# Patient Record
Sex: FEMALE | Race: WHITE | NOT HISPANIC OR LATINO | Employment: OTHER | URBAN - METROPOLITAN AREA
[De-identification: names, ages, dates, MRNs, and addresses within clinical notes are randomized per-mention and may not be internally consistent; named-entity substitution may affect disease eponyms.]

---

## 2017-05-03 ENCOUNTER — GENERIC CONVERSION - ENCOUNTER (OUTPATIENT)
Dept: OTHER | Facility: OTHER | Age: 62
End: 2017-05-03

## 2017-05-03 DIAGNOSIS — E03.9 HYPOTHYROIDISM: ICD-10-CM

## 2017-05-11 ENCOUNTER — TRANSCRIBE ORDERS (OUTPATIENT)
Dept: ADMINISTRATIVE | Facility: HOSPITAL | Age: 62
End: 2017-05-11

## 2017-05-11 ENCOUNTER — HOSPITAL ENCOUNTER (OUTPATIENT)
Dept: MAMMOGRAPHY | Facility: HOSPITAL | Age: 62
Discharge: HOME | End: 2017-05-11
Payer: COMMERCIAL

## 2017-05-11 DIAGNOSIS — Z13.9 SCREENING: ICD-10-CM

## 2017-05-11 DIAGNOSIS — Z12.31 VISIT FOR SCREENING MAMMOGRAM: Primary | ICD-10-CM

## 2017-05-11 PROCEDURE — G0202 SCR MAMMO BI INCL CAD: HCPCS

## 2017-05-17 ENCOUNTER — GENERIC CONVERSION - ENCOUNTER (OUTPATIENT)
Dept: OTHER | Facility: OTHER | Age: 62
End: 2017-05-17

## 2017-05-17 ENCOUNTER — LAB CONVERSION - ENCOUNTER (OUTPATIENT)
Dept: OTHER | Facility: OTHER | Age: 62
End: 2017-05-17

## 2017-05-17 LAB
A/G RATIO (HISTORICAL): 1.3 (CALC) (ref 1–2.5)
ALBUMIN SERPL BCP-MCNC: 4 G/DL (ref 3.6–5.1)
ALP SERPL-CCNC: 64 U/L (ref 33–130)
ALT SERPL W P-5'-P-CCNC: 59 U/L (ref 6–29)
AST SERPL W P-5'-P-CCNC: 36 U/L (ref 10–35)
BASOPHILS # BLD AUTO: 0.8 %
BASOPHILS # BLD AUTO: 34 CELLS/UL (ref 0–200)
BILIRUB SERPL-MCNC: 0.5 MG/DL (ref 0.2–1.2)
BUN SERPL-MCNC: 13 MG/DL (ref 7–25)
BUN/CREA RATIO (HISTORICAL): ABNORMAL (CALC) (ref 6–22)
CALCIUM SERPL-MCNC: 8.8 MG/DL (ref 8.6–10.4)
CHLORIDE SERPL-SCNC: 106 MMOL/L (ref 98–110)
CHOLEST SERPL-MCNC: 139 MG/DL (ref 125–200)
CHOLEST/HDLC SERPL: 4.8 (CALC)
CO2 SERPL-SCNC: 28 MMOL/L (ref 20–31)
CREAT SERPL-MCNC: 0.7 MG/DL (ref 0.5–0.99)
DEPRECATED RDW RBC AUTO: 13.5 % (ref 11–15)
EGFR AFRICAN AMERICAN (HISTORICAL): 107 ML/MIN/1.73M2
EGFR-AMERICAN CALC (HISTORICAL): 92 ML/MIN/1.73M2
EOSINOPHIL # BLD AUTO: 159 CELLS/UL (ref 15–500)
EOSINOPHIL # BLD AUTO: 3.7 %
GAMMA GLOBULIN (HISTORICAL): 3.2 G/DL (CALC) (ref 1.9–3.7)
GLUCOSE (HISTORICAL): 97 MG/DL (ref 65–99)
HCT VFR BLD AUTO: 40.3 % (ref 35–45)
HDLC SERPL-MCNC: 29 MG/DL
HGB BLD-MCNC: 13.2 G/DL (ref 11.7–15.5)
LDL CHOLESTEROL (HISTORICAL): 80 MG/DL (CALC)
LYMPHOCYTES # BLD AUTO: 1307 CELLS/UL (ref 850–3900)
LYMPHOCYTES # BLD AUTO: 30.4 %
MCH RBC QN AUTO: 29.6 PG (ref 27–33)
MCHC RBC AUTO-ENTMCNC: 32.7 G/DL (ref 32–36)
MCV RBC AUTO: 90.4 FL (ref 80–100)
MONOCYTES # BLD AUTO: 271 CELLS/UL (ref 200–950)
MONOCYTES (HISTORICAL): 6.3 %
NEUTROPHILS # BLD AUTO: 2528 CELLS/UL (ref 1500–7800)
NEUTROPHILS # BLD AUTO: 58.8 %
NON-HDL-CHOL (CHOL-HDL) (HISTORICAL): 110 MG/DL (CALC)
PLATELET # BLD AUTO: 256 THOUSAND/UL (ref 140–400)
PMV BLD AUTO: 8.1 FL (ref 7.5–12.5)
POTASSIUM SERPL-SCNC: 4 MMOL/L (ref 3.5–5.3)
RBC # BLD AUTO: 4.46 MILLION/UL (ref 3.8–5.1)
SODIUM SERPL-SCNC: 140 MMOL/L (ref 135–146)
TOTAL PROTEIN (HISTORICAL): 7.2 G/DL (ref 6.1–8.1)
TRIGL SERPL-MCNC: 148 MG/DL
TSH SERPL DL<=0.05 MIU/L-ACNC: 2.66 MIU/L (ref 0.4–4.5)
WBC # BLD AUTO: 4.3 THOUSAND/UL (ref 3.8–10.8)

## 2017-05-24 ENCOUNTER — ALLSCRIPTS OFFICE VISIT (OUTPATIENT)
Dept: OTHER | Facility: OTHER | Age: 62
End: 2017-05-24

## 2017-07-20 ENCOUNTER — GENERIC CONVERSION - ENCOUNTER (OUTPATIENT)
Dept: OTHER | Facility: OTHER | Age: 62
End: 2017-07-20

## 2017-09-13 ENCOUNTER — TRANSCRIBE ORDERS (OUTPATIENT)
Dept: SLEEP CENTER | Facility: CLINIC | Age: 62
End: 2017-09-13

## 2017-09-13 DIAGNOSIS — Z99.89 OSA ON CPAP: Primary | ICD-10-CM

## 2017-09-13 DIAGNOSIS — G47.33 OSA ON CPAP: Primary | ICD-10-CM

## 2017-09-20 ENCOUNTER — HOSPITAL ENCOUNTER (OUTPATIENT)
Dept: SLEEP CENTER | Facility: CLINIC | Age: 62
Discharge: HOME | End: 2017-09-20
Payer: COMMERCIAL

## 2017-09-20 ENCOUNTER — TRANSCRIBE ORDERS (OUTPATIENT)
Dept: SLEEP CENTER | Facility: CLINIC | Age: 62
End: 2017-09-20

## 2017-09-20 DIAGNOSIS — G47.33 OSA ON CPAP: ICD-10-CM

## 2017-09-20 DIAGNOSIS — Z99.89 OSA ON CPAP: ICD-10-CM

## 2017-09-20 DIAGNOSIS — G47.33 OSA (OBSTRUCTIVE SLEEP APNEA): Primary | ICD-10-CM

## 2017-10-19 ENCOUNTER — GENERIC CONVERSION - ENCOUNTER (OUTPATIENT)
Dept: OTHER | Facility: OTHER | Age: 62
End: 2017-10-19

## 2017-10-31 DIAGNOSIS — Z00.00 ENCOUNTER FOR GENERAL ADULT MEDICAL EXAMINATION WITHOUT ABNORMAL FINDINGS: ICD-10-CM

## 2017-10-31 DIAGNOSIS — E03.9 HYPOTHYROIDISM: ICD-10-CM

## 2017-10-31 DIAGNOSIS — M54.9 DORSALGIA: ICD-10-CM

## 2017-10-31 DIAGNOSIS — R00.2 PALPITATIONS: ICD-10-CM

## 2017-11-03 ENCOUNTER — GENERIC CONVERSION - ENCOUNTER (OUTPATIENT)
Dept: OTHER | Facility: OTHER | Age: 62
End: 2017-11-03

## 2017-11-03 ENCOUNTER — LAB CONVERSION - ENCOUNTER (OUTPATIENT)
Dept: OTHER | Facility: OTHER | Age: 62
End: 2017-11-03

## 2017-11-03 LAB
A/G RATIO (HISTORICAL): 1.4 (CALC) (ref 1–2.5)
ALBUMIN SERPL BCP-MCNC: 4.4 G/DL (ref 3.6–5.1)
ALP SERPL-CCNC: 69 U/L (ref 33–130)
ALT SERPL W P-5'-P-CCNC: 45 U/L (ref 6–29)
AST SERPL W P-5'-P-CCNC: 37 U/L (ref 10–35)
BASOPHILS # BLD AUTO: 0.8 %
BASOPHILS # BLD AUTO: 40 CELLS/UL (ref 0–200)
BILIRUB SERPL-MCNC: 0.6 MG/DL (ref 0.2–1.2)
BUN SERPL-MCNC: 14 MG/DL (ref 7–25)
BUN/CREA RATIO (HISTORICAL): ABNORMAL (CALC) (ref 6–22)
CALCIUM SERPL-MCNC: 9.5 MG/DL (ref 8.6–10.4)
CHLORIDE SERPL-SCNC: 104 MMOL/L (ref 98–110)
CHOLEST SERPL-MCNC: 189 MG/DL
CHOLEST/HDLC SERPL: 3.9 (CALC)
CO2 SERPL-SCNC: 27 MMOL/L (ref 20–31)
CREAT SERPL-MCNC: 0.86 MG/DL (ref 0.5–0.99)
DEPRECATED RDW RBC AUTO: 13.1 % (ref 11–15)
EGFR AFRICAN AMERICAN (HISTORICAL): 83 ML/MIN/1.73M2
EGFR-AMERICAN CALC (HISTORICAL): 72 ML/MIN/1.73M2
EOSINOPHIL # BLD AUTO: 160 CELLS/UL (ref 15–500)
EOSINOPHIL # BLD AUTO: 3.2 %
GAMMA GLOBULIN (HISTORICAL): 3.2 G/DL (CALC) (ref 1.9–3.7)
GLUCOSE (HISTORICAL): 97 MG/DL (ref 65–99)
HCT VFR BLD AUTO: 42.7 % (ref 35–45)
HDLC SERPL-MCNC: 49 MG/DL
HGB BLD-MCNC: 14.1 G/DL (ref 11.7–15.5)
LDL CHOLESTEROL (HISTORICAL): 117 MG/DL (CALC)
LYMPHOCYTES # BLD AUTO: 1330 CELLS/UL (ref 850–3900)
LYMPHOCYTES # BLD AUTO: 26.6 %
MCH RBC QN AUTO: 30.1 PG (ref 27–33)
MCHC RBC AUTO-ENTMCNC: 33 G/DL (ref 32–36)
MCV RBC AUTO: 91.2 FL (ref 80–100)
MONOCYTES # BLD AUTO: 355 CELLS/UL (ref 200–950)
MONOCYTES (HISTORICAL): 7.1 %
NEUTROPHILS # BLD AUTO: 3115 CELLS/UL (ref 1500–7800)
NEUTROPHILS # BLD AUTO: 62.3 %
NON-HDL-CHOL (CHOL-HDL) (HISTORICAL): 140 MG/DL (CALC)
PLATELET # BLD AUTO: 283 THOUSAND/UL (ref 140–400)
PMV BLD AUTO: 10 FL (ref 7.5–12.5)
POTASSIUM SERPL-SCNC: 4.4 MMOL/L (ref 3.5–5.3)
RBC # BLD AUTO: 4.68 MILLION/UL (ref 3.8–5.1)
SODIUM SERPL-SCNC: 140 MMOL/L (ref 135–146)
TOTAL PROTEIN (HISTORICAL): 7.6 G/DL (ref 6.1–8.1)
TRIGL SERPL-MCNC: 124 MG/DL
TSH SERPL DL<=0.05 MIU/L-ACNC: 2.03 MIU/L (ref 0.4–4.5)
WBC # BLD AUTO: 5 THOUSAND/UL (ref 3.8–10.8)

## 2017-11-15 ENCOUNTER — ALLSCRIPTS OFFICE VISIT (OUTPATIENT)
Dept: OTHER | Facility: OTHER | Age: 62
End: 2017-11-15

## 2017-11-16 NOTE — PROGRESS NOTES
Assessment    1  Back pain (724 5) (M54 9)   2  Apnea, sleep (780 57) (G47 30)   3  Hypothyroidism (244 9) (E03 9)   4  Hyperlipidemia (272 4) (E78 5)    Plan  Back pain    · * US ABDOMEN COMPLETE; Status:Hold For - Scheduling; Requested for:80Dio8967;    · * XR CHEST PA & LATERAL; Status:Active; Requested for:63Sfh9384;   Esophagitis, reflux    · Pantoprazole Sodium 40 MG Oral Tablet Delayed Release; Take 1 tablet daily  Hypothyroidism    · Levothyroxine Sodium 25 MCG Oral Tablet; TAKE 1 TABLET MONDAY THROUGHFRIDAY AND TAKE 2 TABLETS ON SATURDAY AND SUNDAY    Discussion/Summary    Hyperlipidemia  Patient will try to adjust her diet to try and improve her LDL value  TSH is stable on current dose of levothyroxine  pain  Patient has concerns regarding family history of aortic and abdominal aneurysms  She will check abdominal ultrasound and chest x-ray for further evaluation  We will make further recommendations pending results of test  Since symptoms only occur in the early mornings while sleeping I suspect some if not all of her symptoms may be related to stressful condition at work  total time of encounter was 25 minutes-- and-- 20 minutes was spent counseling  Chief Complaint  Pt is here for a 6 month f/u appt  Pt c/o upper back pain x 6 months  Pt denies injury  Pt describes a pressure feeling when she sleeps  History of Present Illness  I reviewed chief complaint with the patient  She reports having several months history of awakening in the early morning hours with palpitations and shortness of breath  Her allergist and sleep doctor do not believe sleep apnea or CPAP machine is the cause of symptoms  Patient has been under a lot of stress in the past 6 months 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  There is a question of whether patient would retire or work for the new dental company   Patient does admit to lack of exercise and increase in chocolate consumption  I reviewed her most recent      Review of Systems   Constitutional: No fever, no chills, feels well, no tiredness, no recent weight gain or weight loss  ENT: no complaints of earache, no loss of hearing, no nose bleeds, no nasal discharge, no sore throat, no hoarseness  Cardiovascular: as noted in HPI  Respiratory: as noted in HPI  Gastrointestinal: No complaints of abdominal pain, no constipation, no nausea or vomiting, no diarrhea, no bloody stools  Genitourinary: No complaints of dysuria, no incontinence, no pelvic pain, no dysmenorrhea, no vaginal discharge or bleeding  Active Problems    1  Apnea, sleep (780 57) (G47 30)   2  Calcific tendinitis of right shoulder (726 11) (M75 31)   3  Esophagitis, reflux (530 11) (K21 0)   4  Hyperlipidemia (272 4) (E78 5)   5  Hypothyroidism (244 9) (E03 9)   6  Obesity (278)   7  Preoperative evaluation of a medical condition to rule out surgical contraindications (TAR required) (V72 83) (Z01 818)   8  Sleep apnea (780 57) (G47 30)   9  Stress Incontinence (788 39)   10  Urine findings abnormal (791 9) (R82 90)   11  UTI symptoms (788 99) (R39 9)   12  Vitamin D deficiency (268 9) (E55 9)    Past Medical History  1  History of Asthma (493 90) (J45 909)   2  History of glaucoma (V12 49) (Z80 78)    Family History  Mother    1  Family history of Reported Family History Of Heart Disease  Maternal Grandmother    2  Family history of Thyroid Disorder (V18 19)    Social History     · Never A Smoker  The social history was reviewed and updated today  Current Meds   1  Calcium 600 MG Oral Tablet; TAKE 1 TABLET DAILY; Therapy: 74DMO5638 to (Evaluate:52Yau7734) Recorded   2  Claritin TABS; TAKE 1 TABLET DAILY; Therapy: (Recorded:23Nov2016) to Recorded   3  [employer] Vitamin D 2000 UNIT CAPS; take 1 capsule daily; Therapy: (Recorded:23Nov2016) to Recorded   4   EpiPen 2-Scott 0 3 MG/0 3ML Injection Solution Auto-injector; INJECT 0 3ML INTRAMUSCULARLY AS DIRECTED; Therapy: 17IWL6733 to Recorded   5  EQL Omega 3 Fish Oil CAPS; take 1 capsule daily; Therapy: (Recorded:23Nov2016) to Recorded   6  Estrace CREA; USE AS DIRECTED; Therapy: (Recorded:23Nov2016) to Recorded   7  Flonase 50 MCG/ACT SUSP; TWO (2) SPRAY(S) INTO EACH NOSTRIL DAILY; Therapy: (Recorded:23Nov2016) to Recorded   8  Levothyroxine Sodium 25 MCG Oral Tablet; TAKE 1 TABLET MONDAY THROUGH FRIDAY AND TAKE 2 TABLETS ON SATURDAY AND SUNDAY; Therapy: 29WNQ9575 to (Last Rx:23Nov2016)  Requested for: 23Nov2016 Ordered   9  Multi Vitamin Daily Oral Tablet; TAKE 1 TABLET DAILY; Therapy: 46ZDO8564 to Recorded   10  Pantoprazole Sodium 40 MG Oral Tablet Delayed Release; Take 1 tablet daily; Therapy: 26XKQ5586 to (Last Rx:25Nov2016)  Requested for: 82WRK2422 Ordered   11  Simvastatin 10 MG Oral Tablet; Take 1 tablet daily; Therapy: 23PPG7151 to (Payal Jack)  Requested for: 55WZV2800; Last  Rx:05Jun2017 Ordered   12  Symbicort 160-4 5 MCG/ACT Inhalation Aerosol; USE AS DIRECTED; Therapy: (Recorded:23Nov2016) to Recorded   13  Xopenex HFA AERO; inhale 2 puffs every 4-6 hours as needed; Therapy: (Recorded:23Nov2016) to Recorded    The medication list was reviewed and updated today  Allergies  1  Codeine Phosphate SOLN   2  Percocet TABS   3  Vicodin TABS   4  Sulfa Drugs    Vitals  Vital Signs    Recorded: 61ADN9427 11:57AM Recorded: 58BMK3368 11:33AM   Temperature  97 6 F   Heart Rate  80   Systolic 452 258   Diastolic 80 92   Height  5 ft 2 in   Weight  193 lb 4 oz   BMI Calculated  35 35   BSA Calculated  1 88       Physical Exam   Constitutional  General appearance: No acute distress, well appearing and well nourished  Ears, Nose, Mouth, and Throat  External inspection of ears and nose: Normal    Otoscopic examination: Tympanic membranes translucent with normal light reflex  Canals patent without erythema  Oropharynx: Normal with no erythema, edema, exudate or lesions     Pulmonary  Respiratory effort: No increased work of breathing or signs of respiratory distress  Auscultation of lungs: Clear to auscultation  Cardiovascular  Auscultation of heart: Normal rate and rhythm, normal S1 and S2, without murmurs  Examination of extremities for edema and/or varicosities: Normal    Abdomen  Abdomen: Non-tender, no masses  Liver and spleen: No hepatomegaly or splenomegaly  Future Appointments    Date/Time Provider Specialty Site   49/64/8073 83:66 AM LINDSAY Jett   Family Medicine [address] [place] [address]       Signatures   Electronically signed by : LINDSAY Joe ; Nov 15 7888 12:34PM EST                       (Author)

## 2017-11-17 ENCOUNTER — HOSPITAL ENCOUNTER (OUTPATIENT)
Dept: SLEEP CENTER | Facility: CLINIC | Age: 62
Discharge: HOME | End: 2017-11-17
Payer: COMMERCIAL

## 2017-11-17 ENCOUNTER — TRANSCRIBE ORDERS (OUTPATIENT)
Dept: SLEEP CENTER | Facility: CLINIC | Age: 62
End: 2017-11-17

## 2017-11-17 DIAGNOSIS — G47.33 OSA (OBSTRUCTIVE SLEEP APNEA): Primary | ICD-10-CM

## 2017-11-17 DIAGNOSIS — G47.33 OSA (OBSTRUCTIVE SLEEP APNEA): ICD-10-CM

## 2017-11-27 ENCOUNTER — HOSPITAL ENCOUNTER (OUTPATIENT)
Dept: ULTRASOUND IMAGING | Facility: HOSPITAL | Age: 62
Discharge: HOME | End: 2017-11-27
Payer: COMMERCIAL

## 2017-11-27 ENCOUNTER — HOSPITAL ENCOUNTER (OUTPATIENT)
Dept: RADIOLOGY | Facility: HOSPITAL | Age: 62
Discharge: HOME | End: 2017-11-27
Payer: COMMERCIAL

## 2017-11-27 ENCOUNTER — TRANSCRIBE ORDERS (OUTPATIENT)
Dept: ADMINISTRATIVE | Facility: HOSPITAL | Age: 62
End: 2017-11-27

## 2017-11-27 DIAGNOSIS — M54.9 DORSALGIA: ICD-10-CM

## 2017-11-27 PROCEDURE — 76775 US EXAM ABDO BACK WALL LIM: CPT

## 2017-11-27 PROCEDURE — 71020 HB CHEST X-RAY 2VW FRONTAL&LATL: CPT

## 2017-11-28 ENCOUNTER — GENERIC CONVERSION - ENCOUNTER (OUTPATIENT)
Dept: OTHER | Facility: OTHER | Age: 62
End: 2017-11-28

## 2017-12-11 ENCOUNTER — GENERIC CONVERSION - ENCOUNTER (OUTPATIENT)
Dept: OTHER | Facility: OTHER | Age: 62
End: 2017-12-11

## 2017-12-11 ENCOUNTER — HOSPITAL ENCOUNTER (OUTPATIENT)
Dept: NON INVASIVE DIAGNOSTICS | Facility: CLINIC | Age: 62
Discharge: HOME | End: 2017-12-11
Payer: COMMERCIAL

## 2017-12-11 DIAGNOSIS — R00.2 PALPITATIONS: ICD-10-CM

## 2017-12-11 PROCEDURE — 93226 XTRNL ECG REC<48 HR SCAN A/R: CPT

## 2017-12-11 PROCEDURE — 93225 XTRNL ECG REC<48 HRS REC: CPT

## 2017-12-15 ENCOUNTER — GENERIC CONVERSION - ENCOUNTER (OUTPATIENT)
Dept: OTHER | Facility: OTHER | Age: 62
End: 2017-12-15

## 2018-01-12 VITALS
TEMPERATURE: 97.6 F | BODY MASS INDEX: 35.56 KG/M2 | WEIGHT: 193.25 LBS | DIASTOLIC BLOOD PRESSURE: 80 MMHG | SYSTOLIC BLOOD PRESSURE: 138 MMHG | HEART RATE: 80 BPM | HEIGHT: 62 IN

## 2018-01-13 VITALS
HEART RATE: 84 BPM | WEIGHT: 189.25 LBS | DIASTOLIC BLOOD PRESSURE: 86 MMHG | RESPIRATION RATE: 18 BRPM | SYSTOLIC BLOOD PRESSURE: 130 MMHG | BODY MASS INDEX: 34.82 KG/M2 | HEIGHT: 62 IN | TEMPERATURE: 98.6 F

## 2018-01-14 NOTE — PROGRESS NOTES
Chief Complaint  Patient is here for a urine dip for preoperative testing  Active Problems    1  Apnea, sleep (780 57) (G47 30)   2  Calcific tendinitis of right shoulder (726 11) (M75 31)   3  Esophagitis, reflux (530 11) (K21 0)   4  Hyperlipidemia (272 4) (E78 5)   5  Hypothyroidism (244 9) (E03 9)   6  Obesity (278)   7  Preoperative evaluation of a medical condition to rule out surgical contraindications (TAR   required) (V72 83) (Z01 818)   8  Sleep apnea (780 57) (G47 30)   9  Stress Incontinence (788 39)   10  Urine findings abnormal (791 9) (R82 90)   11  UTI symptoms (788 99) (R39 9)   12  Vitamin D deficiency (268 9) (E55 9)    Current Meds   1  Calcium 600 MG Oral Tablet; TAKE 1 TABLET DAILY; Therapy: 05GQL5441 to (Evaluate:10Uqs5367) Recorded   2  Claritin TABS; TAKE 1 TABLET DAILY; Therapy: (Recorded:23Nov2016) to Recorded   3  [employer] Vitamin D 2000 UNIT CAPS; take 1 capsule daily; Therapy: (Recorded:23Nov2016) to Recorded   4  EpiPen 2-Scott 0 3 MG/0 3ML Injection Solution Auto-injector; INJECT 0 3ML   INTRAMUSCULARLY AS DIRECTED; Therapy: 45LKQ4589 to Recorded   5  EQL Omega 3 Fish Oil CAPS; take 1 capsule daily; Therapy: (Recorded:23Nov2016) to Recorded   6  Estrace CREA; USE AS DIRECTED; Therapy: (Recorded:23Nov2016) to Recorded   7  Flonase 50 MCG/ACT SUSP; TWO (2) SPRAY(S) INTO EACH NOSTRIL DAILY; Therapy: (Recorded:23Nov2016) to Recorded   8  Levothyroxine Sodium 25 MCG Oral Tablet; TAKE 1 TABLET MONDAY THROUGH   FRIDAY AND TAKE 2 TABLETS ON SATURDAY AND SUNDAY; Therapy: 94QRL7790 to (Last Rx:23Nov2016)  Requested for: 23Nov2016 Ordered   9  Multi Vitamin Daily Oral Tablet; TAKE 1 TABLET DAILY; Therapy: 75SSF4280 to Recorded   10  Pantoprazole Sodium 40 MG Oral Tablet Delayed Release; Take 1 tablet daily; Therapy: 25PFD7890 to (Last Rx:25Nov2016)  Requested for: 72HWL3757 Ordered   11  Simvastatin 10 MG Oral Tablet; Take 1 tablet daily;     Therapy: 35ZQN0700 to (Evaluate:10Jun2017)  Requested for: 18FBW3114; Last    Rx:15Jun2016 Ordered   12  Symbicort 160-4 5 MCG/ACT Inhalation Aerosol; USE AS DIRECTED; Therapy: (Recorded:23Nov2016) to Recorded   13  Xopenex HFA AERO; inhale 2 puffs every 4-6 hours as needed; Therapy: (Recorded:23Nov2016) to Recorded    Allergies    1  Codeine Phosphate SOLN   2  Percocet TABS   3  Vicodin TABS   4  Sulfa Drugs    Results/Data  Urine Dip Non-Automated- POC 46CHI4522 63:73FU Dali Ziegler     Test Name Result Flag Reference   Color Yellow     Clarity Transparent     Leukocytes negative     Nitrite negative     Blood negative     Bilirubin negative     Urobilinogen 0 2     Protein negative     Ph 5 0     Specific Gravity 1 030     Ketone negative     Glucose negative     Color Yellow     Clarity Transparent     Leukocytes negative     Nitrite negative     Blood negative     Bilirubin negative     Urobilinogen 0 2     Protein negative     Ph 5 0     Specific Gravity 1 030     Ketone negative     Glucose negative               Plan  Health Maintenance    · Urine Dip Non-Automated- POC; Status:Resulted - Requires Verification,Retrospective  By Protocol Authorization;   Done: 41DBZ4562 01:31PM    Future Appointments    Date/Time Provider Specialty Site   07/37/2918 60:40 AM LINDSAY Aguayo   Family Medicine [address] [place] [address]     Signatures   Electronically signed by : LINDSAY Lomeli ; Dec 13 0221  8:14PM EST                       (Author)

## 2018-01-16 NOTE — RESULT NOTES
Discussion/Summary   Ultrasound shows no evidence of aortic aneurysm     Verified Results  [address] 75LBF8268 98:29LZ Yareli Swartz Order Number: [id number]    - Patient Instructions: To schedule this appointment, please contact Central Scheduling at [phone]  Test Name Result Flag Reference   US ABDOMINAL AORTA (Report)     ABDOMINAL AORTIC ULTRASOUND     INDICATION: Evaluate for aortic aneurysm  COMPARISON: December 2, 2010     FINDINGS:      Ultrasound of the abdominal aorta was performed in longitudinal and transverse planes with a curvilinear transducer  Proximal aorta: 2 4 x 2 4 cm   Mid aorta:  1 9 x 1 9 cm   Distal aorta:  1 5 x 1 4 cm   Right common iliac origin: 1 1 x 1 1 cm   Left common iliac origin: 1 1 x 1 1 cm     No periaortic collections or adenopathy detected  IMPRESSION:     No evidence for abdominal aortic aneurysm         Workstation performed: [id number]     Signed by:   Angelica Meléndez MD   11/27/17

## 2018-01-16 NOTE — RESULT NOTES
Verified Results  (1) CBC/PLT/DIFF 68WGI2035 74:90BN Onesimo El     Test Name Result Flag Reference   WHITE BLOOD CELL COUNT 4 3 Thousand/uL  3 8-10 8   RED BLOOD CELL COUNT 4 46 Million/uL  3 80-5 10   HEMOGLOBIN 13 2 g/dL  11 7-15 5   HEMATOCRIT 40 3 %  35 0-45 0   MCV 90 4 fL  80 0-100 0   MCH 29 6 pg  27 0-33 0   MCHC 32 7 g/dL  32 0-36 0   RDW 13 5 %  11 0-15 0   PLATELET COUNT 893 Thousand/uL  140-400   ABSOLUTE NEUTROPHILS 2528 cells/uL  2310-9639   ABSOLUTE LYMPHOCYTES 1307 cells/uL  850-3900   ABSOLUTE MONOCYTES 271 cells/uL  200-950   ABSOLUTE EOSINOPHILS 159 cells/uL     ABSOLUTE BASOPHILS 34 cells/uL  0-200   NEUTROPHILS 58 8 %     LYMPHOCYTES 30 4 %     MONOCYTES 6 3 %     EOSINOPHILS 3 7 %     BASOPHILS 0 8 %     MPV 8 1 fL  7 5-12 5     (1) COMPREHENSIVE METABOLIC PANEL 46ZHO4806 04:95QT Mayra Castillo     Test Name Result Flag Reference   GLUCOSE 97 mg/dL  65-99   Fasting reference interval   UREA NITROGEN (BUN) 13 mg/dL  7-25   CREATININE 0 70 mg/dL  0 50-0 99   For patients >52years of age, the reference limit  for Creatinine is approximately 13% higher for people  identified as -American  eGFR NON-AFR   AMERICAN 92 mL/min/1 73m2  > OR = 60   eGFR AFRICAN AMERICAN 107 mL/min/1 73m2  > OR = 60   BUN/CREATININE RATIO   5-46   NOT APPLICABLE (calc)   SODIUM 140 mmol/L  135-146   POTASSIUM 4 0 mmol/L  3 5-5 3   CHLORIDE 106 mmol/L     CARBON DIOXIDE 28 mmol/L  20-31   CALCIUM 8 8 mg/dL  8 6-10 4   PROTEIN, TOTAL 7 2 g/dL  6 1-8 1   ALBUMIN 4 0 g/dL  3 6-5 1   GLOBULIN 3 2 g/dL (calc)  1 9-3 7   ALBUMIN/GLOBULIN RATIO 1 3 (calc)  1 0-2 5   BILIRUBIN, TOTAL 0 5 mg/dL  0 2-1 2   ALKALINE PHOSPHATASE 64 U/L     AST 36 U/L H 10-35   ALT 59 U/L H 6-29     (1) LIPID PANEL, FASTING 78WGF4310 02:80TG Aleksandr [place], Onesimo     Test Name Result Flag Reference   CHOLESTEROL, TOTAL 139 mg/dL  125-200   HDL CHOLESTEROL 29 mg/dL L > OR = 46   TRIGLICERIDES 145 mg/dL  <150   LDL-CHOLESTEROL 80 mg/dL (calc)  <130   Desirable range <100 mg/dL for patients with CHD or  diabetes and <70 mg/dL for diabetic patients with  known heart disease  CHOL/HDLC RATIO 4 8 (calc)  < OR = 5 0   NON HDL CHOLESTEROL 110 mg/dL (calc)     Target for non-HDL cholesterol is 30 mg/dL higher than   LDL cholesterol target       (Q) TSH, 3RD GENERATION [id number] 52:22KK Onesimo Kan   REPORT COMMENT:  FASTING:YES     Test Name Result Flag Reference   TSH 2 66 mIU/L  0 40-4 50

## 2018-01-18 NOTE — RESULT NOTES
Verified Results  (1) LIPID PANEL, FASTING [id number] 82:20CC Emma Males, Onesimo     Test Name Result Flag Reference   CHOLESTEROL, TOTAL 189 mg/dL  <200   HDL CHOLESTEROL 49 mg/dL L >84   TRIGLICERIDES 567 mg/dL  <150   LDL-CHOLESTEROL 117 mg/dL (calc) H    Reference range: <100     Desirable range <100 mg/dL for patients with CHD or  diabetes and <70 mg/dL for diabetic patients with  known heart disease  LDL-C is now calculated using the Russ-Turcios   calculation, which is a validated novel method providing   better accuracy than the Friedewald equation in the   estimation of LDL-C  Jackie Avalos  [place] [id number];171(84): 4678-9753   (http://FoundValue/faq/YHS310)   CHOL/HDLC RATIO 3 9 (calc)  <5 0   NON HDL CHOLESTEROL 140 mg/dL (calc) H <130   For patients with diabetes plus 1 major ASCVD risk   factor, treating to a non-HDL-C goal of <100 mg/dL   (LDL-C of <70 mg/dL) is considered a therapeutic   option  (1) COMPREHENSIVE METABOLIC PANEL [id number] 04:66QG Cathy Lujan     Test Name Result Flag Reference   GLUCOSE 97 mg/dL  65-99   Fasting reference interval   UREA NITROGEN (BUN) 14 mg/dL  7-25   CREATININE 0 86 mg/dL  0 50-0 99   For patients >52years of age, the reference limit  for Creatinine is approximately 13% higher for people  identified as -American  eGFR NON-AFR   AMERICAN 72 mL/min/1 73m2  > OR = 60   eGFR AFRICAN AMERICAN 83 mL/min/1 73m2  > OR = 60   BUN/CREATININE RATIO   6-77   NOT APPLICABLE (calc)   SODIUM 140 mmol/L  135-146   POTASSIUM 4 4 mmol/L  3 5-5 3   CHLORIDE 104 mmol/L     CARBON DIOXIDE 27 mmol/L  20-31   CALCIUM 9 5 mg/dL  8 6-10 4   PROTEIN, TOTAL 7 6 g/dL  6 1-8 1   ALBUMIN 4 4 g/dL  3 6-5 1   GLOBULIN 3 2 g/dL (calc)  1 9-3 7   ALBUMIN/GLOBULIN RATIO 1 4 (calc)  1 0-2 5   BILIRUBIN, TOTAL 0 6 mg/dL  0 2-1 2   ALKALINE PHOSPHATASE 69 U/L     AST 37 U/L H 10-35   ALT 45 U/L H 6-29     (1) CBC/PLT/DIFF [id number] 82:25IO Arcadio Kan Test Name Result Flag Reference   WHITE BLOOD CELL COUNT 5 0 Thousand/uL  3 8-10 8   RED BLOOD CELL COUNT 4 68 Million/uL  3 80-5 10   HEMOGLOBIN 14 1 g/dL  11 7-15 5   HEMATOCRIT 42 7 %  35 0-45 0   MCV 91 2 fL  80 0-100 0   MCH 30 1 pg  27 0-33 0   MCHC 33 0 g/dL  32 0-36 0   RDW 13 1 %  11 0-15 0   PLATELET COUNT 999 Thousand/uL  140-400   ABSOLUTE NEUTROPHILS 3115 cells/uL  9786-2571   ABSOLUTE LYMPHOCYTES 1330 cells/uL  850-3900   ABSOLUTE MONOCYTES 355 cells/uL  200-950   ABSOLUTE EOSINOPHILS 160 cells/uL     ABSOLUTE BASOPHILS 40 cells/uL  0-200   NEUTROPHILS 62 3 %     LYMPHOCYTES 26 6 %     MONOCYTES 7 1 %     EOSINOPHILS 3 2 %     BASOPHILS 0 8 %     MPV 10 0 fL  7 5-12 5     (Q) TSH, 3RD GENERATION [id number] 32:67MN Onesimo Kan   REPORT COMMENT:  FASTING:YES     Test Name Result Flag Reference   TSH 2 03 mIU/L  0 40-4 50

## 2018-01-23 NOTE — RESULT NOTES
Discussion/Summary   Holter monitor read as normal     Verified Results  HOLTER MONITOR - 48 HOUR [id number] 18:77TP Lasha Stockton Order Number: [id number]    - Patient Instructions: To schedule this appointment, please contact Central Scheduling at [phone]  Test Name Result Flag Reference   HOLTER MONITOR - 48 HOUR (Report)     48 HOUR HOLTER MONITOR     INDICATION: Palpitations     Average heart rate: 78 bpm    Lowest heart rate: 48 bpm   Highest heart rate: 129 bpm     VENTRICULAR ECTOPY - 0 0% of all monitored beats   Total number: 1      SUPRAVENTRICULAR ECTOPY - 0 0% of all monitored beats   Total number: 13      BRADYCARDIA   Slowest episode: 2:20 am on D2, (minimum heart rate of 48 bpm)  This corresponded with sinus bradycardia with no evidence of heart block  TACHYCARDIA   Fastest episode: occurred at 2:11 pm on D1, (maximum heart rate of 129 bpm)  This corresponded with sinus tachycardia  SYMPTOMS   The patient experienced no significant symptoms during the monitoring time period  1) Normal Holter monitor of 48 hrs duration  2) Normal burden of ventricular and supraventricular ectopic beats  Interpreting Physician: Leyla Wilson MD, [facility]

## 2018-03-15 DIAGNOSIS — E78.5 HYPERLIPIDEMIA, UNSPECIFIED HYPERLIPIDEMIA TYPE: Primary | ICD-10-CM

## 2018-03-15 RX ORDER — SIMVASTATIN 10 MG
1 TABLET ORAL DAILY
COMMUNITY
Start: 2011-02-09 | End: 2018-03-15 | Stop reason: SDUPTHER

## 2018-03-16 RX ORDER — SIMVASTATIN 10 MG
10 TABLET ORAL DAILY
Qty: 90 TABLET | Refills: 3 | Status: SHIPPED | OUTPATIENT
Start: 2018-03-16 | End: 2018-04-19 | Stop reason: SDUPTHER

## 2018-04-19 DIAGNOSIS — E78.5 HYPERLIPIDEMIA, UNSPECIFIED HYPERLIPIDEMIA TYPE: ICD-10-CM

## 2018-04-19 RX ORDER — SIMVASTATIN 10 MG
10 TABLET ORAL DAILY
Qty: 90 TABLET | Refills: 1 | Status: SHIPPED | OUTPATIENT
Start: 2018-04-19 | End: 2018-11-26 | Stop reason: SDUPTHER

## 2018-05-15 RX ORDER — EPINEPHRINE 0.3 MG/.3ML
0.3 INJECTION SUBCUTANEOUS
COMMUNITY
Start: 2016-11-23

## 2018-05-15 RX ORDER — LEVALBUTEROL TARTRATE 45 UG/1
2 AEROSOL, METERED ORAL
COMMUNITY
End: 2018-11-26

## 2018-05-15 RX ORDER — NAPROXEN SODIUM 220 MG/1
1 TABLET ORAL DAILY
COMMUNITY

## 2018-05-15 RX ORDER — LORATADINE 10 MG/1
1 TABLET ORAL DAILY
COMMUNITY

## 2018-05-15 RX ORDER — IBUPROFEN 200 MG
1 CAPSULE ORAL DAILY
COMMUNITY
Start: 2016-11-23 | End: 2018-05-16

## 2018-05-15 RX ORDER — PANTOPRAZOLE SODIUM 40 MG/1
1 TABLET, DELAYED RELEASE ORAL DAILY
COMMUNITY
Start: 2014-11-05 | End: 2018-11-04 | Stop reason: SDUPTHER

## 2018-05-15 RX ORDER — BUDESONIDE AND FORMOTEROL FUMARATE DIHYDRATE 160; 4.5 UG/1; UG/1
AEROSOL RESPIRATORY (INHALATION) DAILY
COMMUNITY

## 2018-05-15 RX ORDER — FLUTICASONE PROPIONATE 50 MCG
2 SPRAY, SUSPENSION (ML) NASAL DAILY
COMMUNITY

## 2018-05-15 RX ORDER — LEVOTHYROXINE SODIUM 0.03 MG/1
TABLET ORAL
COMMUNITY
Start: 2013-04-23 | End: 2018-11-04 | Stop reason: SDUPTHER

## 2018-05-15 RX ORDER — ESTRADIOL 0.1 MG/G
CREAM VAGINAL
COMMUNITY
End: 2018-05-16

## 2018-05-16 ENCOUNTER — OFFICE VISIT (OUTPATIENT)
Dept: FAMILY MEDICINE CLINIC | Facility: CLINIC | Age: 63
End: 2018-05-16
Payer: COMMERCIAL

## 2018-05-16 ENCOUNTER — HOSPITAL ENCOUNTER (OUTPATIENT)
Dept: MAMMOGRAPHY | Facility: HOSPITAL | Age: 63
Discharge: HOME | End: 2018-05-16
Payer: COMMERCIAL

## 2018-05-16 VITALS
HEART RATE: 72 BPM | HEIGHT: 62 IN | SYSTOLIC BLOOD PRESSURE: 124 MMHG | RESPIRATION RATE: 12 BRPM | WEIGHT: 195.6 LBS | TEMPERATURE: 98.1 F | DIASTOLIC BLOOD PRESSURE: 82 MMHG | BODY MASS INDEX: 35.99 KG/M2

## 2018-05-16 DIAGNOSIS — Z12.31 VISIT FOR SCREENING MAMMOGRAM: ICD-10-CM

## 2018-05-16 DIAGNOSIS — E78.5 HYPERLIPIDEMIA, UNSPECIFIED HYPERLIPIDEMIA TYPE: ICD-10-CM

## 2018-05-16 DIAGNOSIS — E03.9 HYPOTHYROIDISM, UNSPECIFIED TYPE: ICD-10-CM

## 2018-05-16 DIAGNOSIS — E11.9 TYPE 2 DIABETES MELLITUS WITHOUT COMPLICATION, WITHOUT LONG-TERM CURRENT USE OF INSULIN (HCC): Primary | ICD-10-CM

## 2018-05-16 PROCEDURE — 99214 OFFICE O/P EST MOD 30 MIN: CPT | Performed by: FAMILY MEDICINE

## 2018-05-16 PROCEDURE — 77067 SCR MAMMO BI INCL CAD: CPT

## 2018-05-16 PROCEDURE — 3008F BODY MASS INDEX DOCD: CPT | Performed by: FAMILY MEDICINE

## 2018-05-16 NOTE — PROGRESS NOTES
FAMILY PRACTICE OFFICE VISIT       NAME: Cynthia Reyes  AGE: 58 y o  SEX: female       : 1955        MRN: [id number]    DATE: 2018  TIME: 6:51 PM    Assessment and Plan     Problem List Items Addressed This Visit     Hyperlipidemia     Hyperlipidemia  Patient will have lipid panel blood work and continue with current dose of statin therapy         Hypothyroidism     Hypothyroidism  Patient will have TSH blood work and continue with current dose of levothyroxine           Other Visit Diagnoses     Type 2 diabetes mellitus without complication, without long-term current use of insulin ([address] )    -  Primary    Relevant Orders    CBC    Comprehensive metabolic panel    Lipid panel    TSH, 3rd generation    HEMOGLOBIN A1C W/ EAG ESTIMATION        Had a long discussion with the patient regarding her work situation and her 's health  There are no Patient Instructions on file for this visit  Chief Complaint     Chief Complaint   Patient presents with    Follow-up     Pt  here for 6 month follow up       History of Present Illness     Patient has been having increased stress related to her work situation  She is looking to retire in 2019 when she turns 71  She has been under more stress due to her 's health regarding his chronic back pain which causes her to take on more home responsibilities  Review of Systems   Review of Systems   Constitutional: Negative  HENT: Negative  Respiratory: Negative  Cardiovascular: Negative  Gastrointestinal: Negative  Genitourinary: Negative  Neurological: Negative      Psychiatric/Behavioral:        As per HPI       Active Problem List     Patient Active Problem List   Diagnosis    Hyperlipidemia    Esophagitis, reflux    Hypothyroidism    Sleep apnea    Vitamin D deficiency       Past Medical History:  Past Medical History:   Diagnosis Date    Asthma     Glaucoma        Past Surgical History:  No past surgical history on file  Family History:  Family History   Problem Relation Age of Onset    Heart disease Mother     Other Maternal Grandmother      Thyroid disorder       Social History:  Social History     Social History    Marital status: /Civil Union     Spouse name: N/A    Number of children: N/A    Years of education: N/A     Occupational History    Not on file  Social History Main Topics    Smoking status: Never Smoker    Smokeless tobacco: Never Used      Comment: Onset Date:  11/23/16    Alcohol use Yes      Comment: social    Drug use: No    Sexual activity: Not on file     Other Topics Concern    Not on file     Social History Narrative    No narrative on file       Objective     Vitals:    05/16/18 1045   BP: 124/82   Pulse: 72   Resp: 12   Temp: 98 1 °F (36 7 °C)     Wt Readings from Last 3 Encounters:   05/16/18 88 7 kg (195 lb 9 6 oz)   11/15/17 87 7 kg (193 lb 4 oz)   05/24/17 85 8 kg (189 lb 4 oz)       Physical Exam   Constitutional: No distress  HENT:   Mouth/Throat: No oropharyngeal exudate  Tympanic membranes within normal limits bilaterally   Neck:   No carotid bruits   Cardiovascular: Pulses are no weak pulses  Pulses:       Dorsalis pedis pulses are 2+ on the right side, and 2+ on the left side  Posterior tibial pulses are 2+ on the right side, and 2+ on the left side  Regular rate and rhythm with no murmurs   Pulmonary/Chest:   Lungs are clear to auscultation without wheezes,rales, or rhonchi   Abdominal:   Abdomen is soft, nontender with positive bowel sounds  There is no rebound or guarding  No masses palpated   Musculoskeletal: She exhibits no edema  Feet:   Right Foot:   Skin Integrity: Negative for ulcer, skin breakdown, erythema, warmth, callus or dry skin  Left Foot:   Skin Integrity: Negative for ulcer, skin breakdown, erythema, warmth, callus or dry skin  Lymphadenopathy:     She has no cervical adenopathy     Psychiatric: She has a normal mood and affect  Her behavior is normal  Judgment and thought content normal      Patient's shoes and socks removed  Right Foot/Ankle   Right Foot Inspection  Skin Exam: skin normal and skin intact no dry skin, no warmth, no callus, no erythema, no maceration, no abnormal color, no pre-ulcer, no ulcer and no callus                          Toe Exam: ROM and strength within normal limits  Sensory     Proprioception: intact     Vascular    The right DP pulse is 2+  The right PT pulse is 2+  Left Foot/Ankle  Left Foot Inspection  Skin Exam: skin normal and skin intactno dry skin, no warmth, no erythema, no maceration, normal color, no pre-ulcer, no ulcer and no callus                         Toe Exam: ROM and strength within normal limits                   Sensory     Proprioception: intact    Vascular    The left DP pulse is 2+  The left PT pulse is 2+  Assign Risk Category:  No deformity present; No loss of protective sensation;  No weak pulses       Risk: 0    Pertinent Laboratory/Diagnostic Studies:  Lab Results   Component Value Date    GLUCOSE 106 02/27/2015    BUN 14 11/02/2017    CREATININE 0 86 11/02/2017    CALCIUM 9 5 11/02/2017     11/02/2017    K 4 4 11/02/2017    CO2 27 11/02/2017     11/02/2017     Lab Results   Component Value Date    ALT 45 (H) 11/02/2017    AST 37 (H) 11/02/2017    ALKPHOS 69 11/02/2017    BILITOT 0 6 11/02/2017       Lab Results   Component Value Date    WBC 5 0 11/02/2017    HGB 14 1 11/02/2017    HCT 42 7 11/02/2017    MCV 91 2 11/02/2017     11/02/2017       No results found for: TSH    Lab Results   Component Value Date    CHOL 189 11/02/2017     Lab Results   Component Value Date    TRIG 124 11/02/2017     Lab Results   Component Value Date    HDL 49 (L) 11/02/2017     No results found for: LDLCALC  No results found for: HGBA1C    Results for orders placed or performed in visit on 11/03/17   TSH, 3RD GENERATION (HISTORICAL)   Result Value Ref Range TSH 3RD GENERATON 2 03 0 40 - 4 50 mIU/L   COMPREHENSIVE METABOLIC PANEL (HISTORICAL)   Result Value Ref Range    Glucose 97 65 - 99 mg/dL    BUN 14 7 - 25 mg/dL    Creatinine 0 86 0 50 - 0 99 mg/dL    EGFR-AMERICAN CALC 72 > OR = 60 mL/min/1 73m2    eGFR  83 > OR = 60 mL/min/1 73m2    BUN/CREA Ratio NOT APPLICABLE 6 - 22 (calc)    Sodium 140 135 - 146 mmol/L    Potassium 4 4 3 5 - 5 3 mmol/L    Chloride 104 98 - 110 mmol/L    CO2 27 20 - 31 mmol/L    Calcium 9 5 8 6 - 10 4 mg/dL    Total Protein 7 6 6 1 - 8 1 g/dL    Albumin 4 4 3 6 - 5 1 g/dL    GAMMA GLOBULIN 3 2 1 9 - 3 7 g/dL (calc)    A/G RATIO 1 4 1 0 - 2 5 (calc)    Total Bilirubin 0 6 0 2 - 1 2 mg/dL    Alkaline Phosphatase 69 33 - 130 U/L    AST 37 (H) 10 - 35 U/L    ALT 45 (H) 6 - 29 U/L   CBC AND DIFFERENTIAL (HISTORICAL)   Result Value Ref Range    WBC 5 0 3 8 - 10 8 Thousand/uL    RBC 4 68 3 80 - 5 10 Million/uL    Hemoglobin 14 1 11 7 - 15 5 g/dL    Hematocrit 42 7 35 0 - 45 0 %    MCV 91 2 80 0 - 100 0 fL    MCH 30 1 27 0 - 33 0 pg    MCHC 33 0 32 0 - 36 0 g/dL    RDW 13 1 11 0 - 15 0 %    Platelets 368 031 - 175 Thousand/uL    Neutrophils Absolute 3115 1500 - 7800 cells/uL    Lymphocytes Absolute 1330 850 - 3900 cells/uL    Monocytes Absolute 355 200 - 950 cells/uL    Eosinophils Absolute 160 15 - 500 cells/uL    Basophils Absolute 40 0 - 200 cells/uL    Neutrophils Absolute 62 3 %    Lymphocytes Absolute 26 6 %    MONOCYTES 7 1 %    Eosinophils Absolute 3 2 %    Basophils Relative 0 8 %    MPV 10 0 7 5 - 12 5 fL   LIPID PANEL (HISTORICAL)   Result Value Ref Range    Cholesterol 189 <200 mg/dL    HDL 49 (L) >50 mg/dL    Triglycerides 124 <150 mg/dL    LDL CHOLESTEROL 117 (H) mg/dL (calc)    Chol/HDL Ratio 3 9 <5 0 (calc)    NON-HDL-CHOL (CHOL-HDL) 140 (H) <130 mg/dL (calc)       Orders Placed This Encounter   Procedures    CBC    Comprehensive metabolic panel    Lipid panel    TSH, 3rd generation    HEMOGLOBIN A1C W/ EAG ESTIMATION       ALLERGIES:  Allergies   Allergen Reactions    Codeine Nausea Only and Vomiting     Reaction Date: 24Aug2011;     Other     Oxycodone-Acetaminophen Nausea Only and Vomiting     Reaction Date: 24Aug2011;     Sulfa Antibiotics     Vicodin  [Hydrocodone-Acetaminophen] Nausea Only and Vomiting     Reaction Date: 24Aug2011;        Current Medications     Current Outpatient Prescriptions   Medication Sig Dispense Refill    budesonide-formoterol (SYMBICORT) 160-4 5 mcg/act inhaler Inhale Twice daily      Cholecalciferol ([employer] VITAMIN D) 2000 units CAPS Take 1 capsule by mouth daily      EPINEPHrine (EPIPEN 2-CHEIKH) 0 3 mg/0 3 mL SOAJ Inject 0 3 mL as directed      fluticasone (FLONASE) 50 mcg/act nasal spray 2 sprays into each nostril daily      levalbuterol (XOPENEX HFA) 45 mcg/act inhaler Inhale 2 puffs      levothyroxine 25 mcg tablet Take by mouth      loratadine (CLARITIN) 10 mg tablet Take 1 tablet by mouth daily      Multiple Vitamin (MULTI-VITAMIN DAILY PO) Take 1 tablet by mouth daily      Omega-3 Fatty Acids (EQL OMEGA 3 FISH OIL) 1000 MG CAPS Take 1 capsule by mouth daily      pantoprazole (PROTONIX) 40 mg tablet Take 1 tablet by mouth daily      simvastatin (ZOCOR) 10 mg tablet Take 1 tablet (10 mg total) by mouth daily 90 tablet 1     No current facility-administered medications for this visit            Health Maintenance     Health Maintenance   Topic Date Due    HIV SCREENING  1955    Hepatitis C Screening  1955    HEMOGLOBIN A1C  1955    Diabetic Foot Exam  03/10/1964    OPHTHALMOLOGY EXAM  03/10/1964    URINE MICROALBUMIN  03/10/1964    Depression Screening PHQ-9  03/10/1966    DTaP,Tdap,and Td Vaccines (1 - Tdap) 06/02/1999    INFLUENZA VACCINE  09/01/2018    COLONOSCOPY  09/25/2023     Immunization History   Administered Date(s) Administered    H1N1, All Formulations 10/30/2009    Influenza 10/01/2015, 10/04/2016    Influenza TIV (IM) 10/01/2008, 10/01/2016, 10/02/2017    Td (adult), adsorbed 06/01/1999    Zoster 04/30/2014, 01/64/6760       Padmini Hooker MD

## 2018-05-19 LAB
ALBUMIN SERPL-MCNC: 4.3 G/DL (ref 3.6–5.1)
ALBUMIN/GLOB SERPL: 1.4 (CALC) (ref 1–2.5)
ALP SERPL-CCNC: 69 U/L (ref 33–130)
ALT SERPL-CCNC: 38 U/L (ref 6–29)
AST SERPL-CCNC: 29 U/L (ref 10–35)
BASOPHILS # BLD AUTO: 38 CELLS/UL (ref 0–200)
BASOPHILS NFR BLD AUTO: 0.8 %
BILIRUB SERPL-MCNC: 0.5 MG/DL (ref 0.2–1.2)
BUN SERPL-MCNC: 15 MG/DL (ref 7–25)
BUN/CREAT SERPL: ABNORMAL (CALC) (ref 6–22)
CALCIUM SERPL-MCNC: 9.1 MG/DL (ref 8.6–10.4)
CHLORIDE SERPL-SCNC: 106 MMOL/L (ref 98–110)
CHOLEST SERPL-MCNC: 192 MG/DL
CHOLEST/HDLC SERPL: 4.4 (CALC)
CO2 SERPL-SCNC: 25 MMOL/L (ref 20–31)
CREAT SERPL-MCNC: 0.75 MG/DL (ref 0.5–0.99)
EOSINOPHIL # BLD AUTO: 179 CELLS/UL (ref 15–500)
EOSINOPHIL NFR BLD AUTO: 3.8 %
ERYTHROCYTE [DISTWIDTH] IN BLOOD BY AUTOMATED COUNT: 13 % (ref 11–15)
EST. AVERAGE GLUCOSE BLD GHB EST-MCNC: 128 (CALC)
EST. AVERAGE GLUCOSE BLD GHB EST-SCNC: 7.1 (CALC)
GLOBULIN SER CALC-MCNC: 3.1 G/DL (CALC) (ref 1.9–3.7)
GLUCOSE SERPL-MCNC: 95 MG/DL (ref 65–99)
HBA1C MFR BLD: 6.1 % OF TOTAL HGB
HCT VFR BLD AUTO: 41.2 % (ref 35–45)
HDLC SERPL-MCNC: 44 MG/DL
HGB BLD-MCNC: 13.6 G/DL (ref 11.7–15.5)
LDLC SERPL CALC-MCNC: 122 MG/DL (CALC)
LYMPHOCYTES # BLD AUTO: 1607 CELLS/UL (ref 850–3900)
LYMPHOCYTES NFR BLD AUTO: 34.2 %
MCH RBC QN AUTO: 30.4 PG (ref 27–33)
MCHC RBC AUTO-ENTMCNC: 33 G/DL (ref 32–36)
MCV RBC AUTO: 92.2 FL (ref 80–100)
MONOCYTES # BLD AUTO: 348 CELLS/UL (ref 200–950)
MONOCYTES NFR BLD AUTO: 7.4 %
NEUTROPHILS # BLD AUTO: 2529 CELLS/UL (ref 1500–7800)
NEUTROPHILS NFR BLD AUTO: 53.8 %
NONHDLC SERPL-MCNC: 148 MG/DL (CALC)
PLATELET # BLD AUTO: 251 THOUSAND/UL (ref 140–400)
PMV BLD REES-ECKER: 9.7 FL (ref 7.5–12.5)
POTASSIUM SERPL-SCNC: 4.4 MMOL/L (ref 3.5–5.3)
PROT SERPL-MCNC: 7.4 G/DL (ref 6.1–8.1)
RBC # BLD AUTO: 4.47 MILLION/UL (ref 3.8–5.1)
SL AMB EGFR AFRICAN AMERICAN: 98 ML/MIN/1.73M2
SL AMB EGFR NON AFRICAN AMERICAN: 84 ML/MIN/1.73M2
SODIUM SERPL-SCNC: 140 MMOL/L (ref 135–146)
TRIGL SERPL-MCNC: 138 MG/DL
TSH SERPL-ACNC: 1.53 MIU/L (ref 0.4–4.5)
WBC # BLD AUTO: 4.7 THOUSAND/UL (ref 3.8–10.8)

## 2018-05-21 ENCOUNTER — TELEPHONE (OUTPATIENT)
Dept: FAMILY MEDICINE CLINIC | Facility: CLINIC | Age: 63
End: 2018-05-21

## 2018-11-04 DIAGNOSIS — K21.9 GASTROESOPHAGEAL REFLUX DISEASE WITHOUT ESOPHAGITIS: Primary | ICD-10-CM

## 2018-11-04 DIAGNOSIS — E03.9 HYPOTHYROIDISM, UNSPECIFIED TYPE: ICD-10-CM

## 2018-11-05 RX ORDER — LEVOTHYROXINE SODIUM 0.03 MG/1
TABLET ORAL
Qty: 116 TABLET | Refills: 3 | Status: SHIPPED | OUTPATIENT
Start: 2018-11-05 | End: 2019-04-19 | Stop reason: SDUPTHER

## 2018-11-05 RX ORDER — PANTOPRAZOLE SODIUM 40 MG/1
TABLET, DELAYED RELEASE ORAL
Qty: 90 TABLET | Refills: 3 | Status: SHIPPED | OUTPATIENT
Start: 2018-11-05 | End: 2019-04-19 | Stop reason: SDUPTHER

## 2018-11-26 ENCOUNTER — OFFICE VISIT (OUTPATIENT)
Dept: SLEEP CENTER | Facility: CLINIC | Age: 63
End: 2018-11-26
Payer: COMMERCIAL

## 2018-11-26 ENCOUNTER — OFFICE VISIT (OUTPATIENT)
Dept: FAMILY MEDICINE CLINIC | Facility: CLINIC | Age: 63
End: 2018-11-26
Payer: COMMERCIAL

## 2018-11-26 VITALS
BODY MASS INDEX: 37.19 KG/M2 | SYSTOLIC BLOOD PRESSURE: 112 MMHG | HEART RATE: 68 BPM | WEIGHT: 197 LBS | HEIGHT: 61 IN | DIASTOLIC BLOOD PRESSURE: 74 MMHG

## 2018-11-26 VITALS
SYSTOLIC BLOOD PRESSURE: 142 MMHG | BODY MASS INDEX: 36.85 KG/M2 | TEMPERATURE: 98.8 F | HEIGHT: 61 IN | DIASTOLIC BLOOD PRESSURE: 80 MMHG | HEART RATE: 80 BPM | RESPIRATION RATE: 16 BRPM | WEIGHT: 195.2 LBS

## 2018-11-26 DIAGNOSIS — L30.9 ECZEMA, UNSPECIFIED TYPE: ICD-10-CM

## 2018-11-26 DIAGNOSIS — R00.2 PALPITATIONS: ICD-10-CM

## 2018-11-26 DIAGNOSIS — J45.20 MILD INTERMITTENT ASTHMA WITHOUT COMPLICATION: ICD-10-CM

## 2018-11-26 DIAGNOSIS — E11.9 TYPE 2 DIABETES MELLITUS WITHOUT COMPLICATION, WITHOUT LONG-TERM CURRENT USE OF INSULIN (HCC): Primary | ICD-10-CM

## 2018-11-26 DIAGNOSIS — K21.9 GASTROESOPHAGEAL REFLUX DISEASE WITHOUT ESOPHAGITIS: ICD-10-CM

## 2018-11-26 DIAGNOSIS — G47.33 OSA (OBSTRUCTIVE SLEEP APNEA): Primary | ICD-10-CM

## 2018-11-26 DIAGNOSIS — F51.12 INSUFFICIENT SLEEP SYNDROME: ICD-10-CM

## 2018-11-26 DIAGNOSIS — E03.9 HYPOTHYROIDISM, UNSPECIFIED TYPE: ICD-10-CM

## 2018-11-26 DIAGNOSIS — E66.9 OBESITY (BMI 30-39.9): ICD-10-CM

## 2018-11-26 DIAGNOSIS — J31.0 CHRONIC RHINITIS: ICD-10-CM

## 2018-11-26 DIAGNOSIS — E78.5 HYPERLIPIDEMIA, UNSPECIFIED HYPERLIPIDEMIA TYPE: ICD-10-CM

## 2018-11-26 PROCEDURE — 3008F BODY MASS INDEX DOCD: CPT | Performed by: FAMILY MEDICINE

## 2018-11-26 PROCEDURE — 1036F TOBACCO NON-USER: CPT | Performed by: FAMILY MEDICINE

## 2018-11-26 PROCEDURE — 99214 OFFICE O/P EST MOD 30 MIN: CPT | Performed by: INTERNAL MEDICINE

## 2018-11-26 PROCEDURE — 99214 OFFICE O/P EST MOD 30 MIN: CPT | Performed by: FAMILY MEDICINE

## 2018-11-26 RX ORDER — TRIAMCINOLONE ACETONIDE 1 MG/ML
LOTION TOPICAL 2 TIMES DAILY
Qty: 60 ML | Refills: 2 | Status: SHIPPED | OUTPATIENT
Start: 2018-11-26 | End: 2019-11-19

## 2018-11-26 RX ORDER — SIMVASTATIN 10 MG
10 TABLET ORAL DAILY
Qty: 90 TABLET | Refills: 3 | Status: SHIPPED | OUTPATIENT
Start: 2018-11-26 | End: 2019-04-19 | Stop reason: SDUPTHER

## 2018-11-26 NOTE — PROGRESS NOTES
Follow-Up Note - Nav 47  58 y o  female  NJY:[birth year]  BWY:[id number]    CC: I saw this patient for follow-up in clinic today for her Sleep Disordered Breathing, Coexisting Sleep and Medical Problems  PFSH, Problem List, Medications & Allergies were reviewed in EMR  Interval changes: none reported  She  has a past medical history of Asthma and Glaucoma  She has a current medication list which includes the following prescription(s): budesonide-formoterol, cholecalciferol, co-enzyme q-10, epinephrine, fluticasone, levothyroxine, loratadine, milk thistle, multiple vitamin, eql omega 3 fish oil, pantoprazole, simvastatin, and levalbuterol  ROS: Reviewed (see attached)  Acid reflux, allergies and asthma controlled  She has infrequent palpitations  SUBJECTIVE: Afton Harada reports minor difficulty tolerating PAP; With respect to compliance, data download shows:  using PAP > 4 hours/night 97% of the time  PARAG (estimated) 04/hour at 90th percentile pressure of 12 3cm H2O  · She is experiencing minor adverse effects: mask leaks , mask discomfort and mask causes redness / facial marks   · She is benefitting from use and reports: sleeping better   Sleep Routine: She reports getting 6-7 hours sleep  ; she has no difficulty initiating or maintaining sleep   She awakens spontaneously feeling refreshed  She denied excessive drowsiness   She rated herself at Total score: 3 /24 on the Norco sleepiness scale  Habits: reports that she has never smoked  She has never used smokeless tobacco ,  reports that she drinks alcohol ,  reports that she does not use drugs  , Caffeine use: none , Exercise routine: none   OBJECTIVE: /74   Pulse 68   Ht 5' 1" (1 549 m)   Wt 89 4 kg (197 lb)   BMI 37 22 kg/m²    Patient is well groomed; well appearing  Skin/Extrem: warm & dry; col & hydration normal; no edema  Psych: cooperativeand in no distress   Mental state appears normal   CNS: Alert, orientated, clear & coherent speech  H&N: EOMI; NC/AT:no facial pressure marks, no rashes  Neck Circumference: 40 5 cm  ENMT Mucus membranes normal Nasal airway: patent  Oral airway: crowded  Resp:effort is normal CVS: RRR ABD:truncal obesity MSK:Gait normal     ASSESSMENT: Primary Sleep/Secondary(to Medical or Psych conditions) & comorbidities   1  EDEL (obstructive sleep apnea)  Sleep F/U  - established patient    PAP DME Resupply/Reorder   2  Insufficient sleep syndrome     3  Chronic rhinitis     4  Mild intermittent asthma without complication     5  Palpitations     6  Gastroesophageal reflux disease without esophagitis     7  Obesity (BMI 30-39  9)       PLAN:  1  Treatment with  PAP is medically necessary and Pete Calles is agreable to continue use  2  Care of equipment, methods to improve comfort using PAP and importance of compliance with therapy were discussed  3  Pressure setting:  Continue 11-15 cm H2O     4  Rx provided to replace supplies and Care coordinated with DME provider  5  Strategies for weight reduction were discussed  6  Follow-up is advised in 1 year or sooner if needed to monitor progress, compliance and to adjust therapy  Thank you for allowing me to participate in the care of this patient      Sincerely,    Authenticated electronically by Dang Chavez MD on 35/72/47   Board Certified Specialist

## 2018-11-26 NOTE — PROGRESS NOTES
Review of Systems      Genitourinary post menopausal (no peroids)   Cardiology none   Gastrointestinal frequent heartburn/acid reflux   Neurology numbness/tingling of an extremity   Constitutional none   Integumentary itching   Psychiatry none   Musculoskeletal none   Pulmonary none   ENT none   Endocrine none   Hematological none

## 2018-11-27 ENCOUNTER — TELEPHONE (OUTPATIENT)
Dept: FAMILY MEDICINE CLINIC | Facility: CLINIC | Age: 63
End: 2018-11-27

## 2018-11-27 DIAGNOSIS — E11.8 TYPE 2 DIABETES MELLITUS WITH COMPLICATION, WITHOUT LONG-TERM CURRENT USE OF INSULIN (HCC): Primary | ICD-10-CM

## 2018-11-27 NOTE — TELEPHONE ENCOUNTER
Patient was here yesterday and stated that on her summary it states that she has type 2 diabetes without complication  She states that she was never told about having diabetes and would like a phone call back to discuss this further   Please call to advise

## 2018-11-27 NOTE — TELEPHONE ENCOUNTER
Her sugars are slightly above normal with an A1C at 6 1  (nl 5 7) but lower than true diabetic (>6 3)  Diabetes is not currently on her problem list of diagnoses   She should still continue attempts to lose weight so that she does not continue to have increase in her blood sugars

## 2018-11-27 NOTE — TELEPHONE ENCOUNTER
Spoke with pt, MD instructions given  Order placed for medical nutrition and diabetic nutrition sent to pt

## 2018-11-28 PROBLEM — R73.9 HYPERGLYCEMIA: Status: ACTIVE | Noted: 2018-11-28

## 2018-11-28 NOTE — ASSESSMENT & PLAN NOTE
Hyperglycemia  Patient will check hemoglobin A1c    If value is elevated patient will be referred to diabetic nutritional counseling to better improve her dietary choices and avoid having to take prescription medication

## 2018-11-28 NOTE — PROGRESS NOTES
FAMILY PRACTICE OFFICE VISIT       NAME: Cynthia Reyes  AGE: 58 y o  SEX: female       : 1955        MRN: [id number]    DATE: 2018  TIME: 7:20 AM    Assessment and Plan     Problem List Items Addressed This Visit     Hyperlipidemia     Hyperlipidemia  Patient will have lipid panel blood work and continue with current dose of statin therapy         Relevant Medications    simvastatin (ZOCOR) 10 mg tablet    Hypothyroidism     Hypothyroidism  Patient will have TSH blood work and continue with current dose of levothyroxine           Other Visit Diagnoses     Type 2 diabetes mellitus without complication, without long-term current use of insulin ([facility] [address] )    -  Primary    Relevant Orders    CBC    Comprehensive metabolic panel    Lipid panel    TSH, 3rd generation    Hemoglobin A1C    Eczema, unspecified type        Relevant Medications    triamcinolone (KENALOG) 0 1 % lotion            There are no Patient Instructions on file for this visit  Chief Complaint     Chief Complaint   Patient presents with    Follow-up     Pt is here for 6 month follow up     Itching     Pt also has c/o itchness of arms        History of Present Illness     Patient denies any recent illness  She is frustrated with her job situation and is planning to retire in 2019  Unfortunately her  has rheumatoid arthritis is having failing health and will require more care in the future  Patient has been trying to improve her diet attempts to lose weight  Review of Systems   Review of Systems   Constitutional: Negative  HENT: Negative  Respiratory: Negative  Cardiovascular: Negative  Gastrointestinal: Negative  Genitourinary: Negative  Musculoskeletal: Negative  Neurological: Negative  Psychiatric/Behavioral: Negative          Active Problem List     Patient Active Problem List   Diagnosis    Hyperlipidemia    Esophagitis, reflux    Hypothyroidism    EDEL (obstructive sleep apnea)    Vitamin D deficiency    Insufficient sleep syndrome    Chronic rhinitis    Mild intermittent asthma without complication    Gastroesophageal reflux disease without esophagitis    Obesity (BMI 30-39  9)    Palpitations    Hyperglycemia       Past Medical History:  Past Medical History:   Diagnosis Date    Asthma     Glaucoma        Past Surgical History:  No past surgical history on file  Family History:  Family History   Problem Relation Age of Onset    Heart disease Mother     Other Maternal Grandmother         Thyroid disorder       Social History:  Social History     Social History    Marital status: /Civil Union     Spouse name: N/A    Number of children: N/A    Years of education: N/A     Occupational History    Not on file  Social History Main Topics    Smoking status: Never Smoker    Smokeless tobacco: Never Used    Alcohol use Yes      Comment: social    Drug use: No    Sexual activity: Not on file     Other Topics Concern    Not on file     Social History Narrative    No narrative on file       Objective     Vitals:    11/26/18 1300   BP: 142/80   Pulse: 80   Resp: 16   Temp: 98 8 °F (37 1 °C)     Wt Readings from Last 3 Encounters:   11/26/18 88 5 kg (195 lb 3 2 oz)   11/26/18 89 4 kg (197 lb)   05/16/18 88 7 kg (195 lb 9 6 oz)       Physical Exam   Constitutional: She is oriented to person, place, and time  No distress  Neck:   No carotid bruits   Cardiovascular:   Regular rate and rhythm with no murmurs   Pulmonary/Chest:   Lungs are clear to auscultation without wheezes,rales, or rhonchi   Musculoskeletal: She exhibits no edema  Lymphadenopathy:     She has no cervical adenopathy  Neurological: She is alert and oriented to person, place, and time  No cranial nerve deficit  Psychiatric: She has a normal mood and affect   Her behavior is normal  Judgment and thought content normal        Pertinent Laboratory/Diagnostic Studies:  Lab Results   Component Value Date    GLUCOSE 106 02/27/2015    BUN 15 05/18/2018    CREATININE 0 86 11/02/2017    CALCIUM 9 1 05/18/2018     11/02/2017    K 4 4 05/18/2018    CO2 25 05/18/2018     05/18/2018     Lab Results   Component Value Date    ALT 45 (H) 11/02/2017    AST 37 (H) 11/02/2017    ALKPHOS 69 05/18/2018    BILITOT 0 6 11/02/2017       Lab Results   Component Value Date    WBC 4 7 05/18/2018    HGB 13 6 05/18/2018    HCT 41 2 05/18/2018    MCV 92 2 05/18/2018     05/18/2018       Lab Results   Component Value Date    TSH 1 53 05/18/2018       Lab Results   Component Value Date    CHOL 189 11/02/2017     Lab Results   Component Value Date    TRIG 138 05/18/2018     Lab Results   Component Value Date    HDL 44 (L) 05/18/2018     No results found for: [facility]  Lab Results   Component Value Date    HGBA1C 6 1 (H) 05/18/2018       Results for orders placed or performed in visit on 05/18/18   Lipid panel   Result Value Ref Range    Total Cholesterol 192 <200 mg/dL    HDL 44 (L) >50 mg/dL    Triglycerides 138 <150 mg/dL    LDL Direct 122 (H) mg/dL (calc)    Chol HDLC Ratio 4 4 <5 0 (calc)    Non-HDL Cholesterol 148 (H) <130 mg/dL (calc)   Comprehensive metabolic panel   Result Value Ref Range    Glucose, Random 95 65 - 99 mg/dL    BUN 15 7 - 25 mg/dL    Creatinine 0 75 0 50 - 0 99 mg/dL    eGFR Non  84 > OR = 60 mL/min/1 73m2    SL AMB EGFR  98 > OR = 60 mL/min/1 73m2    SL AMB BUN/CREATININE RATIO NOT APPLICABLE 6 - 22 (calc)    Sodium 140 135 - 146 mmol/L    Potassium 4 4 3 5 - 5 3 mmol/L    Chloride 106 98 - 110 mmol/L    CO2 25 20 - 31 mmol/L    SL AMB CALCIUM 9 1 8 6 - 10 4 mg/dL    SL AMB PROTEIN, TOTAL 7 4 6 1 - 8 1 g/dL    Albumin 4 3 3 6 - 5 1 g/dL    Globulin 3 1 1 9 - 3 7 g/dL (calc)    Albumin/Globulin Ratio 1 4 1 0 - 2 5 (calc)    TOTAL BILIRUBIN 0 5 0 2 - 1 2 mg/dL    Alkaline Phosphatase 69 33 - 130 U/L    SL AMB AST 29 10 - 35 U/L    SL AMB ALT 38 (H) 6 - 29 U/L CBC and differential   Result Value Ref Range    White Blood Cell Count 4 7 3 8 - 10 8 Thousand/uL    Red Blood Cell Count 4 47 3 80 - 5 10 Million/uL    Hemoglobin 13 6 11 7 - 15 5 g/dL    HCT 41 2 35 0 - 45 0 %    MCV 92 2 80 0 - 100 0 fL    MCH 30 4 27 0 - 33 0 pg    MCHC 33 0 32 0 - 36 0 g/dL    RDW 13 0 11 0 - 15 0 %    Platelet Count 665 780 - 400 Thousand/uL    SL AMB MPV 9 7 7 5 - 12 5 fL    Neutrophils (Absolute) 2,529 1,500 - 7,800 cells/uL    Lymphocytes (Absolute) 1,607 850 - 3,900 cells/uL    Monocytes (Absolute) 348 200 - 950 cells/uL    Eosinophils (Absolute) 179 15 - 500 cells/uL    Basophils ABS 38 0 - 200 cells/uL    Neutrophils 53 8 %    Lymphocytes 34 2 %    Monocytes 7 4 %    Eosinophils 3 8 %    Basophils PCT 0 8 %   TSH, 3rd generation   Result Value Ref Range    TSH 1 53 0 40 - 4 50 mIU/L   Hemoglobin A1C With EAG   Result Value Ref Range    Hemoglobin A1C 6 1 (H) <5 7 % of total Hgb    Estimated Average Glucose 128 (calc)    Estimated Average Glucose (mmol/L) 7 1 (calc)       Orders Placed This Encounter   Procedures    CBC    Comprehensive metabolic panel    Lipid panel    TSH, 3rd generation    Hemoglobin A1C       ALLERGIES:  Allergies   Allergen Reactions    Bee Venom Anaphylaxis     Hornets, wasp, and bees    Codeine Nausea Only and Vomiting     Reaction Date: 24Aug2011;     Other      Narcotic ---nausea and vomiting     Oxycodone-Acetaminophen Nausea Only and Vomiting     Reaction Date: 24Aug2011;     Vicodin  [Hydrocodone-Acetaminophen] Nausea Only and Vomiting     Reaction Date: 24Aug2011;     Sulfa Antibiotics Rash       Current Medications     Current Outpatient Prescriptions   Medication Sig Dispense Refill    budesonide-formoterol (SYMBICORT) 160-4 5 mcg/act inhaler Inhale Twice daily      Cholecalciferol ([employer]S VITAMIN D) 2000 units CAPS Take 1 capsule by mouth daily      co-enzyme Q-10 30 MG capsule Take 30 mg by mouth 3 (three) times a day      EPINEPHrine (EPIPEN 2-CHEIKH) 0 3 mg/0 3 mL SOAJ Inject 0 3 mL as directed      fluticasone (FLONASE) 50 mcg/act nasal spray 2 sprays into each nostril daily      levothyroxine 25 mcg tablet TAKE 1 TABLET ON MONDAY THROUGH FRIDAY AND 2 TABLETS ON SATURDAY AND SUNDAY 116 tablet 3    loratadine (CLARITIN) 10 mg tablet Take 1 tablet by mouth daily      milk thistle 175 MG tablet Take 175 mg by mouth daily      Multiple Vitamin (MULTI-VITAMIN DAILY PO) Take 1 tablet by mouth daily      Omega-3 Fatty Acids (EQL OMEGA 3 FISH OIL) 1000 MG CAPS Take 1 capsule by mouth daily      pantoprazole (PROTONIX) 40 mg tablet TAKE 1 TABLET DAILY 90 tablet 3    simvastatin (ZOCOR) 10 mg tablet Take 1 tablet (10 mg total) by mouth daily 90 tablet 3    triamcinolone (KENALOG) 0 1 % lotion Apply topically 2 (two) times a day 60 mL 2     No current facility-administered medications for this visit            Health Maintenance     Health Maintenance   Topic Date Due    Hepatitis C Screening  1955    DM Eye Exam  03/10/1964    URINE MICROALBUMIN  03/10/1964    Pneumococcal PPSV23 Medium Risk Adult (1 of 1 - PPSV23) 03/10/1973    DTaP,Tdap,and Td Vaccines (1 - Tdap) 06/02/1999    HEMOGLOBIN A1C  11/18/2018    Depression Screening PHQ  05/16/2019    Diabetic Foot Exam  05/16/2019    CRC Screening: Colonoscopy  09/25/2023    INFLUENZA VACCINE  Completed     Immunization History   Administered Date(s) Administered     Influenza (IM) Preservative Free 10/01/2018    H1N1, All Formulations 10/30/2009    Influenza 10/01/2015, 10/04/2016    Influenza TIV (IM) 10/01/2008, 10/01/2016, 10/02/2017    Td (adult), adsorbed 06/01/1999    Zoster 04/30/2014, 65/79/6963       Evaristo Dorsey MD

## 2018-12-04 ENCOUNTER — TELEPHONE (OUTPATIENT)
Dept: FAMILY MEDICINE CLINIC | Facility: CLINIC | Age: 63
End: 2018-12-04

## 2018-12-04 LAB
ALBUMIN SERPL-MCNC: 4.2 G/DL (ref 3.6–5.1)
ALBUMIN/GLOB SERPL: 1.3 (CALC) (ref 1–2.5)
ALP SERPL-CCNC: 67 U/L (ref 33–130)
ALT SERPL-CCNC: 33 U/L (ref 6–29)
AST SERPL-CCNC: 29 U/L (ref 10–35)
BASOPHILS # BLD AUTO: 41 CELLS/UL (ref 0–200)
BASOPHILS NFR BLD AUTO: 0.9 %
BILIRUB SERPL-MCNC: 0.4 MG/DL (ref 0.2–1.2)
BUN SERPL-MCNC: 13 MG/DL (ref 7–25)
BUN/CREAT SERPL: ABNORMAL (CALC) (ref 6–22)
CALCIUM SERPL-MCNC: 9.1 MG/DL (ref 8.6–10.4)
CHLORIDE SERPL-SCNC: 105 MMOL/L (ref 98–110)
CHOLEST SERPL-MCNC: 196 MG/DL
CHOLEST/HDLC SERPL: 4.4 (CALC)
CO2 SERPL-SCNC: 24 MMOL/L (ref 20–32)
CREAT SERPL-MCNC: 0.81 MG/DL (ref 0.5–0.99)
EOSINOPHIL # BLD AUTO: 189 CELLS/UL (ref 15–500)
EOSINOPHIL NFR BLD AUTO: 4.2 %
ERYTHROCYTE [DISTWIDTH] IN BLOOD BY AUTOMATED COUNT: 12.7 % (ref 11–15)
GLOBULIN SER CALC-MCNC: 3.3 G/DL (CALC) (ref 1.9–3.7)
GLUCOSE SERPL-MCNC: 98 MG/DL (ref 65–99)
HBA1C MFR BLD: 6.1 % OF TOTAL HGB
HCT VFR BLD AUTO: 42.9 % (ref 35–45)
HDLC SERPL-MCNC: 45 MG/DL
HGB BLD-MCNC: 14.2 G/DL (ref 11.7–15.5)
LDLC SERPL CALC-MCNC: 126 MG/DL (CALC)
LYMPHOCYTES # BLD AUTO: 1224 CELLS/UL (ref 850–3900)
LYMPHOCYTES NFR BLD AUTO: 27.2 %
MCH RBC QN AUTO: 30.4 PG (ref 27–33)
MCHC RBC AUTO-ENTMCNC: 33.1 G/DL (ref 32–36)
MCV RBC AUTO: 91.9 FL (ref 80–100)
MONOCYTES # BLD AUTO: 383 CELLS/UL (ref 200–950)
MONOCYTES NFR BLD AUTO: 8.5 %
NEUTROPHILS # BLD AUTO: 2664 CELLS/UL (ref 1500–7800)
NEUTROPHILS NFR BLD AUTO: 59.2 %
NONHDLC SERPL-MCNC: 151 MG/DL (CALC)
PLATELET # BLD AUTO: 211 THOUSAND/UL (ref 140–400)
PMV BLD REES-ECKER: 10.5 FL (ref 7.5–12.5)
POTASSIUM SERPL-SCNC: 4.3 MMOL/L (ref 3.5–5.3)
PROT SERPL-MCNC: 7.5 G/DL (ref 6.1–8.1)
RBC # BLD AUTO: 4.67 MILLION/UL (ref 3.8–5.1)
SL AMB EGFR AFRICAN AMERICAN: 89 ML/MIN/1.73M2
SL AMB EGFR NON AFRICAN AMERICAN: 77 ML/MIN/1.73M2
SODIUM SERPL-SCNC: 140 MMOL/L (ref 135–146)
TRIGL SERPL-MCNC: 140 MG/DL
TSH SERPL-ACNC: 1.95 MIU/L (ref 0.4–4.5)
WBC # BLD AUTO: 4.5 THOUSAND/UL (ref 3.8–10.8)

## 2018-12-04 NOTE — TELEPHONE ENCOUNTER
----- Message from Sherice Joyner MD sent at 44/3/7820 10:37 AM EST -----  All recent blood work stable for patient with no significant changes from previous blood work 6 months ago

## 2018-12-20 ENCOUNTER — LAB REQUISITION (OUTPATIENT)
Dept: LAB | Facility: HOSPITAL | Age: 63
End: 2018-12-20
Payer: COMMERCIAL

## 2018-12-20 DIAGNOSIS — K62.1 RECTAL POLYP: ICD-10-CM

## 2018-12-20 DIAGNOSIS — K57.30 DIVERTICULOSIS OF LARGE INTESTINE WITHOUT PERFORATION OR ABSCESS WITHOUT BLEEDING: ICD-10-CM

## 2018-12-20 DIAGNOSIS — Z86.010 HISTORY OF COLONIC POLYPS: ICD-10-CM

## 2018-12-20 PROCEDURE — 88305 TISSUE EXAM BY PATHOLOGIST: CPT | Performed by: PATHOLOGY

## 2019-04-19 DIAGNOSIS — E78.5 HYPERLIPIDEMIA, UNSPECIFIED HYPERLIPIDEMIA TYPE: ICD-10-CM

## 2019-04-19 DIAGNOSIS — E03.9 HYPOTHYROIDISM, UNSPECIFIED TYPE: ICD-10-CM

## 2019-04-19 DIAGNOSIS — K21.9 GASTROESOPHAGEAL REFLUX DISEASE WITHOUT ESOPHAGITIS: ICD-10-CM

## 2019-04-19 RX ORDER — LEVOTHYROXINE SODIUM 0.03 MG/1
TABLET ORAL
Qty: 116 TABLET | Refills: 1 | Status: SHIPPED | OUTPATIENT
Start: 2019-04-19 | End: 2019-10-12 | Stop reason: SDUPTHER

## 2019-04-19 RX ORDER — SIMVASTATIN 10 MG
10 TABLET ORAL DAILY
Qty: 90 TABLET | Refills: 1 | Status: SHIPPED | OUTPATIENT
Start: 2019-04-19 | End: 2019-10-11 | Stop reason: SDUPTHER

## 2019-04-19 RX ORDER — PANTOPRAZOLE SODIUM 40 MG/1
40 TABLET, DELAYED RELEASE ORAL DAILY
Qty: 90 TABLET | Refills: 1 | Status: SHIPPED | OUTPATIENT
Start: 2019-04-19 | End: 2019-10-12 | Stop reason: SDUPTHER

## 2019-05-06 ENCOUNTER — TRANSCRIBE ORDERS (OUTPATIENT)
Dept: ADMINISTRATIVE | Facility: HOSPITAL | Age: 64
End: 2019-05-06

## 2019-05-06 DIAGNOSIS — M81.0 OSTEOPOROSIS, UNSPECIFIED OSTEOPOROSIS TYPE, UNSPECIFIED PATHOLOGICAL FRACTURE PRESENCE: Primary | ICD-10-CM

## 2019-05-17 ENCOUNTER — HOSPITAL ENCOUNTER (OUTPATIENT)
Dept: MAMMOGRAPHY | Facility: HOSPITAL | Age: 64
Discharge: HOME | End: 2019-05-17
Payer: MEDICARE

## 2019-05-17 VITALS — HEIGHT: 61 IN | WEIGHT: 195 LBS | BODY MASS INDEX: 36.82 KG/M2

## 2019-05-17 DIAGNOSIS — Z13.9 ENCOUNTER FOR HEALTH-RELATED SCREENING: ICD-10-CM

## 2019-05-17 PROCEDURE — 77067 SCR MAMMO BI INCL CAD: CPT

## 2019-05-17 PROCEDURE — 77063 BREAST TOMOSYNTHESIS BI: CPT

## 2019-05-20 ENCOUNTER — OFFICE VISIT (OUTPATIENT)
Dept: FAMILY MEDICINE CLINIC | Facility: CLINIC | Age: 64
End: 2019-05-20
Payer: MEDICARE

## 2019-05-20 VITALS
BODY MASS INDEX: 36.47 KG/M2 | HEART RATE: 76 BPM | WEIGHT: 193.2 LBS | TEMPERATURE: 97.6 F | OXYGEN SATURATION: 95 % | SYSTOLIC BLOOD PRESSURE: 124 MMHG | DIASTOLIC BLOOD PRESSURE: 82 MMHG | HEIGHT: 61 IN | RESPIRATION RATE: 16 BRPM

## 2019-05-20 DIAGNOSIS — E11.8 TYPE 2 DIABETES MELLITUS WITH COMPLICATION, WITHOUT LONG-TERM CURRENT USE OF INSULIN (HCC): ICD-10-CM

## 2019-05-20 DIAGNOSIS — R20.2 PARESTHESIA: ICD-10-CM

## 2019-05-20 DIAGNOSIS — R73.9 HYPERGLYCEMIA: ICD-10-CM

## 2019-05-20 DIAGNOSIS — E03.9 HYPOTHYROIDISM, UNSPECIFIED TYPE: Primary | ICD-10-CM

## 2019-05-20 DIAGNOSIS — E78.5 HYPERLIPIDEMIA, UNSPECIFIED HYPERLIPIDEMIA TYPE: ICD-10-CM

## 2019-05-20 DIAGNOSIS — E55.9 VITAMIN D DEFICIENCY: ICD-10-CM

## 2019-05-20 DIAGNOSIS — K21.00 ESOPHAGITIS, REFLUX: ICD-10-CM

## 2019-05-20 PROCEDURE — 99214 OFFICE O/P EST MOD 30 MIN: CPT | Performed by: FAMILY MEDICINE

## 2019-05-20 PROCEDURE — G0402 INITIAL PREVENTIVE EXAM: HCPCS | Performed by: FAMILY MEDICINE

## 2019-05-28 ENCOUNTER — TRANSCRIBE ORDERS (OUTPATIENT)
Dept: ADMINISTRATIVE | Facility: HOSPITAL | Age: 64
End: 2019-05-28

## 2019-05-28 DIAGNOSIS — Z12.31 VISIT FOR SCREENING MAMMOGRAM: Primary | ICD-10-CM

## 2019-06-11 ENCOUNTER — HOSPITAL ENCOUNTER (OUTPATIENT)
Dept: RADIOLOGY | Age: 64
Discharge: HOME | End: 2019-06-11
Payer: MEDICARE

## 2019-06-11 DIAGNOSIS — M81.0 OSTEOPOROSIS, UNSPECIFIED OSTEOPOROSIS TYPE, UNSPECIFIED PATHOLOGICAL FRACTURE PRESENCE: ICD-10-CM

## 2019-06-11 PROCEDURE — 77080 DXA BONE DENSITY AXIAL: CPT

## 2019-06-27 ENCOUNTER — TELEPHONE (OUTPATIENT)
Dept: FAMILY MEDICINE CLINIC | Facility: CLINIC | Age: 64
End: 2019-06-27

## 2019-06-27 LAB
25(OH)D3 SERPL-MCNC: 39 NG/ML (ref 30–100)
ALBUMIN SERPL-MCNC: 4.2 G/DL (ref 3.6–5.1)
ALBUMIN/GLOB SERPL: 1.2 (CALC) (ref 1–2.5)
ALP SERPL-CCNC: 74 U/L (ref 33–130)
ALT SERPL-CCNC: 28 U/L (ref 6–29)
AST SERPL-CCNC: 24 U/L (ref 10–35)
BASOPHILS # BLD AUTO: 31 CELLS/UL (ref 0–200)
BASOPHILS NFR BLD AUTO: 0.8 %
BILIRUB SERPL-MCNC: 0.4 MG/DL (ref 0.2–1.2)
BUN SERPL-MCNC: 16 MG/DL (ref 7–25)
BUN/CREAT SERPL: NORMAL (CALC) (ref 6–22)
CALCIUM SERPL-MCNC: 9 MG/DL (ref 8.6–10.4)
CHLORIDE SERPL-SCNC: 106 MMOL/L (ref 98–110)
CHOLEST SERPL-MCNC: 198 MG/DL
CHOLEST/HDLC SERPL: 4.6 (CALC)
CO2 SERPL-SCNC: 25 MMOL/L (ref 20–32)
CREAT SERPL-MCNC: 0.83 MG/DL (ref 0.5–0.99)
EOSINOPHIL # BLD AUTO: 160 CELLS/UL (ref 15–500)
EOSINOPHIL NFR BLD AUTO: 4.1 %
ERYTHROCYTE [DISTWIDTH] IN BLOOD BY AUTOMATED COUNT: 12.8 % (ref 11–15)
GLOBULIN SER CALC-MCNC: 3.4 G/DL (CALC) (ref 1.9–3.7)
GLUCOSE SERPL-MCNC: 95 MG/DL (ref 65–99)
HBA1C MFR BLD: 6.1 % OF TOTAL HGB
HCT VFR BLD AUTO: 41.9 % (ref 35–45)
HDLC SERPL-MCNC: 43 MG/DL
HGB BLD-MCNC: 13.9 G/DL (ref 11.7–15.5)
LDLC SERPL CALC-MCNC: 126 MG/DL (CALC)
LYMPHOCYTES # BLD AUTO: 1307 CELLS/UL (ref 850–3900)
LYMPHOCYTES NFR BLD AUTO: 33.5 %
MCH RBC QN AUTO: 30.5 PG (ref 27–33)
MCHC RBC AUTO-ENTMCNC: 33.2 G/DL (ref 32–36)
MCV RBC AUTO: 92.1 FL (ref 80–100)
MONOCYTES # BLD AUTO: 257 CELLS/UL (ref 200–950)
MONOCYTES NFR BLD AUTO: 6.6 %
NEUTROPHILS # BLD AUTO: 2145 CELLS/UL (ref 1500–7800)
NEUTROPHILS NFR BLD AUTO: 55 %
NONHDLC SERPL-MCNC: 155 MG/DL (CALC)
PLATELET # BLD AUTO: 250 THOUSAND/UL (ref 140–400)
PMV BLD REES-ECKER: 9.8 FL (ref 7.5–12.5)
POTASSIUM SERPL-SCNC: 4.4 MMOL/L (ref 3.5–5.3)
PROT SERPL-MCNC: 7.6 G/DL (ref 6.1–8.1)
RBC # BLD AUTO: 4.55 MILLION/UL (ref 3.8–5.1)
SL AMB EGFR AFRICAN AMERICAN: 86 ML/MIN/1.73M2
SL AMB EGFR NON AFRICAN AMERICAN: 74 ML/MIN/1.73M2
SODIUM SERPL-SCNC: 140 MMOL/L (ref 135–146)
TRIGL SERPL-MCNC: 172 MG/DL
TSH SERPL-ACNC: 2.04 MIU/L (ref 0.4–4.5)
VIT B12 SERPL-MCNC: 405 PG/ML (ref 200–1100)
WBC # BLD AUTO: 3.9 THOUSAND/UL (ref 3.8–10.8)

## 2019-10-11 DIAGNOSIS — E78.5 HYPERLIPIDEMIA, UNSPECIFIED HYPERLIPIDEMIA TYPE: ICD-10-CM

## 2019-10-11 RX ORDER — SIMVASTATIN 10 MG
TABLET ORAL
Qty: 90 TABLET | Refills: 1 | Status: SHIPPED | OUTPATIENT
Start: 2019-10-11 | End: 2020-02-18 | Stop reason: SDUPTHER

## 2019-10-12 DIAGNOSIS — K21.9 GASTROESOPHAGEAL REFLUX DISEASE WITHOUT ESOPHAGITIS: ICD-10-CM

## 2019-10-12 DIAGNOSIS — E03.9 HYPOTHYROIDISM, UNSPECIFIED TYPE: ICD-10-CM

## 2019-10-12 RX ORDER — PANTOPRAZOLE SODIUM 40 MG/1
40 TABLET, DELAYED RELEASE ORAL DAILY
Qty: 90 TABLET | Refills: 1 | Status: SHIPPED | OUTPATIENT
Start: 2019-10-12 | End: 2020-04-06

## 2019-10-12 RX ORDER — LEVOTHYROXINE SODIUM 0.03 MG/1
TABLET ORAL
Qty: 116 TABLET | Refills: 1 | Status: SHIPPED | OUTPATIENT
Start: 2019-10-12 | End: 2020-04-27

## 2019-11-20 ENCOUNTER — OFFICE VISIT (OUTPATIENT)
Dept: FAMILY MEDICINE CLINIC | Facility: CLINIC | Age: 64
End: 2019-11-20
Payer: MEDICARE

## 2019-11-20 VITALS
TEMPERATURE: 97.8 F | OXYGEN SATURATION: 96 % | HEART RATE: 81 BPM | SYSTOLIC BLOOD PRESSURE: 120 MMHG | HEIGHT: 61 IN | RESPIRATION RATE: 18 BRPM | WEIGHT: 201.2 LBS | BODY MASS INDEX: 37.99 KG/M2 | DIASTOLIC BLOOD PRESSURE: 80 MMHG

## 2019-11-20 DIAGNOSIS — E78.5 HYPERLIPIDEMIA, UNSPECIFIED HYPERLIPIDEMIA TYPE: ICD-10-CM

## 2019-11-20 DIAGNOSIS — E11.8 TYPE 2 DIABETES MELLITUS WITH COMPLICATION, WITHOUT LONG-TERM CURRENT USE OF INSULIN (HCC): ICD-10-CM

## 2019-11-20 DIAGNOSIS — Z23 ENCOUNTER FOR IMMUNIZATION: Primary | ICD-10-CM

## 2019-11-20 DIAGNOSIS — E03.9 HYPOTHYROIDISM, UNSPECIFIED TYPE: ICD-10-CM

## 2019-11-20 DIAGNOSIS — L29.9 PRURITUS: ICD-10-CM

## 2019-11-20 DIAGNOSIS — R73.9 HYPERGLYCEMIA: ICD-10-CM

## 2019-11-20 PROCEDURE — 90732 PPSV23 VACC 2 YRS+ SUBQ/IM: CPT | Performed by: FAMILY MEDICINE

## 2019-11-20 PROCEDURE — 99214 OFFICE O/P EST MOD 30 MIN: CPT | Performed by: FAMILY MEDICINE

## 2019-11-20 PROCEDURE — G0009 ADMIN PNEUMOCOCCAL VACCINE: HCPCS | Performed by: FAMILY MEDICINE

## 2019-11-20 NOTE — ASSESSMENT & PLAN NOTE
Hyperglycemia  Patient will check hemoglobin A1c and continue her dietary efforts to control blood sugars    Patient was also recommended to have a more aerobic form of exercise

## 2019-11-20 NOTE — PROGRESS NOTES
FAMILY PRACTICE OFFICE VISIT       NAME: Cynthia Reyes  AGE: 71 y o  SEX: female       : 1955        MRN: [id number]    DATE: 2019  TIME: 11:41 AM    Assessment and Plan     Problem List Items Addressed This Visit        Endocrine    Hypothyroidism     Hypothyroidism  Patient will check TSH blood work and continue with current dose of levothyroxine            Musculoskeletal and Integument    Pruritus     Pruritus  Patient is going to be seeing her dermatologist next month  She will inquire as to her intermittent pruritic skin which I believe was exacerbated by extended sun exposure            Other    Hyperlipidemia     Hyperlipidemia  Patient will check lipid panel blood work and continue with current dose of statin therapy         Hyperglycemia     Hyperglycemia  Patient will check hemoglobin A1c and continue her dietary efforts to control blood sugars  Patient was also recommended to have a more aerobic form of exercise           Other Visit Diagnoses     Encounter for immunization    -  Primary    Relevant Orders    PNEUMOCOCCAL POLYSACCHARIDE VACCINE 23-VALENT =>1YO SQ IM (Completed)    Type 2 diabetes mellitus with complication, without long-term current use of insulin (HCC)        Relevant Orders    Comprehensive metabolic panel    Lipid panel    TSH, 3rd generation    Hemoglobin A1C        Patient did receive her initial Pneumovax 23 vaccine today  She will return to the office and received Prevnar 13 in 1 year  She will discuss with her allergist whether she should be receiving her shingles vaccine as well  Chief Complaint     Chief Complaint   Patient presents with    Follow-up     6 months       History of Present Illness     Patient continues to struggle with stressors involving her 's health  She does have to assist with his activities of daily living    Although she is active during the day she has not been having an aerobic type of exercise and has noticed some increase in weight  She feels increasing her exercise causes her to have get more hungry  Her last blood test was June 2019  She does see her allergist on a regular basis throughout the year to receive immunotherapy injections  Review of Systems   Review of Systems   Constitutional: Positive for unexpected weight change  HENT: Negative  Respiratory: Negative  Cardiovascular: Negative  Gastrointestinal: Negative  Genitourinary: Negative  Musculoskeletal: Negative  Skin:        Patient does complain of intermittent bilateral arm itching when exposed to sun for extended periods of time  She is scheduled see her dermatologist next month and will discuss at that office visit   Neurological: Negative  Psychiatric/Behavioral: Negative  Active Problem List     Patient Active Problem List   Diagnosis    Hyperlipidemia    Esophagitis, reflux    Hypothyroidism    EDEL (obstructive sleep apnea)    Vitamin D deficiency    Insufficient sleep syndrome    Chronic rhinitis    Mild intermittent asthma without complication    Gastroesophageal reflux disease without esophagitis    Obesity (BMI 30-39  9)    Palpitations    Hyperglycemia    Paresthesia    Pruritus       Past Medical History:  Past Medical History:   Diagnosis Date    Asthma     Glaucoma        Past Surgical History:  No past surgical history on file      Family History:  Family History   Problem Relation Age of Onset    Heart disease Mother     Other Maternal Grandmother         Thyroid disorder       Social History:  Social History     Socioeconomic History    Marital status: /Civil Union     Spouse name: Not on file    Number of children: Not on file    Years of education: Not on file    Highest education level: Not on file   Occupational History    Not on file   Social Needs    Financial resource strain: Not on file    Food insecurity:     Worry: Not on file     Inability: Not on file   Paola Bridges Transportation needs:     Medical: Not on file     Non-medical: Not on file   Tobacco Use    Smoking status: Never Smoker    Smokeless tobacco: Never Used   Substance and Sexual Activity    Alcohol use: Yes     Comment: social    Drug use: No    Sexual activity: Not on file   Lifestyle    Physical activity:     Days per week: Not on file     Minutes per session: Not on file    Stress: Not on file   Relationships    Social connections:     Talks on phone: Not on file     Gets together: Not on file     Attends Evangelical service: Not on file     Active member of club or organization: Not on file     Attends meetings of clubs or organizations: Not on file     Relationship status: Not on file    Intimate partner violence:     Fear of current or ex partner: Not on file     Emotionally abused: Not on file     Physically abused: Not on file     Forced sexual activity: Not on file   Other Topics Concern    Not on file   Social History Narrative    Not on file       Objective     Vitals:    11/20/19 1109   BP: 120/80   Pulse:    Resp:    Temp:    SpO2:      Wt Readings from Last 3 Encounters:   11/20/19 91 3 kg (201 lb 3 2 oz)   05/20/19 87 6 kg (193 lb 3 2 oz)   05/17/19 88 5 kg (195 lb)       Physical Exam   Constitutional: She is oriented to person, place, and time  No distress  Cardiovascular: Normal heart sounds  Regular rate and rhythm with no murmurs   Pulmonary/Chest: Breath sounds normal    Lungs are clear to auscultation without wheezes,rales, or rhonchi   Musculoskeletal: She exhibits no edema  Lymphadenopathy:     She has no cervical adenopathy  Neurological: She is alert and oriented to person, place, and time  No cranial nerve deficit  Psychiatric: She has a normal mood and affect   Her behavior is normal  Judgment and thought content normal        Pertinent Laboratory/Diagnostic Studies:  Lab Results   Component Value Date    GLUCOSE 106 02/27/2015    BUN 16 06/26/2019    CREATININE 0 83 06/26/2019    CALCIUM 9 0 06/26/2019     11/02/2017    K 4 4 06/26/2019    CO2 25 06/26/2019     06/26/2019     Lab Results   Component Value Date    ALT 28 06/26/2019    AST 24 06/26/2019    ALKPHOS 74 06/26/2019    BILITOT 0 6 11/02/2017       Lab Results   Component Value Date    WBC 3 9 06/26/2019    HGB 13 9 06/26/2019    HCT 41 9 06/26/2019    MCV 92 1 06/26/2019     06/26/2019       Lab Results   Component Value Date    TSH 2 04 06/26/2019       Lab Results   Component Value Date    CHOL 189 11/02/2017     Lab Results   Component Value Date    TRIG 172 (H) 06/26/2019     Lab Results   Component Value Date    HDL 43 (L) 06/26/2019     No results found for: [address]  Lab Results   Component Value Date    HGBA1C 6 1 (H) 06/26/2019       Results for orders placed or performed in visit on 06/26/19   Lipid panel   Result Value Ref Range    Total Cholesterol 198 <200 mg/dL    HDL 43 (L) >50 mg/dL    Triglycerides 172 (H) <150 mg/dL    LDL Direct 126 (H) mg/dL (calc)    Chol HDLC Ratio 4 6 <5 0 (calc)    Non-HDL Cholesterol 155 (H) <130 mg/dL (calc)   Comprehensive metabolic panel   Result Value Ref Range    Glucose, Random 95 65 - 99 mg/dL    BUN 16 7 - 25 mg/dL    Creatinine 0 83 0 50 - 0 99 mg/dL    eGFR Non  74 > OR = 60 mL/min/1 73m2    eGFR  86 > OR = 60 mL/min/1 73m2    SL AMB BUN/CREATININE RATIO NOT APPLICABLE 6 - 22 (calc)    Sodium 140 135 - 146 mmol/L    Potassium 4 4 3 5 - 5 3 mmol/L    Chloride 106 98 - 110 mmol/L    CO2 25 20 - 32 mmol/L    SL AMB CALCIUM 9 0 8 6 - 10 4 mg/dL    Protein, Total 7 6 6 1 - 8 1 g/dL    Albumin 4 2 3 6 - 5 1 g/dL    Globulin 3 4 1 9 - 3 7 g/dL (calc)    Albumin/Globulin Ratio 1 2 1 0 - 2 5 (calc)    TOTAL BILIRUBIN 0 4 0 2 - 1 2 mg/dL    Alkaline Phosphatase 74 33 - 130 U/L    AST 24 10 - 35 U/L    ALT 28 6 - 29 U/L   CBC and differential   Result Value Ref Range    White Blood Cell Count 3 9 3 8 - 10 8 Thousand/uL    Red Blood Cell Count 4 55 3 80 - 5 10 Million/uL    Hemoglobin 13 9 11 7 - 15 5 g/dL    HCT 41 9 35 0 - 45 0 %    MCV 92 1 80 0 - 100 0 fL    MCH 30 5 27 0 - 33 0 pg    MCHC 33 2 32 0 - 36 0 g/dL    RDW 12 8 11 0 - 15 0 %    Platelet Count 144 298 - 400 Thousand/uL    SL AMB MPV 9 8 7 5 - 12 5 fL    Neutrophils (Absolute) 2,145 1,500 - 7,800 cells/uL    Lymphocytes (Absolute) 1,307 850 - 3,900 cells/uL    Monocytes (Absolute) 257 200 - 950 cells/uL    Eosinophils (Absolute) 160 15 - 500 cells/uL    Basophils ABS 31 0 - 200 cells/uL    Neutrophils 55 %    Lymphocytes 33 5 %    Monocytes 6 6 %    Eosinophils 4 1 %    Basophils PCT 0 8 %   TSH, 3rd generation   Result Value Ref Range    TSH 2 04 0 40 - 4 50 mIU/L   Vitamin B12   Result Value Ref Range    Vitamin B-12 405 200 - 1,100 pg/mL   Vitamin D 25 hydroxy   Result Value Ref Range    Vitamin D, 25-Hydroxy, Serum 39 30 - 100 ng/mL   Hemoglobin A1c (w/out EAG)   Result Value Ref Range    Hemoglobin A1C 6 1 (H) <5 7 % of total Hgb       Orders Placed This Encounter   Procedures    PNEUMOCOCCAL POLYSACCHARIDE VACCINE 23-VALENT =>3YO SQ IM    Comprehensive metabolic panel    Lipid panel    TSH, 3rd generation    Hemoglobin A1C       ALLERGIES:  Allergies   Allergen Reactions    Bee Venom Anaphylaxis     Hornets, wasp, and bees    Codeine Nausea Only and Vomiting     Reaction Date: 24Aug2011;     Other      Narcotic ---nausea and vomiting     Oxycodone-Acetaminophen Nausea Only and Vomiting     Reaction Date: 24Aug2011;     Vicodin  [Hydrocodone-Acetaminophen] Nausea Only and Vomiting     Reaction Date: 24Aug2011;     Sulfa Antibiotics Rash       Current Medications     Current Outpatient Medications   Medication Sig Dispense Refill    budesonide-formoterol (SYMBICORT) 160-4 5 mcg/act inhaler Inhale daily       Cholecalciferol ([employer] VITAMIN D) 2000 units CAPS Take 1 capsule by mouth daily      co-enzyme Q-10 30 MG capsule Take 30 mg by mouth 3 (three) times a day      EPINEPHrine (EPIPEN 2-CHEIKH) 0 3 mg/0 3 mL SOAJ Inject 0 3 mL as directed      fluticasone (FLONASE) 50 mcg/act nasal spray 2 sprays into each nostril daily      levothyroxine 25 mcg tablet 1 tablet daily Mon through Fri  2 tabs daily Sat /Sun  116 tablet 1    loratadine (CLARITIN) 10 mg tablet Take 1 tablet by mouth daily      Multiple Vitamin (MULTI-VITAMIN DAILY PO) Take 1 tablet by mouth daily      Omega-3 Fatty Acids (EQL OMEGA 3 FISH OIL) 1000 MG CAPS Take 1 capsule by mouth daily      pantoprazole (PROTONIX) 40 mg tablet Take 1 tablet (40 mg total) by mouth daily 90 tablet 1    simvastatin (ZOCOR) 10 mg tablet TAKE 1 TABLET DAILY 90 tablet 1     No current facility-administered medications for this visit            Health Maintenance     Health Maintenance   Topic Date Due    Hepatitis C Screening  1955    HIV Screening  03/10/1969    BMI: Followup Plan  03/10/1972    Hepatitis B Vaccine (1 of 3 - Risk 3-dose series) 03/10/1973    Influenza Vaccine  07/01/2019    DTaP,Tdap,and Td Vaccines (1 - Tdap) 05/17/2020 (Originally 3/10/1965)    Fall Risk  05/20/2020    Depression Screening PHQ  05/20/2020    Urinary Incontinence Screening  05/20/2020    Medicare Annual Wellness Visit (AWV)  05/20/2020    BMI: Adult  11/20/2020    Pneumococcal Vaccine: 65+ Years (2 of 2 - PCV13) 11/20/2020    MAMMOGRAM  05/17/2021    CRC Screening: Colonoscopy  12/20/2023    Pneumococcal Vaccine: Pediatrics (0 to 5 Years) and At-Risk Patients (6 to 59 Years)  Aged Out    HIB Vaccine  Aged Out    IPV Vaccine  Aged Out    Hepatitis A Vaccine  Aged Out    Meningococcal ACWY Vaccine  Aged Out    HPV Vaccine  Aged [employer] History   Administered Date(s) Administered     Influenza (IM) Preservative Free 10/01/2018    H1N1, All Formulations 10/30/2009    INFLUENZA 10/01/2015, 10/04/2016    Influenza TIV (IM) 10/01/2008, 10/01/2016, 10/02/2017    Pneumococcal Polysaccharide PPV23 11/20/2019    Td (adult), adsorbed 06/01/1999    Zoster 04/30/2014, 05/09/2655       Domitila Baptiste MD  I spent 25 minutes with this patient which greater than 50% was spent counseling

## 2019-11-20 NOTE — ASSESSMENT & PLAN NOTE
Pruritus  Patient is going to be seeing her dermatologist next month    She will inquire as to her intermittent pruritic skin which I believe was exacerbated by extended sun exposure

## 2019-11-25 ENCOUNTER — OFFICE VISIT (OUTPATIENT)
Dept: SLEEP CENTER | Facility: CLINIC | Age: 64
End: 2019-11-25
Payer: MEDICARE

## 2019-11-25 VITALS
DIASTOLIC BLOOD PRESSURE: 84 MMHG | HEIGHT: 61 IN | HEART RATE: 74 BPM | SYSTOLIC BLOOD PRESSURE: 122 MMHG | WEIGHT: 200 LBS | BODY MASS INDEX: 37.76 KG/M2

## 2019-11-25 DIAGNOSIS — G47.33 OSA (OBSTRUCTIVE SLEEP APNEA): Primary | ICD-10-CM

## 2019-11-25 DIAGNOSIS — J31.0 CHRONIC RHINITIS: ICD-10-CM

## 2019-11-25 DIAGNOSIS — R00.2 PALPITATIONS: ICD-10-CM

## 2019-11-25 DIAGNOSIS — J45.20 MILD INTERMITTENT ASTHMA WITHOUT COMPLICATION: ICD-10-CM

## 2019-11-25 DIAGNOSIS — E66.9 OBESITY (BMI 30-39.9): ICD-10-CM

## 2019-11-25 DIAGNOSIS — K21.9 GASTROESOPHAGEAL REFLUX DISEASE WITHOUT ESOPHAGITIS: ICD-10-CM

## 2019-11-25 DIAGNOSIS — F51.12 INSUFFICIENT SLEEP SYNDROME: ICD-10-CM

## 2019-11-25 PROCEDURE — 99214 OFFICE O/P EST MOD 30 MIN: CPT | Performed by: INTERNAL MEDICINE

## 2019-11-25 NOTE — PROGRESS NOTES
Review of Systems      Genitourinary post menopausal (no peroids)   Cardiology none   Gastrointestinal none   Neurology numbness/tingling of an extremity   Constitutional none   Integumentary none   Psychiatry none   Musculoskeletal none   Pulmonary none   ENT none   Endocrine none   Hematological none

## 2019-11-25 NOTE — PROGRESS NOTES
Follow-Up Note - Nav 47  71 y o  female  CHERIE:[birth year]  [facility]:[id number]    CC: I saw this patient for follow-up in clinic today for her Sleep Disordered Breathing, Coexisting Sleep and Medical Problems  PFSH, Problem List, Medications & Allergies were reviewed in EMR  Interval changes: none reported  She  has a past medical history of Asthma and Glaucoma  She has a current medication list which includes the following prescription(s): budesonide-formoterol, cholecalciferol, co-enzyme q-10, epinephrine, fluticasone, levothyroxine, loratadine, multiple vitamin, eql omega 3 fish oil, pantoprazole, and simvastatin  ROS: Reviewed (see attached)  Significant for allergies, asthma and acid reflux controlled  Reports episodic palpitations that she feels stress related  [place] Mary DATA REVIEWED:  using PAP > 4 hours/night 100% of the time  Estimated PARAG 0 9/hour at pressure of 12 4cm H2O @90th percentile  SUBJECTIVE: Regarding use of PAP, Cynthia reports:   · She is experiencing some adverse effects: mask leaks  and mask causes redness / facial marks   · She is   benefiting from use: sleeping better   Sleep Routine: She reports getting 6-7 hrs sleep  ; she has no difficulty initiating or maintaining sleep   She awakens with the aid of an alarm and feels refreshed  She denied excessive drowsiness   She rated herself at Total score: 6 /24 on the Cutler sleepiness scale  Habits: reports that she has never smoked  She has never used smokeless tobacco ,  reports that she drinks alcohol ,  reports that she does not use drugs  , Caffeine use: none , Exercise routine: sometimes   OBJECTIVE: /84   Pulse 74   Ht 5' 1" (1 549 m)   Wt 90 7 kg (200 lb)   BMI 37 79 kg/m²    Constitutional: Patient is well groomed; well appearing  Skin/Extrem: warm & dry; col & hydration normal; no edema  Psych: cooperativeand in no distress   Mental State appears normal   CNS: Alert, orientated, clear & coherent speech  H&N: EOMI; NC/AT:no facial pressure marks, no rashes  Neck Circumference: 16"  ENMT Mucus membranes normal Nasal airway:patent  Oral airway: crowded  Resp:effort is normal CVS: RRR ABD:truncal obesity MSK:Gait normal     ASSESSMENT: Primary Sleep/Secondary(to Medical or Psych conditions) & comorbidities   1  EDEL (obstructive sleep apnea)  PAP DME Resupply/Reorder   2  Insufficient sleep syndrome     3  Chronic rhinitis     4  Mild intermittent asthma without complication     5  Palpitations     6  Gastroesophageal reflux disease without esophagitis     7  Obesity (BMI 30-39  9)       PLAN:  1  Treatment with  PAP is medically necessary and Neville Harris is agreable to continue use  2  Care of equipment, methods to improve comfort using PAP and importance of compliance with therapy were discussed  3  Pressure setting: continue 11-15 cmH2O     4  Rx provided to replace supplies and Care coordinated with DME provider  5  Strategies for weight reduction were discussed  6  I also advised allowing sufficient opportunity for sleep  7  Follow-up is advised in 1 year or sooner if needed to monitor progress, compliance and to adjust therapy  Thank you for allowing me to participate in the care of this patient      Sincerely,    Authenticated electronically by Kadi Spence MD on 33/09/80   Board Certified Specialist

## 2019-11-26 ENCOUNTER — TELEPHONE (OUTPATIENT)
Dept: SLEEP CENTER | Facility: CLINIC | Age: 64
End: 2019-11-26

## 2019-12-02 ENCOUNTER — TELEPHONE (OUTPATIENT)
Dept: SLEEP CENTER | Facility: CLINIC | Age: 64
End: 2019-12-02

## 2019-12-02 NOTE — TELEPHONE ENCOUNTER
Robby Rogers from [facility] called, states patient brought in script for DME supplies  They are looking for sleep study results and office notes prior to study and recent  Advised to fax to [phone]  Left message for patient to call office to verify which DME provider she is using    Chart states she is using [facility]

## 2019-12-05 ENCOUNTER — APPOINTMENT (OUTPATIENT)
Dept: LAB | Facility: CLINIC | Age: 64
End: 2019-12-05
Payer: MEDICARE

## 2019-12-05 ENCOUNTER — TRANSCRIBE ORDERS (OUTPATIENT)
Dept: LAB | Facility: CLINIC | Age: 64
End: 2019-12-05

## 2019-12-05 DIAGNOSIS — R20.2 PARESTHESIA: ICD-10-CM

## 2019-12-05 DIAGNOSIS — E55.9 VITAMIN D DEFICIENCY: ICD-10-CM

## 2019-12-05 DIAGNOSIS — E11.8 TYPE 2 DIABETES MELLITUS WITH COMPLICATION, WITHOUT LONG-TERM CURRENT USE OF INSULIN (HCC): ICD-10-CM

## 2019-12-05 LAB
25(OH)D3 SERPL-MCNC: 43.2 NG/ML (ref 30–100)
ALBUMIN SERPL BCP-MCNC: 3.8 G/DL (ref 3.5–5)
ALP SERPL-CCNC: 77 U/L (ref 46–116)
ALT SERPL W P-5'-P-CCNC: 53 U/L (ref 12–78)
ANION GAP SERPL CALCULATED.3IONS-SCNC: 4 MMOL/L (ref 4–13)
AST SERPL W P-5'-P-CCNC: 34 U/L (ref 5–45)
BILIRUB SERPL-MCNC: 0.51 MG/DL (ref 0.2–1)
BUN SERPL-MCNC: 16 MG/DL (ref 5–25)
CALCIUM SERPL-MCNC: 9.2 MG/DL (ref 8.3–10.1)
CHLORIDE SERPL-SCNC: 108 MMOL/L (ref 100–108)
CHOLEST SERPL-MCNC: 206 MG/DL (ref 50–200)
CO2 SERPL-SCNC: 26 MMOL/L (ref 21–32)
CREAT SERPL-MCNC: 0.84 MG/DL (ref 0.6–1.3)
ERYTHROCYTE [DISTWIDTH] IN BLOOD BY AUTOMATED COUNT: 13.1 % (ref 11.6–15.1)
EST. AVERAGE GLUCOSE BLD GHB EST-MCNC: 134 MG/DL
GFR SERPL CREATININE-BSD FRML MDRD: 73 ML/MIN/1.73SQ M
GLUCOSE P FAST SERPL-MCNC: 103 MG/DL (ref 65–99)
HBA1C MFR BLD: 6.3 % (ref 4.2–6.3)
HCT VFR BLD AUTO: 45.7 % (ref 34.8–46.1)
HDLC SERPL-MCNC: 38 MG/DL
HGB BLD-MCNC: 14.9 G/DL (ref 11.5–15.4)
LDLC SERPL CALC-MCNC: 122 MG/DL (ref 0–100)
MCH RBC QN AUTO: 30.5 PG (ref 26.8–34.3)
MCHC RBC AUTO-ENTMCNC: 32.6 G/DL (ref 31.4–37.4)
MCV RBC AUTO: 94 FL (ref 82–98)
NONHDLC SERPL-MCNC: 168 MG/DL
PLATELET # BLD AUTO: 260 THOUSANDS/UL (ref 149–390)
PMV BLD AUTO: 9.5 FL (ref 8.9–12.7)
POTASSIUM SERPL-SCNC: 4.1 MMOL/L (ref 3.5–5.3)
PROT SERPL-MCNC: 8.4 G/DL (ref 6.4–8.2)
RBC # BLD AUTO: 4.88 MILLION/UL (ref 3.81–5.12)
SODIUM SERPL-SCNC: 138 MMOL/L (ref 136–145)
TRIGL SERPL-MCNC: 228 MG/DL
TSH SERPL DL<=0.05 MIU/L-ACNC: 3.08 UIU/ML (ref 0.36–3.74)
VIT B12 SERPL-MCNC: 528 PG/ML (ref 100–900)
WBC # BLD AUTO: 5.18 THOUSAND/UL (ref 4.31–10.16)

## 2019-12-05 PROCEDURE — 36415 COLL VENOUS BLD VENIPUNCTURE: CPT

## 2019-12-05 PROCEDURE — 80061 LIPID PANEL: CPT

## 2019-12-05 PROCEDURE — 85027 COMPLETE CBC AUTOMATED: CPT

## 2019-12-05 PROCEDURE — 82607 VITAMIN B-12: CPT

## 2019-12-05 PROCEDURE — 82306 VITAMIN D 25 HYDROXY: CPT

## 2019-12-05 PROCEDURE — 84443 ASSAY THYROID STIM HORMONE: CPT

## 2019-12-05 PROCEDURE — 80053 COMPREHEN METABOLIC PANEL: CPT

## 2019-12-05 PROCEDURE — 83036 HEMOGLOBIN GLYCOSYLATED A1C: CPT

## 2020-01-09 ENCOUNTER — TELEPHONE (OUTPATIENT)
Dept: FAMILY MEDICINE CLINIC | Facility: CLINIC | Age: 65
End: 2020-01-09

## 2020-01-09 ENCOUNTER — TRANSCRIBE ORDERS (OUTPATIENT)
Dept: LAB | Facility: CLINIC | Age: 65
End: 2020-01-09

## 2020-01-09 NOTE — TELEPHONE ENCOUNTER
Patient called and stated that she is supposed to have her lipids checked again this month but I do not see any scripts  Would you be able to add in script so she can have it done?   Please call to advise

## 2020-01-29 ENCOUNTER — APPOINTMENT (OUTPATIENT)
Dept: LAB | Facility: CLINIC | Age: 65
End: 2020-01-29
Payer: MEDICARE

## 2020-01-29 DIAGNOSIS — E78.5 HYPERLIPIDEMIA, UNSPECIFIED HYPERLIPIDEMIA TYPE: Primary | ICD-10-CM

## 2020-01-29 DIAGNOSIS — E78.5 HYPERLIPIDEMIA, UNSPECIFIED HYPERLIPIDEMIA TYPE: ICD-10-CM

## 2020-01-29 LAB
CHOLEST SERPL-MCNC: 194 MG/DL (ref 50–200)
HDLC SERPL-MCNC: 43 MG/DL
LDLC SERPL CALC-MCNC: 120 MG/DL (ref 0–100)
NONHDLC SERPL-MCNC: 151 MG/DL
TRIGL SERPL-MCNC: 157 MG/DL

## 2020-01-29 PROCEDURE — 36415 COLL VENOUS BLD VENIPUNCTURE: CPT

## 2020-01-29 PROCEDURE — 80061 LIPID PANEL: CPT

## 2020-01-30 ENCOUNTER — TELEPHONE (OUTPATIENT)
Dept: FAMILY MEDICINE CLINIC | Facility: CLINIC | Age: 65
End: 2020-01-30

## 2020-01-30 NOTE — TELEPHONE ENCOUNTER
----- Message from Tiana Rosario MD sent at 7/77/0642  6:50 AM EST -----  Recent cholesterol blood work showed decrease in total cholesterol from 206 to currently at 194 and decreased TG's from 228 to 157    Continue current regimen of medications

## 2020-02-18 ENCOUNTER — TELEPHONE (OUTPATIENT)
Dept: FAMILY MEDICINE CLINIC | Facility: CLINIC | Age: 65
End: 2020-02-18

## 2020-02-18 DIAGNOSIS — E78.5 HYPERLIPIDEMIA, UNSPECIFIED HYPERLIPIDEMIA TYPE: ICD-10-CM

## 2020-02-18 RX ORDER — SIMVASTATIN 20 MG
20 TABLET ORAL DAILY
Qty: 90 TABLET | Refills: 3 | Status: SHIPPED | OUTPATIENT
Start: 2020-02-18 | End: 2020-12-21

## 2020-02-18 NOTE — TELEPHONE ENCOUNTER
Patient needs a new RX for her Simvastatin, she stated it changed from 10mg  To 20mg  If you would please correct in Epic and send a new RX to [facility] for a 90 day supply

## 2020-04-06 DIAGNOSIS — K21.9 GASTROESOPHAGEAL REFLUX DISEASE WITHOUT ESOPHAGITIS: ICD-10-CM

## 2020-04-06 RX ORDER — PANTOPRAZOLE SODIUM 40 MG/1
TABLET, DELAYED RELEASE ORAL
Qty: 90 TABLET | Refills: 1 | Status: SHIPPED | OUTPATIENT
Start: 2020-04-06 | End: 2020-10-12

## 2020-04-27 DIAGNOSIS — E03.9 HYPOTHYROIDISM, UNSPECIFIED TYPE: ICD-10-CM

## 2020-04-27 RX ORDER — LEVOTHYROXINE SODIUM 0.03 MG/1
TABLET ORAL
Qty: 116 TABLET | Refills: 1 | Status: SHIPPED | OUTPATIENT
Start: 2020-04-27 | End: 2020-10-12

## 2020-05-13 ENCOUNTER — TELEMEDICINE (OUTPATIENT)
Dept: FAMILY MEDICINE CLINIC | Facility: CLINIC | Age: 65
End: 2020-05-13
Payer: MEDICARE

## 2020-05-13 VITALS
WEIGHT: 193 LBS | SYSTOLIC BLOOD PRESSURE: 124 MMHG | TEMPERATURE: 97.7 F | HEIGHT: 61 IN | HEART RATE: 70 BPM | DIASTOLIC BLOOD PRESSURE: 76 MMHG | BODY MASS INDEX: 36.44 KG/M2
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
## 2020-05-13 PROCEDURE — 99213 OFFICE O/P EST LOW 20 MIN: CPT | Performed by: NURSE PRACTITIONER

## 2020-05-21 ENCOUNTER — TELEMEDICINE (OUTPATIENT)
Dept: FAMILY MEDICINE CLINIC | Facility: CLINIC | Age: 65
End: 2020-05-21
Payer: MEDICARE

## 2020-05-21 VITALS
BODY MASS INDEX: 36.44 KG/M2 | WEIGHT: 193 LBS | TEMPERATURE: 97.3 F | HEIGHT: 61 IN | DIASTOLIC BLOOD PRESSURE: 77 MMHG | SYSTOLIC BLOOD PRESSURE: 126 MMHG | HEART RATE: 67 BPM

## 2020-05-21 DIAGNOSIS — R73.9 HYPERGLYCEMIA: ICD-10-CM

## 2020-05-21 DIAGNOSIS — E03.9 HYPOTHYROIDISM, UNSPECIFIED TYPE: ICD-10-CM

## 2020-05-21 DIAGNOSIS — E78.5 HYPERLIPIDEMIA, UNSPECIFIED HYPERLIPIDEMIA TYPE: ICD-10-CM

## 2020-05-21 DIAGNOSIS — E11.8 TYPE 2 DIABETES MELLITUS WITH COMPLICATION, WITHOUT LONG-TERM CURRENT USE OF INSULIN (HCC): Primary | ICD-10-CM

## 2020-05-21 PROCEDURE — 1123F ACP DISCUSS/DSCN MKR DOCD: CPT | Performed by: FAMILY MEDICINE

## 2020-05-21 PROCEDURE — 3008F BODY MASS INDEX DOCD: CPT | Performed by: FAMILY MEDICINE

## 2020-05-21 PROCEDURE — 1036F TOBACCO NON-USER: CPT | Performed by: FAMILY MEDICINE

## 2020-05-21 PROCEDURE — 4040F PNEUMOC VAC/ADMIN/RCVD: CPT | Performed by: FAMILY MEDICINE

## 2020-05-21 PROCEDURE — G0438 PPPS, INITIAL VISIT: HCPCS | Performed by: FAMILY MEDICINE

## 2020-05-21 PROCEDURE — 1160F RVW MEDS BY RX/DR IN RCRD: CPT | Performed by: FAMILY MEDICINE

## 2020-05-21 PROCEDURE — 1125F AMNT PAIN NOTED PAIN PRSNT: CPT | Performed by: FAMILY MEDICINE

## 2020-05-21 PROCEDURE — 1170F FXNL STATUS ASSESSED: CPT | Performed by: FAMILY MEDICINE

## 2020-05-21 PROCEDURE — 99213 OFFICE O/P EST LOW 20 MIN: CPT | Performed by: FAMILY MEDICINE

## 2020-05-22 ENCOUNTER — HOSPITAL ENCOUNTER (OUTPATIENT)
Dept: MAMMOGRAPHY | Facility: HOSPITAL | Age: 65
Discharge: HOME | End: 2020-05-22
Payer: MEDICARE

## 2020-05-22 VITALS — BODY MASS INDEX: 36.44 KG/M2 | WEIGHT: 193 LBS | HEIGHT: 61 IN

## 2020-05-22 DIAGNOSIS — Z12.31 VISIT FOR SCREENING MAMMOGRAM: ICD-10-CM

## 2020-05-22 PROCEDURE — 77063 BREAST TOMOSYNTHESIS BI: CPT

## 2020-05-22 PROCEDURE — 77067 SCR MAMMO BI INCL CAD: CPT

## 2020-06-01 ENCOUNTER — OFFICE VISIT (OUTPATIENT)
Dept: FAMILY MEDICINE CLINIC | Facility: CLINIC | Age: 65
End: 2020-06-01
Payer: MEDICARE

## 2020-06-01 ENCOUNTER — LAB (OUTPATIENT)
Dept: LAB | Facility: CLINIC | Age: 65
End: 2020-06-01
Payer: MEDICARE

## 2020-06-01 VITALS
TEMPERATURE: 96.8 F | DIASTOLIC BLOOD PRESSURE: 88 MMHG | WEIGHT: 195.8 LBS | BODY MASS INDEX: 36.97 KG/M2 | RESPIRATION RATE: 15 BRPM | HEIGHT: 61 IN | SYSTOLIC BLOOD PRESSURE: 130 MMHG | HEART RATE: 69 BPM

## 2020-06-01 DIAGNOSIS — E03.9 HYPOTHYROIDISM, UNSPECIFIED TYPE: ICD-10-CM

## 2020-06-01 DIAGNOSIS — Z01.818 PREOPERATIVE EVALUATION OF A MEDICAL CONDITION TO RULE OUT SURGICAL CONTRAINDICATIONS (TAR REQUIRED): Primary | ICD-10-CM

## 2020-06-01 DIAGNOSIS — E11.8 TYPE 2 DIABETES MELLITUS WITH COMPLICATION, WITHOUT LONG-TERM CURRENT USE OF INSULIN (HCC): ICD-10-CM

## 2020-06-01 LAB
ALBUMIN SERPL BCP-MCNC: 3.8 G/DL (ref 3.5–5)
ALP SERPL-CCNC: 81 U/L (ref 46–116)
ALT SERPL W P-5'-P-CCNC: 46 U/L (ref 12–78)
ANION GAP SERPL CALCULATED.3IONS-SCNC: 6 MMOL/L (ref 4–13)
AST SERPL W P-5'-P-CCNC: 28 U/L (ref 5–45)
BILIRUB SERPL-MCNC: 0.51 MG/DL (ref 0.2–1)
BUN SERPL-MCNC: 13 MG/DL (ref 5–25)
CALCIUM SERPL-MCNC: 8.9 MG/DL (ref 8.3–10.1)
CHLORIDE SERPL-SCNC: 105 MMOL/L (ref 100–108)
CO2 SERPL-SCNC: 26 MMOL/L (ref 21–32)
CREAT SERPL-MCNC: 0.74 MG/DL (ref 0.6–1.3)
ERYTHROCYTE [DISTWIDTH] IN BLOOD BY AUTOMATED COUNT: 13.2 % (ref 11.6–15.1)
EST. AVERAGE GLUCOSE BLD GHB EST-MCNC: 123 MG/DL
GFR SERPL CREATININE-BSD FRML MDRD: 85 ML/MIN/1.73SQ M
GLUCOSE P FAST SERPL-MCNC: 92 MG/DL (ref 65–99)
HBA1C MFR BLD: 5.9 %
HCT VFR BLD AUTO: 44.1 % (ref 34.8–46.1)
HGB BLD-MCNC: 14 G/DL (ref 11.5–15.4)
MCH RBC QN AUTO: 30.5 PG (ref 26.8–34.3)
MCHC RBC AUTO-ENTMCNC: 31.7 G/DL (ref 31.4–37.4)
MCV RBC AUTO: 96 FL (ref 82–98)
PLATELET # BLD AUTO: 236 THOUSANDS/UL (ref 149–390)
PMV BLD AUTO: 9.5 FL (ref 8.9–12.7)
POTASSIUM SERPL-SCNC: 4 MMOL/L (ref 3.5–5.3)
PROT SERPL-MCNC: 8 G/DL (ref 6.4–8.2)
RBC # BLD AUTO: 4.59 MILLION/UL (ref 3.81–5.12)
SODIUM SERPL-SCNC: 137 MMOL/L (ref 136–145)
TSH SERPL DL<=0.05 MIU/L-ACNC: 2.98 UIU/ML (ref 0.36–3.74)
WBC # BLD AUTO: 5.13 THOUSAND/UL (ref 4.31–10.16)

## 2020-06-01 PROCEDURE — 84443 ASSAY THYROID STIM HORMONE: CPT

## 2020-06-01 PROCEDURE — 99214 OFFICE O/P EST MOD 30 MIN: CPT | Performed by: FAMILY MEDICINE

## 2020-06-01 PROCEDURE — 36415 COLL VENOUS BLD VENIPUNCTURE: CPT

## 2020-06-01 PROCEDURE — 80053 COMPREHEN METABOLIC PANEL: CPT

## 2020-06-01 PROCEDURE — 83036 HEMOGLOBIN GLYCOSYLATED A1C: CPT

## 2020-06-01 PROCEDURE — 85027 COMPLETE CBC AUTOMATED: CPT

## 2020-06-15 ENCOUNTER — TRANSCRIBE ORDERS (OUTPATIENT)
Dept: ADMINISTRATIVE | Facility: HOSPITAL | Age: 65
End: 2020-06-15

## 2020-06-15 DIAGNOSIS — Z12.31 SCREENING MAMMOGRAM, ENCOUNTER FOR: Primary | ICD-10-CM

## 2020-08-27 ENCOUNTER — TELEPHONE (OUTPATIENT)
Dept: FAMILY MEDICINE CLINIC | Facility: CLINIC | Age: 65
End: 2020-08-27

## 2020-08-27 NOTE — TELEPHONE ENCOUNTER
Patient called stating she is going on vacation 09/05/20 and she would like to get her pneumonia vaccination and flu vaccination before she leaves  Is it okay to schedule the pneumonia vaccination with a nurse and is she able to get the flu vaccination at the same time  Please advise patient at [phone]

## 2020-09-04 ENCOUNTER — CLINICAL SUPPORT (OUTPATIENT)
Dept: FAMILY MEDICINE CLINIC | Facility: CLINIC | Age: 65
End: 2020-09-04
Payer: MEDICARE

## 2020-09-04 VITALS — TEMPERATURE: 98 F

## 2020-09-04 DIAGNOSIS — Z23 FLU VACCINE NEED: Primary | ICD-10-CM

## 2020-09-04 DIAGNOSIS — Z23 ENCOUNTER FOR IMMUNIZATION: ICD-10-CM

## 2020-09-04 PROCEDURE — 90670 PCV13 VACCINE IM: CPT

## 2020-09-04 PROCEDURE — G0009 ADMIN PNEUMOCOCCAL VACCINE: HCPCS

## 2020-09-04 PROCEDURE — 90662 IIV NO PRSV INCREASED AG IM: CPT

## 2020-09-04 PROCEDURE — G0008 ADMIN INFLUENZA VIRUS VAC: HCPCS

## 2020-10-12 DIAGNOSIS — E03.9 HYPOTHYROIDISM, UNSPECIFIED TYPE: ICD-10-CM

## 2020-10-12 DIAGNOSIS — K21.9 GASTROESOPHAGEAL REFLUX DISEASE WITHOUT ESOPHAGITIS: ICD-10-CM

## 2020-10-12 RX ORDER — PANTOPRAZOLE SODIUM 40 MG/1
TABLET, DELAYED RELEASE ORAL
Qty: 90 TABLET | Refills: 1 | Status: SHIPPED | OUTPATIENT
Start: 2020-10-12 | End: 2021-02-08

## 2020-10-12 RX ORDER — LEVOTHYROXINE SODIUM 0.03 MG/1
TABLET ORAL
Qty: 116 TABLET | Refills: 1 | Status: SHIPPED | OUTPATIENT
Start: 2020-10-12 | End: 2021-03-26

## 2020-10-20 RX ORDER — KETOROLAC TROMETHAMINE 5 MG/ML
SOLUTION OPHTHALMIC
COMMUNITY
Start: 2020-08-12 | End: 2021-01-15 | Stop reason: ALTCHOICE

## 2020-10-21 ENCOUNTER — OFFICE VISIT (OUTPATIENT)
Dept: FAMILY MEDICINE CLINIC | Facility: CLINIC | Age: 65
End: 2020-10-21
Payer: MEDICARE

## 2020-10-21 VITALS
HEART RATE: 77 BPM | OXYGEN SATURATION: 94 % | WEIGHT: 195.2 LBS | TEMPERATURE: 97.8 F | RESPIRATION RATE: 15 BRPM | BODY MASS INDEX: 36.85 KG/M2 | DIASTOLIC BLOOD PRESSURE: 70 MMHG | HEIGHT: 61 IN | SYSTOLIC BLOOD PRESSURE: 130 MMHG

## 2020-10-21 DIAGNOSIS — Z01.818 PREOPERATIVE EVALUATION OF A MEDICAL CONDITION TO RULE OUT SURGICAL CONTRAINDICATIONS (TAR REQUIRED): Primary | ICD-10-CM

## 2020-10-21 PROCEDURE — 99213 OFFICE O/P EST LOW 20 MIN: CPT | Performed by: FAMILY MEDICINE

## 2020-10-21 RX ORDER — BIOTIN 1 MG
1000 TABLET ORAL DAILY
COMMUNITY

## 2020-11-30 ENCOUNTER — OFFICE VISIT (OUTPATIENT)
Dept: SLEEP CENTER | Facility: CLINIC | Age: 65
End: 2020-11-30
Payer: MEDICARE

## 2020-11-30 VITALS
HEIGHT: 61 IN | OXYGEN SATURATION: 98 % | WEIGHT: 197.4 LBS | BODY MASS INDEX: 37.27 KG/M2 | DIASTOLIC BLOOD PRESSURE: 68 MMHG | SYSTOLIC BLOOD PRESSURE: 120 MMHG

## 2020-11-30 DIAGNOSIS — K21.9 GASTROESOPHAGEAL REFLUX DISEASE WITHOUT ESOPHAGITIS: ICD-10-CM

## 2020-11-30 DIAGNOSIS — E66.9 OBESITY (BMI 30-39.9): ICD-10-CM

## 2020-11-30 DIAGNOSIS — J45.20 MILD INTERMITTENT ASTHMA WITHOUT COMPLICATION: ICD-10-CM

## 2020-11-30 DIAGNOSIS — G47.33 OSA (OBSTRUCTIVE SLEEP APNEA): Primary | ICD-10-CM

## 2020-11-30 DIAGNOSIS — F51.12 INSUFFICIENT SLEEP SYNDROME: ICD-10-CM

## 2020-11-30 DIAGNOSIS — J31.0 CHRONIC RHINITIS: ICD-10-CM

## 2020-11-30 PROCEDURE — 99214 OFFICE O/P EST MOD 30 MIN: CPT | Performed by: INTERNAL MEDICINE

## 2020-12-01 ENCOUNTER — TELEPHONE (OUTPATIENT)
Dept: SLEEP CENTER | Facility: CLINIC | Age: 65
End: 2020-12-01

## 2020-12-21 DIAGNOSIS — E78.5 HYPERLIPIDEMIA, UNSPECIFIED HYPERLIPIDEMIA TYPE: ICD-10-CM

## 2020-12-21 RX ORDER — SIMVASTATIN 20 MG
TABLET ORAL
Qty: 90 TABLET | Refills: 3 | Status: SHIPPED | OUTPATIENT
Start: 2020-12-21 | End: 2021-02-08 | Stop reason: SDUPTHER

## 2021-01-15 ENCOUNTER — OFFICE VISIT (OUTPATIENT)
Dept: FAMILY MEDICINE CLINIC | Facility: CLINIC | Age: 66
End: 2021-01-15
Payer: MEDICARE

## 2021-01-15 VITALS
OXYGEN SATURATION: 97 % | SYSTOLIC BLOOD PRESSURE: 120 MMHG | TEMPERATURE: 97.1 F | HEIGHT: 61 IN | WEIGHT: 198 LBS | HEART RATE: 71 BPM | DIASTOLIC BLOOD PRESSURE: 80 MMHG | BODY MASS INDEX: 37.38 KG/M2 | RESPIRATION RATE: 16 BRPM

## 2021-01-15 DIAGNOSIS — M25.512 ACUTE PAIN OF LEFT SHOULDER: Primary | ICD-10-CM

## 2021-01-15 PROCEDURE — 99213 OFFICE O/P EST LOW 20 MIN: CPT | Performed by: NURSE PRACTITIONER

## 2021-01-15 NOTE — PROGRESS NOTES
FAMILY PRACTICE OFFICE VISIT       NAME: Sapphire Reyes  AGE: 76 y o  SEX: female       : 1955        MRN: [id number]    Assessment and Plan     Problem List Items Addressed This Visit     None      Visit Diagnoses     Acute pain of left shoulder    -  Primary    Relevant Orders    Ambulatory referral to Orthopedic Surgery          1  Acute pain of left shoulder  Ambulatory referral to Orthopedic Surgery     This 11-year-old female presents today with left shoulder pain that began 2 days ago  No known injuries  She was doing a lot of lifting earlier this week  On exam she has reduced flexion and extension of shoulder  There is point tenderness on superior, anterior shoulder  Positive empty can test   She has reduced strength to resistance with shoulder abduction  Suspect rotator cuff tendinopathy  Continue ibuprofen every 8 hours with food  Recommend follow-up with Orthopedics, Dr Marcie Lui  Chief Complaint     Chief Complaint   Patient presents with    Shoulder Pain     Pt is here for left shoulder pain 2 + days       History of Present Illness     Ben Roach is a 11-year-old female presenting today for left shoulder pain  She woke up 2 mornings ago with shoulder pain  Pain was the worst 2 nights ago, has become more tolerable since then  Pain is not worse at night  Pain is exacerbated by lifting her arm higher than 90  Pain is exacerbated by lifting anything with some weight, such as her purse  Putting on her jacket is difficult  She has been taking over-the-counter ibuprofen every 8 hours, which is helpful in reducing pain  No known injury  She was doing a lot of lifting earlier in the week, Shonda ornaments, Shonda tree, a microwave, but did not think this activity was out of the ordinary  States she had similar pain in  and saw Dr Marcie Lui  States she was treated with a cortisone injection which was effective  She has not tried to apply heat or ice  Dominant hand: Ambidextrous                  Review of Systems   Review of Systems   Constitutional: Negative  Musculoskeletal: Positive for arthralgias ( left shoulder as noted in HPI)  Active Problem List     Patient Active Problem List   Diagnosis    Hyperlipidemia    Esophagitis, reflux    Hypothyroidism    EDEL (obstructive sleep apnea)    Vitamin D deficiency    Insufficient sleep syndrome    Chronic rhinitis    Mild intermittent asthma without complication    Gastroesophageal reflux disease without esophagitis    Obesity (BMI 30-39  9)    Palpitations    Hyperglycemia    Paresthesia    Pruritus    Preoperative evaluation of a medical condition to rule out surgical contraindications (TAR required)       Past Medical History:  Past Medical History:   Diagnosis Date    Asthma     Glaucoma        Past Surgical History:  Past Surgical History:   Procedure Laterality Date    EYE SURGERY Left 06/08/2020     macular gap - 06/08/2020 & 11/02/2020       Family History:  Family History   Problem Relation Age of Onset    Heart disease Mother     Other Maternal Grandmother         Thyroid disorder    No Known Problems Maternal Grandfather     No Known Problems Paternal Grandmother     No Known Problems Paternal Grandfather     No Known Problems Father     No Known Problems Son     No Known Problems Son     No Known Problems Son     No Known Problems Brother     No Known Problems Brother        Social History:  Social History     Socioeconomic History    Marital status: /Civil Union     Spouse name: Not on file    Number of children: Not on file    Years of education: Not on file    Highest education level: Not on file   Occupational History    Not on file   Social Needs    Financial resource strain: Not on file    Food insecurity     Worry: Not on file     Inability: Not on file    Transportation needs     Medical: Not on file     Non-medical: Not on file   Tobacco Use    Smoking status: Never Smoker    Smokeless tobacco: Never Used   Substance and Sexual Activity    Alcohol use: Yes     Comment: social    Drug use: No    Sexual activity: Not Currently   Lifestyle    Physical activity     Days per week: Not on file     Minutes per session: Not on file    Stress: Not on file   Relationships    Social connections     Talks on phone: Not on file     Gets together: Not on file     Attends Roman Catholic service: Not on file     Active member of club or organization: Not on file     Attends meetings of clubs or organizations: Not on file     Relationship status: Not on file    Intimate partner violence     Fear of current or ex partner: Not on file     Emotionally abused: Not on file     Physically abused: Not on file     Forced sexual activity: Not on file   Other Topics Concern    Not on file   Social History Narrative    Not on file       I have reviewed the patient's medical history in detail; there are no changes to the history as noted in the electronic medical record  Objective     Vitals:    01/15/21 1332   BP: 120/80   Pulse: 71   Resp: 16   Temp: (!) 97 1 °F (36 2 °C)   TempSrc: Temporal   SpO2: 97%   Weight: 89 8 kg (198 lb)   Height: 5' 1" (1 549 m)     Wt Readings from Last 3 Encounters:   01/15/21 89 8 kg (198 lb)   11/30/20 89 5 kg (197 lb 6 4 oz)   10/21/20 88 5 kg (195 lb 3 2 oz)     Physical Exam  Vitals signs and nursing note reviewed  Constitutional:       General: She is not in acute distress  Appearance: Normal appearance  She is not ill-appearing, toxic-appearing or diaphoretic  Cardiovascular:      Rate and Rhythm: Normal rate and regular rhythm  Pulses:           Radial pulses are 2+ on the right side and 2+ on the left side  Heart sounds: No murmur  Pulmonary:      Effort: Pulmonary effort is normal  No respiratory distress  Breath sounds: Normal breath sounds  No wheezing or rales     Musculoskeletal:      Left shoulder: She exhibits decreased range of motion (unable to raise arm higher than 90 degrees  reduced shoulder extension), tenderness (point tenderness, anterior, superior shoulder) and decreased strength  She exhibits no swelling, no effusion, no crepitus and no deformity  Comments:   Positive left empty can test    Skin:     Capillary Refill: Capillary refill takes less than 2 seconds  Neurological:      Mental Status: She is alert and oriented to person, place, and time  Sensory: Sensation is intact  Comments: Left shoulder with reduced strength against resistance with horizontal abduction     Psychiatric:         Mood and Affect: Mood normal          Behavior: Behavior normal             ALLERGIES:  Allergies   Allergen Reactions    Bee Venom Anaphylaxis     Hornets, wasp, and bees    Codeine Nausea Only and Vomiting     Reaction Date: 24Aug2011;     Other      Narcotic ---nausea and vomiting     Oxycodone-Acetaminophen Nausea Only and Vomiting     Reaction Date: 24Aug2011;     Vicodin  [Hydrocodone-Acetaminophen] Nausea Only and Vomiting     Reaction Date: 24Aug2011;     Sulfa Antibiotics Rash       Current Medications     Current Outpatient Medications   Medication Sig Dispense Refill    Biotin 1000 MCG tablet Take 1,000 mcg by mouth daily      budesonide-formoterol (SYMBICORT) 160-4 5 mcg/act inhaler Inhale daily       Cholecalciferol ([employer] VITAMIN D) 2000 units CAPS Take 1 capsule by mouth daily      co-enzyme Q-10 30 MG capsule Take 30 mg by mouth 3 (three) times a day      EPINEPHrine (EPIPEN 2-CHEIKH) 0 3 mg/0 3 mL SOAJ Inject 0 3 mL as directed      fluticasone (FLONASE) 50 mcg/act nasal spray 2 sprays into each nostril daily      levothyroxine 25 mcg tablet TAKE 1 TABLET DAILY MONDAY THROUGH FRIDAY AND 2       TABLETS DAILY SATURDAY AND SUNDAY 116 tablet 1    loratadine (CLARITIN) 10 mg tablet Take 1 tablet by mouth daily      Multiple Vitamin (MULTI-VITAMIN DAILY PO) Take 1 tablet by mouth daily      Omega-3 Fatty Acids (EQL OMEGA 3 FISH OIL) 1000 MG CAPS Take 1 capsule by mouth daily      pantoprazole (PROTONIX) 40 mg tablet TAKE 1 TABLET DAILY 90 tablet 1    simvastatin (ZOCOR) 20 mg tablet TAKE 1 TABLET DAILY 90 tablet 3     No current facility-administered medications for this visit            Health Maintenance     Health Maintenance   Topic Date Due    Hepatitis C Screening  1955    BMI: Followup Plan  03/10/1972    DTaP,Tdap,and Td Vaccines (1 - Tdap) 03/10/1975    Depression Screening PHQ  05/21/2021    Fall Risk  05/21/2021    Medicare Annual Wellness Visit (AWV)  05/21/2021    BMI: Adult  01/15/2022    MAMMOGRAM  05/26/2022    Colonoscopy Surveillance  12/20/2023    Colorectal Cancer Screening  12/20/2028    Pneumococcal Vaccine: 65+ Years  Completed    Influenza Vaccine  Completed    HIB Vaccine  Aged Out    Hepatitis B Vaccine  Aged Out    IPV Vaccine  Aged Out    Hepatitis A Vaccine  Aged Out    Meningococcal ACWY Vaccine  Aged Out    HPV Vaccine  Aged Out     Immunization History   Administered Date(s) Administered    H1N1, All Formulations 10/30/2009    INFLUENZA 10/01/2015, 10/04/2016    Influenza, high dose seasonal 0 7 mL 09/04/2020    Influenza, seasonal, injectable 10/01/2008, 10/01/2016, 10/02/2017    Influenza, seasonal, injectable, preservative free 10/01/2018    Pneumococcal Conjugate 13-Valent 09/04/2020    Pneumococcal Polysaccharide PPV23 11/20/2019    Td (adult), adsorbed 06/01/1999    Zoster 04/30/2014, 03/10/2015       GABBY Ansari

## 2021-02-08 ENCOUNTER — TELEPHONE (OUTPATIENT)
Dept: FAMILY MEDICINE CLINIC | Facility: CLINIC | Age: 66
End: 2021-02-08

## 2021-02-08 DIAGNOSIS — E78.5 HYPERLIPIDEMIA, UNSPECIFIED HYPERLIPIDEMIA TYPE: ICD-10-CM

## 2021-02-08 DIAGNOSIS — K21.9 GASTROESOPHAGEAL REFLUX DISEASE WITHOUT ESOPHAGITIS: ICD-10-CM

## 2021-02-08 RX ORDER — PANTOPRAZOLE SODIUM 40 MG/1
TABLET, DELAYED RELEASE ORAL
Qty: 90 TABLET | Refills: 1 | Status: SHIPPED | OUTPATIENT
Start: 2021-02-08 | End: 2021-04-14 | Stop reason: SDUPTHER

## 2021-02-08 RX ORDER — SIMVASTATIN 20 MG
20 TABLET ORAL DAILY
Qty: 90 TABLET | Refills: 3 | Status: SHIPPED | OUTPATIENT
Start: 2021-02-08 | End: 2021-09-15 | Stop reason: SDUPTHER

## 2021-02-08 NOTE — TELEPHONE ENCOUNTER
Patient called, needs a refill on simvastatin 20mg, 90 day supply, she needs a written copy of the script, once it is done, she would like it mailed to her

## 2021-02-13 DIAGNOSIS — Z23 ENCOUNTER FOR IMMUNIZATION: ICD-10-CM

## 2021-02-22 ENCOUNTER — OFFICE VISIT (OUTPATIENT)
Dept: FAMILY MEDICINE CLINIC | Facility: CLINIC | Age: 66
End: 2021-02-22
Payer: MEDICARE

## 2021-02-22 VITALS
HEART RATE: 78 BPM | HEIGHT: 61 IN | OXYGEN SATURATION: 97 % | WEIGHT: 199.8 LBS | SYSTOLIC BLOOD PRESSURE: 126 MMHG | BODY MASS INDEX: 37.72 KG/M2 | RESPIRATION RATE: 17 BRPM | DIASTOLIC BLOOD PRESSURE: 74 MMHG | TEMPERATURE: 97.6 F

## 2021-02-22 DIAGNOSIS — Z01.818 PREOPERATIVE EVALUATION OF A MEDICAL CONDITION TO RULE OUT SURGICAL CONTRAINDICATIONS (TAR REQUIRED): Primary | ICD-10-CM

## 2021-02-22 PROCEDURE — 99214 OFFICE O/P EST MOD 30 MIN: CPT | Performed by: FAMILY MEDICINE

## 2021-02-22 NOTE — PROGRESS NOTES
FAMILY PRACTICE OFFICE VISIT       NAME: Rimma Reyes  AGE: 76 y o  SEX: female       : 1955        MRN: [id number]    DATE: 2021  TIME: 12:07 PM    Assessment and Plan     Problem List Items Addressed This Visit        Other    Preoperative evaluation of a medical condition to rule out surgical contraindications (TAR required) - Primary      Preoperative consultation  Overall the patient appears to be in stable health  She is medically cleared to have upcoming cataract surgery as described  Chief Complaint     Chief Complaint   Patient presents with    Pre-op Exam       History of Present Illness       Patient in the office to have preoperative consultation  She is scheduled to have left eye cataract surgery to be performed on 2021 by Dr Mejia  Patient denies any recent illness  She did have an EKG 2020 that showed sinus bradycardia 59 beats per minute, normal axis, negative acute ischemic changes, nonspecific ST -T changes      Review of Systems   Review of Systems   Constitutional: Negative  HENT: Negative  Eyes: Positive for visual disturbance  Respiratory: Negative  Cardiovascular: Negative  Gastrointestinal: Negative  Genitourinary: Negative  Musculoskeletal: Negative  Skin: Negative  Neurological: Negative  Psychiatric/Behavioral: Negative  Active Problem List     Patient Active Problem List   Diagnosis    Hyperlipidemia    Esophagitis, reflux    Hypothyroidism    EDEL (obstructive sleep apnea)    Vitamin D deficiency    Insufficient sleep syndrome    Chronic rhinitis    Mild intermittent asthma without complication    Gastroesophageal reflux disease without esophagitis    Obesity (BMI 30-39  9)    Palpitations    Hyperglycemia    Paresthesia    Pruritus    Preoperative evaluation of a medical condition to rule out surgical contraindications (TAR required)       Past Medical History:  Past Medical History:   Diagnosis Date    Asthma     Glaucoma        Past Surgical History:  Past Surgical History:   Procedure Laterality Date    EYE SURGERY Left 06/08/2020     macular gap - 06/08/2020 & 11/02/2020       Family History:  Family History   Problem Relation Age of Onset    Heart disease Mother     Other Maternal Grandmother         Thyroid disorder    No Known Problems Maternal Grandfather     No Known Problems Paternal Grandmother     No Known Problems Paternal Grandfather     No Known Problems Father     No Known Problems Son     No Known Problems Son     No Known Problems Son     No Known Problems Brother     No Known Problems Brother        Social History:  Social History     Socioeconomic History    Marital status: /Civil Union     Spouse name: Not on file    Number of children: Not on file    Years of education: Not on file    Highest education level: Not on file   Occupational History    Not on file   Social Needs    Financial resource strain: Not on file    Food insecurity     Worry: Not on file     Inability: Not on file   [employer] needs     Medical: Not on file     Non-medical: Not on file   Tobacco Use    Smoking status: Never Smoker    Smokeless tobacco: Never Used   Substance and Sexual Activity    Alcohol use: Yes     Comment: social    Drug use: No    Sexual activity: Not Currently   Lifestyle    Physical activity     Days per week: Not on file     Minutes per session: Not on file    Stress: Not on file   Relationships    Social connections     Talks on phone: Not on file     Gets together: Not on file     Attends Protestant service: Not on file     Active member of club or organization: Not on file     Attends meetings of clubs or organizations: Not on file     Relationship status: Not on file    Intimate partner violence     Fear of current or ex partner: Not on file     Emotionally abused: Not on file     Physically abused: Not on file Forced sexual activity: Not on file   Other Topics Concern    Not on file   Social History Narrative    Not on file       Objective     Vitals:    02/22/21 1113   BP: 126/74   Pulse: 78   Resp: 17   Temp: 97 6 °F (36 4 °C)   SpO2: 97%     Wt Readings from Last 3 Encounters:   02/22/21 90 6 kg (199 lb 12 8 oz)   01/15/21 89 8 kg (198 lb)   11/30/20 89 5 kg (197 lb 6 4 oz)       Physical Exam  Vitals signs and nursing note reviewed  Constitutional:       General: She is not in acute distress  Appearance: Normal appearance  She is well-developed  She is not ill-appearing  HENT:      Right Ear: External ear normal       Left Ear: External ear normal       Nose: Nose normal    Eyes:      General:         Right eye: No discharge  Left eye: No discharge  Extraocular Movements: Extraocular movements intact  Conjunctiva/sclera: Conjunctivae normal       Pupils: Pupils are equal, round, and reactive to light  Neck:      Musculoskeletal: Normal range of motion and neck supple  Thyroid: No thyromegaly  Vascular: No carotid bruit  Cardiovascular:      Rate and Rhythm: Normal rate and regular rhythm  Heart sounds: Normal heart sounds  No murmur  Pulmonary:      Effort: Pulmonary effort is normal  No respiratory distress  Breath sounds: Normal breath sounds  No wheezing or rales  Abdominal:      General: Bowel sounds are normal       Palpations: Abdomen is soft  Tenderness: There is no abdominal tenderness  There is no guarding or rebound  Comments: NO hepatospenomegaly   Musculoskeletal: Normal range of motion  Right lower leg: No edema  Left lower leg: No edema  Lymphadenopathy:      Cervical: No cervical adenopathy  Skin:     Comments: NO RASHES   Neurological:      General: No focal deficit present  Mental Status: She is alert and oriented to person, place, and time     Psychiatric:         Mood and Affect: Mood normal          Behavior: Behavior normal          Thought Content: Thought content normal          Judgment: Judgment normal          Pertinent Laboratory/Diagnostic Studies:  Lab Results   Component Value Date    GLUCOSE 106 02/27/2015    BUN 13 06/01/2020    CREATININE 0 74 06/01/2020    CALCIUM 8 9 06/01/2020     11/02/2017    K 4 0 06/01/2020    CO2 26 06/01/2020     06/01/2020     Lab Results   Component Value Date    ALT 46 06/01/2020    AST 28 06/01/2020    ALKPHOS 81 06/01/2020    BILITOT 0 6 11/02/2017       Lab Results   Component Value Date    WBC 5 13 06/01/2020    HGB 14 0 06/01/2020    HCT 44 1 06/01/2020    MCV 96 06/01/2020     06/01/2020       Lab Results   Component Value Date    TSH 2 04 06/26/2019       Lab Results   Component Value Date    CHOL 189 11/02/2017     Lab Results   Component Value Date    TRIG 157 (H) 01/29/2020     Lab Results   Component Value Date    HDL 43 01/29/2020     Lab Results   Component Value Date    LDLCALC 120 (H) 01/29/2020     Lab Results   Component Value Date    HGBA1C 5 9 (H) 06/01/2020       Results for orders placed or performed in visit on 06/01/20   Hemoglobin A1C   Result Value Ref Range    Hemoglobin A1C 5 9 (H) Normal 3 8-5 6%; PreDiabetic 5 7-6 4%;  Diabetic >=6 5%; Glycemic control for adults with diabetes <7 0% %     mg/dl   CBC   Result Value Ref Range    WBC 5 13 4 31 - 10 16 Thousand/uL    RBC 4 59 3 81 - 5 12 Million/uL    Hemoglobin 14 0 11 5 - 15 4 g/dL    Hematocrit 44 1 34 8 - 46 1 %    MCV 96 82 - 98 fL    MCH 30 5 26 8 - 34 3 pg    MCHC 31 7 31 4 - 37 4 g/dL    RDW 13 2 11 6 - 15 1 %    Platelets 013 278 - 575 Thousands/uL    MPV 9 5 8 9 - 12 7 fL   Comprehensive metabolic panel   Result Value Ref Range    Sodium 137 136 - 145 mmol/L    Potassium 4 0 3 5 - 5 3 mmol/L    Chloride 105 100 - 108 mmol/L    CO2 26 21 - 32 mmol/L    ANION GAP 6 4 - 13 mmol/L    BUN 13 5 - 25 mg/dL    Creatinine 0 74 0 60 - 1 30 mg/dL    Glucose, Fasting 92 65 - 99 mg/dL    Calcium 8 9 8 3 - 10 1 mg/dL    AST 28 5 - 45 U/L    ALT 46 12 - 78 U/L    Alkaline Phosphatase 81 46 - 116 U/L    Total Protein 8 0 6 4 - 8 2 g/dL    Albumin 3 8 3 5 - 5 0 g/dL    Total Bilirubin 0 51 0 20 - 1 00 mg/dL    eGFR 85 ml/min/1 73sq m   TSH, 3rd generation   Result Value Ref Range    TSH 3RD GENERATON 2 980 0 358 - 3 740 uIU/mL       No orders of the defined types were placed in this encounter        ALLERGIES:  Allergies   Allergen Reactions    Bee Venom Anaphylaxis     Hornets, wasp, and bees    Codeine Nausea Only and Vomiting     Reaction Date: 24Aug2011;     Other      Narcotic ---nausea and vomiting     Oxycodone-Acetaminophen Nausea Only and Vomiting     Reaction Date: 24Aug2011;     Vicodin  [Hydrocodone-Acetaminophen] Nausea Only and Vomiting     Reaction Date: 24Aug2011;     Sulfa Antibiotics Rash       Current Medications     Current Outpatient Medications   Medication Sig Dispense Refill    Biotin 1000 MCG tablet Take 1,000 mcg by mouth daily      budesonide-formoterol (SYMBICORT) 160-4 5 mcg/act inhaler Inhale daily       Cholecalciferol (CVS VITAMIN D) 2000 units CAPS Take 1 capsule by mouth daily      co-enzyme Q-10 30 MG capsule Take 30 mg by mouth 3 (three) times a day      EPINEPHrine (EPIPEN 2-CHEIKH) 0 3 mg/0 3 mL SOAJ Inject 0 3 mL as directed      fluticasone (FLONASE) 50 mcg/act nasal spray 2 sprays into each nostril daily      levothyroxine 25 mcg tablet TAKE 1 TABLET DAILY MONDAY THROUGH FRIDAY AND 2       TABLETS DAILY SATURDAY AND SUNDAY 116 tablet 1    loratadine (CLARITIN) 10 mg tablet Take 1 tablet by mouth daily      Multiple Vitamin (MULTI-VITAMIN DAILY PO) Take 1 tablet by mouth daily      Omega-3 Fatty Acids (EQL OMEGA 3 FISH OIL) 1000 MG CAPS Take 1 capsule by mouth daily      pantoprazole (PROTONIX) 40 mg tablet TAKE 1 TABLET DAILY 90 tablet 1    simvastatin (ZOCOR) 20 mg tablet Take 1 tablet (20 mg total) by mouth daily 90 tablet 3     No current facility-administered medications for this visit            Health Maintenance     Health Maintenance   Topic Date Due    Hepatitis C Screening  1955    COVID-19 Vaccine (1 of 2) 03/10/1970    BMI: Followup Plan  03/10/1972    DTaP,Tdap,and Td Vaccines (1 - Tdap) 03/10/1975    Fall Risk  05/21/2021    Depression Screening PHQ  05/21/2021    Medicare Annual Wellness Visit (AWV)  05/21/2021    BMI: Adult  02/22/2022    MAMMOGRAM  05/26/2022    Colonoscopy Surveillance  12/20/2023    Colorectal Cancer Screening  12/20/2028    Pneumococcal Vaccine: 65+ Years  Completed    Influenza Vaccine  Completed    HIB Vaccine  Aged Out    Hepatitis B Vaccine  Aged Out    IPV Vaccine  Aged Out    Hepatitis A Vaccine  Aged Out    Meningococcal ACWY Vaccine  Aged Out    HPV Vaccine  Aged Out     Immunization History   Administered Date(s) Administered    H1N1, All Formulations 10/30/2009    INFLUENZA 10/01/2015, 10/04/2016    Influenza, high dose seasonal 0 7 mL 09/04/2020    Influenza, seasonal, injectable 10/01/2008, 10/01/2016, 10/02/2017    Influenza, seasonal, injectable, preservative free 10/01/2018    Pneumococcal Conjugate 13-Valent 09/04/2020    Pneumococcal Polysaccharide PPV23 11/20/2019    Td (adult), adsorbed 06/01/1999    Zoster 04/30/2014, 05/30/0478       Tiana Rosario MD

## 2021-02-22 NOTE — ASSESSMENT & PLAN NOTE
Preoperative consultation  Overall the patient appears to be in stable health  She is medically cleared to have upcoming cataract surgery as described

## 2021-03-26 DIAGNOSIS — E03.9 HYPOTHYROIDISM, UNSPECIFIED TYPE: ICD-10-CM

## 2021-03-26 RX ORDER — LEVOTHYROXINE SODIUM 0.03 MG/1
TABLET ORAL
Qty: 116 TABLET | Refills: 1 | Status: SHIPPED | OUTPATIENT
Start: 2021-03-26 | End: 2021-08-31

## 2021-04-14 DIAGNOSIS — K21.9 GASTROESOPHAGEAL REFLUX DISEASE WITHOUT ESOPHAGITIS: ICD-10-CM

## 2021-04-14 NOTE — TELEPHONE ENCOUNTER
Patient requesting a hand-written script be mailed to her for this med refill because patient's insurance changed and she is currently in the process of finding a pharmacy that accepts her new insurance, so in the meantime she wants the script so she can bring it to the pharmacy herself once she finds one   Patient verified her address as [address] [place] [id number]

## 2021-04-15 RX ORDER — PANTOPRAZOLE SODIUM 40 MG/1
40 TABLET, DELAYED RELEASE ORAL DAILY
Qty: 90 TABLET | Refills: 1 | Status: SHIPPED | OUTPATIENT
Start: 2021-04-15 | End: 2021-06-28

## 2021-06-01 ENCOUNTER — HOSPITAL ENCOUNTER (OUTPATIENT)
Dept: MAMMOGRAPHY | Facility: IMAGING CENTER | Age: 66
Discharge: HOME | End: 2021-06-01
Payer: MEDICARE

## 2021-06-01 VITALS — WEIGHT: 196 LBS | HEIGHT: 61 IN | BODY MASS INDEX: 37 KG/M2

## 2021-06-01 DIAGNOSIS — Z12.31 SCREENING MAMMOGRAM, ENCOUNTER FOR: ICD-10-CM

## 2021-06-01 PROCEDURE — 77067 SCR MAMMO BI INCL CAD: CPT

## 2021-06-01 PROCEDURE — 77063 BREAST TOMOSYNTHESIS BI: CPT

## 2021-06-03 ENCOUNTER — TRANSCRIBE ORDERS (OUTPATIENT)
Dept: ADMINISTRATIVE | Facility: HOSPITAL | Age: 66
End: 2021-06-03

## 2021-06-03 DIAGNOSIS — Z12.31 ENCOUNTER FOR SCREENING MAMMOGRAM FOR MALIGNANT NEOPLASM OF BREAST: Primary | ICD-10-CM

## 2021-06-28 DIAGNOSIS — K21.9 GASTROESOPHAGEAL REFLUX DISEASE WITHOUT ESOPHAGITIS: ICD-10-CM

## 2021-06-28 RX ORDER — PANTOPRAZOLE SODIUM 40 MG/1
TABLET, DELAYED RELEASE ORAL
Qty: 90 TABLET | Refills: 1 | Status: SHIPPED | OUTPATIENT
Start: 2021-06-28 | End: 2021-12-27

## 2021-08-19 ENCOUNTER — OFFICE VISIT (OUTPATIENT)
Dept: FAMILY MEDICINE CLINIC | Facility: CLINIC | Age: 66
End: 2021-08-19
Payer: MEDICARE

## 2021-08-19 VITALS
BODY MASS INDEX: 37 KG/M2 | HEART RATE: 71 BPM | RESPIRATION RATE: 16 BRPM | OXYGEN SATURATION: 96 % | WEIGHT: 196 LBS | TEMPERATURE: 97.6 F | SYSTOLIC BLOOD PRESSURE: 140 MMHG | HEIGHT: 61 IN | DIASTOLIC BLOOD PRESSURE: 70 MMHG

## 2021-08-19 DIAGNOSIS — R73.9 HYPERGLYCEMIA: ICD-10-CM

## 2021-08-19 DIAGNOSIS — R05.9 COUGH: ICD-10-CM

## 2021-08-19 DIAGNOSIS — E78.5 HYPERLIPIDEMIA, UNSPECIFIED HYPERLIPIDEMIA TYPE: Primary | ICD-10-CM

## 2021-08-19 DIAGNOSIS — E03.9 HYPOTHYROIDISM, UNSPECIFIED TYPE: ICD-10-CM

## 2021-08-19 DIAGNOSIS — G89.29 CHRONIC BILATERAL THORACIC BACK PAIN: ICD-10-CM

## 2021-08-19 DIAGNOSIS — M54.6 CHRONIC BILATERAL THORACIC BACK PAIN: ICD-10-CM

## 2021-08-19 DIAGNOSIS — E66.01 OBESITY, MORBID (HCC): ICD-10-CM

## 2021-08-19 PROCEDURE — G0439 PPPS, SUBSEQ VISIT: HCPCS | Performed by: FAMILY MEDICINE

## 2021-08-19 PROCEDURE — 1123F ACP DISCUSS/DSCN MKR DOCD: CPT | Performed by: FAMILY MEDICINE

## 2021-08-19 PROCEDURE — 99214 OFFICE O/P EST MOD 30 MIN: CPT | Performed by: FAMILY MEDICINE

## 2021-08-19 NOTE — PROGRESS NOTES
Assessment and Plan:     Problem List Items Addressed This Visit        Endocrine    Hypothyroidism    Relevant Orders    Hemoglobin A1C    CBC    Comprehensive metabolic panel    Lipid panel    TSH, 3rd generation       Other    Hyperlipidemia - Primary    Relevant Orders    Hemoglobin A1C    CBC    Comprehensive metabolic panel    Lipid panel    TSH, 3rd generation    Hyperglycemia    Relevant Orders    Hemoglobin A1C    CBC    Comprehensive metabolic panel    Lipid panel    TSH, 3rd generation    Obesity, morbid ([facility] [address] )      Other Visit Diagnoses     Type 2 diabetes mellitus with complication, without long-term current use of insulin ([facility] [address] )               Preventive health issues were discussed with patient, and age appropriate screening tests were ordered as noted in patient's After Visit Summary  Personalized health advice and appropriate referrals for health education or preventive services given if needed, as noted in patient's After Visit Summary  History of Present Illness:     Patient presents for Medicare Annual Wellness visit    Patient Care Team:  Arianna Hung MD as PCP - General  Arianna Hung MD     Problem List:     Patient Active Problem List   Diagnosis    Hyperlipidemia    Esophagitis, reflux    Hypothyroidism    EDEL (obstructive sleep apnea)    Vitamin D deficiency    Insufficient sleep syndrome    Chronic rhinitis    Mild intermittent asthma without complication    Gastroesophageal reflux disease without esophagitis    Obesity (BMI 30-39  9)    Palpitations    Hyperglycemia    Paresthesia    Pruritus    Preoperative evaluation of a medical condition to rule out surgical contraindications (TAR required)    Obesity, morbid [facility])      Past Medical and Surgical History:     Past Medical History:   Diagnosis Date    Asthma     Glaucoma      Past Surgical History:   Procedure Laterality Date    EYE SURGERY Left 06/08/2020     macular gap - 06/08/2020 & 11/02/2020      Family History:     Family History   Problem Relation Age of Onset    Heart disease Mother     Other Maternal Grandmother         Thyroid disorder    No Known Problems Maternal Grandfather     No Known Problems Paternal Grandmother     No Known Problems Paternal Grandfather     No Known Problems Father     No Known Problems Son     No Known Problems Son     No Known Problems Son     No Known Problems Brother     No Known Problems Brother     No Known Problems Maternal Aunt     Breast cancer Paternal Aunt         52's    Breast cancer Cousin         age unknown, 42's    Breast cancer Cousin         early 52's      Social History:     Social History     Socioeconomic History    Marital status: /Civil Union     Spouse name: Not on file    Number of children: Not on file    Years of education: Not on file    Highest education level: Not on file   Occupational History    Not on file   Tobacco Use    Smoking status: Never Smoker    Smokeless tobacco: Never Used   Vaping Use    Vaping Use: Never used   Substance and Sexual Activity    Alcohol use: Yes     Comment: social    Drug use: No    Sexual activity: Not Currently   Other Topics Concern    Not on file   Social History Narrative    Not on file     Social Determinants of Health     Financial Resource Strain:     Difficulty of Paying Living Expenses:    Food Insecurity:     Worried About Running Out of Food in the Last Year:     [address] in the Last Year:    Transportation Needs:     Lack of Transportation (Medical):      Lack of Transportation (Non-Medical):    Physical Activity:     Days of Exercise per Week:     Minutes of Exercise per Session:    Stress:     Feeling of Stress :    Social Connections:     Frequency of Communication with Friends and Family:     Frequency of Social Gatherings with Friends and Family:     Attends Worship Services:     Active Member of Clubs or Organizations:     Attends Club or Organization Meetings:     Marital Status:    Intimate Partner Violence:     Fear of Current or Ex-Partner:     Emotionally Abused:     Physically Abused:     Sexually Abused:       Medications and Allergies:     Current Outpatient Medications   Medication Sig Dispense Refill    Biotin 1000 MCG tablet Take 1,000 mcg by mouth daily      budesonide-formoterol (SYMBICORT) 160-4 5 mcg/act inhaler Inhale daily       Cholecalciferol (CVS VITAMIN D) 2000 units CAPS Take 1 capsule by mouth daily      co-enzyme Q-10 30 MG capsule Take 30 mg by mouth 3 (three) times a day      EPINEPHrine (EPIPEN 2-CHEIKH) 0 3 mg/0 3 mL SOAJ Inject 0 3 mL as directed      fluticasone (FLONASE) 50 mcg/act nasal spray 2 sprays into each nostril daily      levothyroxine 25 mcg tablet TAKE 1 TABLET DAILY MONDAY THROUGH FRIDAY AND 2       TABLETS DAILY SATURDAY AND SUNDAY 116 tablet 1    loratadine (CLARITIN) 10 mg tablet Take 1 tablet by mouth daily      Multiple Vitamin (MULTI-VITAMIN DAILY PO) Take 1 tablet by mouth daily      Omega-3 Fatty Acids (EQL OMEGA 3 FISH OIL) 1000 MG CAPS Take 1 capsule by mouth daily      pantoprazole (PROTONIX) 40 mg tablet TAKE 1 TABLET BY MOUTH DAILY  90 tablet 1    simvastatin (ZOCOR) 20 mg tablet Take 1 tablet (20 mg total) by mouth daily 90 tablet 3     No current facility-administered medications for this visit       Allergies   Allergen Reactions    Bee Venom Anaphylaxis     Hornets, wasp, and bees    Codeine Nausea Only and Vomiting     Reaction Date: 24Aug2011;     Other      Narcotic ---nausea and vomiting     Oxycodone-Acetaminophen Nausea Only and Vomiting     Reaction Date: 24Aug2011;     Vicodin  [Hydrocodone-Acetaminophen] Nausea Only and Vomiting     Reaction Date: 24Aug2011;     Sulfa Antibiotics Rash      Immunizations:     Immunization History   Administered Date(s) Administered    H1N1, All Formulations 10/30/2009    INFLUENZA 10/01/2015, 10/04/2016    Influenza, high dose seasonal 0 7 mL 09/04/2020    Influenza, seasonal, injectable 10/01/2008, 10/01/2016, 10/02/2017    Influenza, seasonal, injectable, preservative free 10/01/2018    Pneumococcal Conjugate 13-Valent 09/04/2020    Pneumococcal Polysaccharide PPV23 11/20/2019    SARS-CoV-2 / COVID-19 03/20/2021    SARS-CoV-2 / COVID-19 mRNA IM ([place]) 02/23/2021, 03/23/2021    Td (adult), adsorbed 06/01/1999    Zoster 04/30/2014, 03/10/2015      Health Maintenance:         Topic Date Due    Hepatitis C Screening  Never done    Breast Cancer Screening: Mammogram  06/01/2023    Colorectal Cancer Screening  12/20/2023         Topic Date Due    DTaP,Tdap,and Td Vaccines (1 - Tdap) 03/10/1975    Influenza Vaccine (1) 09/01/2021      Medicare Health Risk Assessment:     /70 (BP Location: Left arm, Patient Position: Sitting, Cuff Size: Adult)   Pulse 71   Temp 97 6 °F (36 4 °C) (Temporal)   Resp 16   Ht 5' 1" (1 549 m)   Wt 88 9 kg (196 lb)   SpO2 96%   BMI 37 03 kg/m²          Health Risk Assessment:   Patient rates overall health as good  Patient feels that their physical health rating is same  Patient is satisfied with their life  Eyesight was rated as slightly worse  Hearing was rated as slightly worse  Patient feels that their emotional and mental health rating is slightly better  Patients states they are sometimes angry  Patient states they are sometimes unusually tired/fatigued  Pain experienced in the last 7 days has been some  Patient's pain rating has been 2/10  Patient states that she has experienced no weight loss or gain in last 6 months  Fall Risk Screening: In the past year, patient has experienced: no history of falling in past year      Urinary Incontinence Screening:   Patient has leaked urine accidently in the last six months  Home Safety:  Patient does not have trouble with stairs inside or outside of their home   Patient has working smoke alarms and has working carbon monoxide detector  Home safety hazards include: none  Nutrition:   Current diet is Regular  Medications:   Patient is currently taking over-the-counter supplements  OTC medications include: multivitamin, Vit D, fish oil, CoQ10, Vit D  Patient is able to manage medications  Activities of Daily Living (ADLs)/Instrumental Activities of Daily Living (IADLs):   Walk and transfer into and out of bed and chair?: Yes  Dress and groom yourself?: Yes    Bathe or shower yourself?: Yes    Feed yourself? Yes  Do your laundry/housekeeping?: Yes  Manage your money, pay your bills and track your expenses?: Yes  Make your own meals?: Yes    Do your own shopping?: Yes    [place]    Previous Hospitalizations:   Any hospitalizations or ED visits within the last 12 months?: No      Advance Care Planning:   Living will: Yes    Durable POA for healthcare: Yes    Advanced directive: Yes      PREVENTIVE SCREENINGS      Cardiovascular Screening:    General: Screening Not Indicated, History Lipid Disorder and Risks and Benefits Discussed    Due for: Lipid Panel      Diabetes Screening:     General: Screening Not Indicated, History Diabetes and Risks and Benefits Discussed    Due for: Blood Glucose      Colorectal Cancer Screening:     General: Screening Current      Breast Cancer Screening:     General: Screening Current      Cervical Cancer Screening:    General: Screening Not Indicated      Lung Cancer Screening:     General: Screening Not Indicated    Screening, Brief Intervention, and Referral to Treatment (SBIRT)    Screening  Typical number of drinks in a day: 0  Typical number of drinks in a week: 2  Interpretation: Low risk drinking behavior      AUDIT-C Screenin) How often did you have a drink containing alcohol in the past year? 2 to 3 times a week  2) How many drinks did you have on a typical day when you were drinking in the past year? never  3) How often did you have 6 or more drinks on one occasion in the past year? never    AUDIT-C Score: 3  Interpretation: Score 3-12 (female): POSITIVE screen for alcohol misuse    AUDIT Screenin) How often during the last year have you found that you were not able to stop drinking once you had started? 0 - never  5) How often during the last year have you failed to do what was normally expected from you because of drinking? 0 - never  6) How often during the last year have you needed a first drink in the morning to get yourself going after a heavy drinking session?  0 - never  7) How often during the last year have you had a feeling of guilt or remorse after drinking? 0 - never  8) How often during the last year have you been unable to remember what happened the night before because you had been drinking? 0 - never  9) Have you or someone else been injured as a result of your drinking? 0 - no  10) Has a relative or friend or a doctor or another health worker been concerned about your drinking or suggested you cut down? 0 - no    AUDIT Score: 3  Interpretation: Low risk alcohol consumption    Single Item Drug Screening:  How often have you used an illegal drug (including marijuana) or a prescription medication for non-medical reasons in the past year? never    Single Item Drug Screen Score: 0  Interpretation: Negative screen for possible drug use disorder      Arianna Hung MD

## 2021-08-19 NOTE — PROGRESS NOTES
FAMILY PRACTICE OFFICE VISIT       NAME: Cynthia Reyes  AGE: 78 y o  SEX: female       : 1955        MRN: [id number]    DATE: 2021  TIME: 1:59 PM    Assessment and Plan     Problem List Items Addressed This Visit        Endocrine    Hypothyroidism      Hypothyroidism  Patient will check TSH blood work and continue with current dose of levothyroxine  We will make further recommendations pending results of test          Relevant Orders    Hemoglobin A1C    CBC    Comprehensive metabolic panel    Lipid panel    TSH, 3rd generation       Other    Hyperlipidemia - Primary       Hyperlipidemia  Patient will check lipid panel blood work and continue with current dose of statin therapy         Relevant Orders    Hemoglobin A1C    CBC    Comprehensive metabolic panel    Lipid panel    TSH, 3rd generation    Hyperglycemia      Hyperglycemia  Patient will check hemoglobin A1c for further evaluation  Relevant Orders    Hemoglobin A1C    CBC    Comprehensive metabolic panel    Lipid panel    TSH, 3rd generation    Obesity, morbid (HCC)    Cough      Cough  Patient will continue with current regimen of inhalers  She is scheduled to see her allergist for follow-up in 2021  Relevant Orders    XR chest pa & lateral    Chronic bilateral thoracic back pain      Back pain  Patient will obtain chest x-ray for further evaluation  She may require short course of physical therapy if musculoskeletal in origin  We will make further recommendations pending results of test              BMI Counseling: Body mass index is 37 03 kg/m²  The BMI is above normal  Nutrition recommendations include decreasing portion sizes and moderation in carbohydrate intake  Exercise recommendations include exercising 3-5 times per week  No pharmacotherapy was ordered  Patient referred to PCP due to patient being overweight  Chief Complaint   No chief complaint on file        History of Present Illness Patient in the office with report of 2 month history of increased midthoracic back pain primarily when she lies down  She also notices increase morning in evening cough but fairly non-existent during the daytime  She denies any recent illness  Her COVID vaccination is up-to-date  She is very active at home with activities around her house since her  is in poor health  Patient received a massage this morning and was told she had tightness in her upper back      Review of Systems   Review of Systems   Constitutional: Negative  HENT: Negative  Eyes: Negative  Respiratory: Positive for cough  Cardiovascular: Negative  Gastrointestinal: Negative  Genitourinary: Negative  Musculoskeletal: Positive for back pain  Skin: Negative  Neurological: Negative  Psychiatric/Behavioral: Negative  Active Problem List     Patient Active Problem List   Diagnosis    Hyperlipidemia    Esophagitis, reflux    Hypothyroidism    EDEL (obstructive sleep apnea)    Vitamin D deficiency    Insufficient sleep syndrome    Chronic rhinitis    Mild intermittent asthma without complication    Gastroesophageal reflux disease without esophagitis    Obesity (BMI 30-39  9)    Palpitations    Hyperglycemia    Paresthesia    Pruritus    Preoperative evaluation of a medical condition to rule out surgical contraindications (TAR required)    Obesity, morbid ([address] )    Cough    Chronic bilateral thoracic back pain       Past Medical History:  Past Medical History:   Diagnosis Date    Asthma     Glaucoma        Past Surgical History:  Past Surgical History:   Procedure Laterality Date    EYE SURGERY Left 06/08/2020     macular gap - 06/08/2020 & 11/02/2020       Family History:  Family History   Problem Relation Age of Onset    Heart disease Mother     Other Maternal Grandmother         Thyroid disorder    No Known Problems Maternal Grandfather     No Known Problems Paternal Grandmother  No Known Problems Paternal Grandfather     No Known Problems Father     No Known Problems Son     No Known Problems Son     No Known Problems Son     No Known Problems Brother     No Known Problems Brother     No Known Problems Maternal Aunt     Breast cancer Paternal Aunt         52's    Breast cancer Cousin         age unknown, 42's    Breast cancer Cousin         early 52's       Social History:  Social History     Socioeconomic History    Marital status: /Civil Union     Spouse name: Not on file    Number of children: Not on file    Years of education: Not on file    Highest education level: Not on file   Occupational History    Not on file   Tobacco Use    Smoking status: Never Smoker    Smokeless tobacco: Never Used   Vaping Use    Vaping Use: Never used   Substance and Sexual Activity    Alcohol use: Yes     Comment: social    Drug use: No    Sexual activity: Not Currently   Other Topics Concern    Not on file   Social History Narrative    Not on file     Social Determinants of Health     Financial Resource Strain:     Difficulty of Paying Living Expenses:    Food Insecurity:     Worried About Running Out of Food in the Last Year:     [address] in the Last Year:    Transportation Needs:     Lack of Transportation (Medical):      Lack of Transportation (Non-Medical):    Physical Activity:     Days of Exercise per Week:     Minutes of Exercise per Session:    Stress:     Feeling of Stress :    Social Connections:     Frequency of Communication with Friends and Family:     Frequency of Social Gatherings with Friends and Family:     Attends Congregational Services:     Active Member of Clubs or Organizations:     Attends Club or Organization Meetings:     Marital Status:    Intimate Partner Violence:     Fear of Current or Ex-Partner:     Emotionally Abused:     Physically Abused:     Sexually Abused:        Objective     Vitals:    08/19/21 1302   BP: 140/70 Pulse: 71   Resp: 16   Temp: 97 6 °F (36 4 °C)   SpO2: 96%     Wt Readings from Last 3 Encounters:   08/19/21 88 9 kg (196 lb)   06/01/21 88 9 kg (196 lb)   02/22/21 90 6 kg (199 lb 12 8 oz)       Physical Exam  Constitutional:       General: She is not in acute distress  Appearance: Normal appearance  She is not ill-appearing  HENT:      Head: Normocephalic and atraumatic  Eyes:      General:         Right eye: No discharge  Left eye: No discharge  Conjunctiva/sclera: Conjunctivae normal       Pupils: Pupils are equal, round, and reactive to light  Neck:      Vascular: No carotid bruit  Cardiovascular:      Rate and Rhythm: Normal rate and regular rhythm  Heart sounds: Normal heart sounds  No murmur heard  Pulmonary:      Effort: Pulmonary effort is normal       Breath sounds: Normal breath sounds  No wheezing, rhonchi or rales  Musculoskeletal:      Right lower leg: No edema  Left lower leg: No edema  Comments: Minimal tenderness to palpation along right rhomboid area of back  Negative vertebral tenderness to palpation  Normal range of motion of spine  Lymphadenopathy:      Cervical: No cervical adenopathy  Skin:     Findings: No rash  Neurological:      General: No focal deficit present  Mental Status: She is alert and oriented to person, place, and time  Cranial Nerves: No cranial nerve deficit  Psychiatric:         Mood and Affect: Mood normal          Behavior: Behavior normal          Thought Content:  Thought content normal          Judgment: Judgment normal          Pertinent Laboratory/Diagnostic Studies:  Lab Results   Component Value Date    GLUCOSE 106 02/27/2015    BUN 13 06/01/2020    CREATININE 0 74 06/01/2020    CALCIUM 8 9 06/01/2020     11/02/2017    K 4 0 06/01/2020    CO2 26 06/01/2020     06/01/2020     Lab Results   Component Value Date    ALT 46 06/01/2020    AST 28 06/01/2020    ALKPHOS 81 06/01/2020    BILITOT 0 6 11/02/2017       Lab Results   Component Value Date    WBC 5 13 06/01/2020    HGB 14 0 06/01/2020    HCT 44 1 06/01/2020    MCV 96 06/01/2020     06/01/2020       Lab Results   Component Value Date    TSH 2 04 06/26/2019       Lab Results   Component Value Date    CHOL 189 11/02/2017     Lab Results   Component Value Date    TRIG 157 (H) 01/29/2020     Lab Results   Component Value Date    HDL 43 01/29/2020     Lab Results   Component Value Date    LDLCALC 120 (H) 01/29/2020     Lab Results   Component Value Date    HGBA1C 5 9 (H) 06/01/2020       Results for orders placed or performed in visit on 06/01/20   Hemoglobin A1C   Result Value Ref Range    Hemoglobin A1C 5 9 (H) Normal 3 8-5 6%; PreDiabetic 5 7-6 4%;  Diabetic >=6 5%; Glycemic control for adults with diabetes <7 0% %     mg/dl   CBC   Result Value Ref Range    WBC 5 13 4 31 - 10 16 Thousand/uL    RBC 4 59 3 81 - 5 12 Million/uL    Hemoglobin 14 0 11 5 - 15 4 g/dL    Hematocrit 44 1 34 8 - 46 1 %    MCV 96 82 - 98 fL    MCH 30 5 26 8 - 34 3 pg    MCHC 31 7 31 4 - 37 4 g/dL    RDW 13 2 11 6 - 15 1 %    Platelets 921 665 - 260 Thousands/uL    MPV 9 5 8 9 - 12 7 fL   Comprehensive metabolic panel   Result Value Ref Range    Sodium 137 136 - 145 mmol/L    Potassium 4 0 3 5 - 5 3 mmol/L    Chloride 105 100 - 108 mmol/L    CO2 26 21 - 32 mmol/L    ANION GAP 6 4 - 13 mmol/L    BUN 13 5 - 25 mg/dL    Creatinine 0 74 0 60 - 1 30 mg/dL    Glucose, Fasting 92 65 - 99 mg/dL    Calcium 8 9 8 3 - 10 1 mg/dL    AST 28 5 - 45 U/L    ALT 46 12 - 78 U/L    Alkaline Phosphatase 81 46 - 116 U/L    Total Protein 8 0 6 4 - 8 2 g/dL    Albumin 3 8 3 5 - 5 0 g/dL    Total Bilirubin 0 51 0 20 - 1 00 mg/dL    eGFR 85 ml/min/1 73sq m   TSH, 3rd generation   Result Value Ref Range    TSH 3RD GENERATON 2 980 0 358 - 3 740 uIU/mL       Orders Placed This Encounter   Procedures    XR chest pa & lateral    Hemoglobin A1C    CBC    Comprehensive metabolic panel    Lipid panel    TSH, 3rd generation       ALLERGIES:  Allergies   Allergen Reactions    Bee Venom Anaphylaxis     Hornets, wasp, and bees    Codeine Nausea Only and Vomiting     Reaction Date: 24Aug2011;     Other      Narcotic ---nausea and vomiting     Oxycodone-Acetaminophen Nausea Only and Vomiting     Reaction Date: 24Aug2011;     Vicodin  [Hydrocodone-Acetaminophen] Nausea Only and Vomiting     Reaction Date: 24Aug2011;     Sulfa Antibiotics Rash       Current Medications     Current Outpatient Medications   Medication Sig Dispense Refill    Biotin 1000 MCG tablet Take 1,000 mcg by mouth daily      budesonide-formoterol (SYMBICORT) 160-4 5 mcg/act inhaler Inhale daily       Cholecalciferol ([employer]S VITAMIN D) 2000 units CAPS Take 1 capsule by mouth daily      co-enzyme Q-10 30 MG capsule Take 30 mg by mouth 3 (three) times a day      EPINEPHrine (EPIPEN 2-CHEIKH) 0 3 mg/0 3 mL SOAJ Inject 0 3 mL as directed      fluticasone (FLONASE) 50 mcg/act nasal spray 2 sprays into each nostril daily      levothyroxine 25 mcg tablet TAKE 1 TABLET DAILY MONDAY THROUGH FRIDAY AND 2       TABLETS DAILY SATURDAY AND SUNDAY 116 tablet 1    loratadine (CLARITIN) 10 mg tablet Take 1 tablet by mouth daily      Multiple Vitamin (MULTI-VITAMIN DAILY PO) Take 1 tablet by mouth daily      Omega-3 Fatty Acids (EQL OMEGA 3 FISH OIL) 1000 MG CAPS Take 1 capsule by mouth daily      pantoprazole (PROTONIX) 40 mg tablet TAKE 1 TABLET BY MOUTH DAILY  90 tablet 1    simvastatin (ZOCOR) 20 mg tablet Take 1 tablet (20 mg total) by mouth daily 90 tablet 3     No current facility-administered medications for this visit           Health Maintenance      I spent 25 minutes with this patient which greater than 50% was spent counseling    LINDSAY Broussard

## 2021-08-19 NOTE — ASSESSMENT & PLAN NOTE
Back pain  Patient will obtain chest x-ray for further evaluation  She may require short course of physical therapy if musculoskeletal in origin   We will make further recommendations pending results of test

## 2021-08-19 NOTE — ASSESSMENT & PLAN NOTE
Hypothyroidism  Patient will check TSH blood work and continue with current dose of levothyroxine    We will make further recommendations pending results of test

## 2021-08-19 NOTE — PATIENT INSTRUCTIONS
Medicare Preventive Visit Patient Instructions  Thank you for completing your Welcome to Medicare Visit or Medicare Annual Wellness Visit today  Your next wellness visit will be due in one year (8/20/2022)  The screening/preventive services that you may require over the next 5-10 years are detailed below  Some tests may not apply to you based off risk factors and/or age  Screening tests ordered at today's visit but not completed yet may show as past due  Also, please note that scanned in results may not display below  Preventive Screenings:  Service Recommendations Previous Testing/Comments   Colorectal Cancer Screening  * Colonoscopy    * Fecal Occult Blood Test (FOBT)/Fecal Immunochemical Test (FIT)  * Fecal DNA/Cologuard Test  * Flexible Sigmoidoscopy Age: 54-65 years old   Colonoscopy: every 10 years (may be performed more frequently if at higher risk)  OR  FOBT/FIT: every 1 year  OR  Cologuard: every 3 years  OR  Sigmoidoscopy: every 5 years  Screening may be recommended earlier than age 48 if at higher risk for colorectal cancer  Also, an individualized decision between you and your healthcare provider will decide whether screening between the ages of 74-80 would be appropriate  Colonoscopy: 12/20/2018  FOBT/FIT: Not on file  Cologuard: Not on file  Sigmoidoscopy: Not on file    Screening Current     Breast Cancer Screening Age: 36 years old  Frequency: every 1-2 years  Not required if history of left and right mastectomy Mammogram: 06/01/2021    Screening Current   Cervical Cancer Screening Between the ages of 21-29, pap smear recommended once every 3 years  Between the ages of 33-67, can perform pap smear with HPV co-testing every 5 years     Recommendations may differ for women with a history of total hysterectomy, cervical cancer, or abnormal pap smears in past  Pap Smear: Not on file    Screening Not Indicated   Hepatitis C Screening Once for adults born between 1945 and 1965  More frequently in patients at high risk for Hepatitis C Hep C Antibody: Not on file        Diabetes Screening 1-2 times per year if you're at risk for diabetes or have pre-diabetes Fasting glucose: 92 mg/dL   A1C: 5 9 %    Screening Not Indicated  History Diabetes   Cholesterol Screening Once every 5 years if you don't have a lipid disorder  May order more often based on risk factors  Lipid panel: 01/29/2020    Screening Not Indicated  History Lipid Disorder     Other Preventive Screenings Covered by Medicare:  1  Abdominal Aortic Aneurysm (AAA) Screening: covered once if your at risk  You're considered to be at risk if you have a family history of AAA  2  Lung Cancer Screening: covers low dose CT scan once per year if you meet all of the following conditions: (1) Age 50-69; (2) No signs or symptoms of lung cancer; (3) Current smoker or have quit smoking within the last 15 years; (4) You have a tobacco smoking history of at least 30 pack years (packs per day multiplied by number of years you smoked); (5) You get a written order from a healthcare provider  3  Glaucoma Screening: covered annually if you're considered high risk: (1) You have diabetes OR (2) Family history of glaucoma OR (3)  aged 48 and older OR (3)  American aged 72 and older  3  Osteoporosis Screening: covered every 2 years if you meet one of the following conditions: (1) You're estrogen deficient and at risk for osteoporosis based off medical history and other findings; (2) Have a vertebral abnormality; (3) On glucocorticoid therapy for more than 3 months; (4) Have primary hyperparathyroidism; (5) On osteoporosis medications and need to assess response to drug therapy  · Last bone density test (DXA Scan): 06/11/2019   5  HIV Screening: covered annually if you're between the age of 15-65  Also covered annually if you are younger than 13 and older than 72 with risk factors for HIV infection   For pregnant patients, it is covered up to 3 times per pregnancy  Immunizations:  Immunization Recommendations   Influenza Vaccine Annual influenza vaccination during flu season is recommended for all persons aged >= 6 months who do not have contraindications   Pneumococcal Vaccine (Prevnar and Pneumovax)  * Prevnar = PCV13  * Pneumovax = PPSV23   Adults 25-60 years old: 1-3 doses may be recommended based on certain risk factors  Adults 72 years old: Prevnar (PCV13) vaccine recommended followed by Pneumovax (PPSV23) vaccine  If already received PPSV23 since turning 65, then PCV13 recommended at least one year after PPSV23 dose  Hepatitis B Vaccine 3 dose series if at intermediate or high risk (ex: diabetes, end stage renal disease, liver disease)   Tetanus (Td) Vaccine - COST NOT COVERED BY MEDICARE PART B Following completion of primary series, a booster dose should be given every 10 years to maintain immunity against tetanus  Td may also be given as tetanus wound prophylaxis  Tdap Vaccine - COST NOT COVERED BY MEDICARE PART B Recommended at least once for all adults  For pregnant patients, recommended with each pregnancy  Shingles Vaccine (Shingrix) - COST NOT COVERED BY MEDICARE PART B  2 shot series recommended in those aged 48 and above     Health Maintenance Due:      Topic Date Due    Hepatitis C Screening  Never done    Breast Cancer Screening: Mammogram  06/01/2023    Colorectal Cancer Screening  12/20/2023     Immunizations Due:      Topic Date Due    DTaP,Tdap,and Td Vaccines (1 - Tdap) [birth year]/1975    Influenza Vaccine (1) 09/01/2021     Advance Directives   What are advance directives? Advance directives are legal documents that state your wishes and plans for medical care  These plans are made ahead of time in case you lose your ability to make decisions for yourself  Advance directives can apply to any medical decision, such as the treatments you want, and if you want to donate organs  What are the types of advance directives?   There are many types of advance directives, and each state has rules about how to use them  You may choose a combination of any of the following:  · Living will: This is a written record of the treatment you want  You can also choose which treatments you do not want, which to limit, and which to stop at a certain time  This includes surgery, medicine, IV fluid, and tube feedings  · Durable power of  for healthcare [facility]): This is a written record that states who you want to make healthcare choices for you when you are unable to make them for yourself  This person, called a proxy, is usually a family member or a friend  You may choose more than 1 proxy  · Do not resuscitate (DNR) order:  A DNR order is used in case your heart stops beating or you stop breathing  It is a request not to have certain forms of treatment, such as CPR  A DNR order may be included in other types of advance directives  · Medical directive: This covers the care that you want if you are in a coma, near death, or unable to make decisions for yourself  You can list the treatments you want for each condition  Treatment may include pain medicine, surgery, blood transfusions, dialysis, IV or tube feedings, and a ventilator (breathing machine)  · Values history: This document has questions about your views, beliefs, and how you feel and think about life  This information can help others choose the care that you would choose  Why are advance directives important? An advance directive helps you control your care  Although spoken wishes may be used, it is better to have your wishes written down  Spoken wishes can be misunderstood, or not followed  Treatments may be given even if you do not want them  An advance directive may make it easier for your family to make difficult choices about your care  Urinary Incontinence   Urinary incontinence (UI)  is when you lose control of your bladder   UI develops because your bladder cannot store or empty urine properly  The 3 most common types of UI are stress incontinence, urge incontinence, or both  Medicines:   · May be given to help strengthen your bladder control  Report any side effects of medication to your healthcare provider  Do pelvic muscle exercises often:  Your pelvic muscles help you stop urinating  Squeeze these muscles tight for 5 seconds, then relax for 5 seconds  Gradually work up to squeezing for 10 seconds  Do 3 sets of 15 repetitions a day, or as directed  This will help strengthen your pelvic muscles and improve bladder control  Train your bladder:  Go to the bathroom at set times, such as every 2 hours, even if you do not feel the urge to go  You can also try to hold your urine when you feel the urge to go  For example, hold your urine for 5 minutes when you feel the urge to go  As that becomes easier, hold your urine for 10 minutes  Self-care:   · Keep a UI record  Write down how often you leak urine and how much you leak  Make a note of what you were doing when you leaked urine  · Drink liquids as directed  You may need to limit the amount of liquid you drink to help control your urine leakage  Do not drink any liquid right before you go to bed  Limit or do not have drinks that contain caffeine or alcohol  · Prevent constipation  Eat a variety of high-fiber foods  Good examples are high-fiber cereals, beans, vegetables, and whole-grain breads  Walking is the best way to trigger your intestines to have a bowel movement  · Exercise regularly and maintain a healthy weight  Weight loss and exercise will decrease pressure on your bladder and help you control your leakage  · Use a catheter as directed  to help empty your bladder  A catheter is a tiny, plastic tube that is put into your bladder to drain your urine  · Go to behavior therapy as directed  Behavior therapy may be used to help you learn to control your urge to urinate      Weight Management   Why it is important to manage your weight:  Being overweight increases your risk of health conditions such as heart disease, high blood pressure, type 2 diabetes, and certain types of cancer  It can also increase your risk for osteoarthritis, sleep apnea, and other respiratory problems  Aim for a slow, steady weight loss  Even a small amount of weight loss can lower your risk of health problems  How to lose weight safely:  A safe and healthy way to lose weight is to eat fewer calories and get regular exercise  You can lose up about 1 pound a week by decreasing the number of calories you eat by 500 calories each day  Healthy meal plan for weight management:  A healthy meal plan includes a variety of foods, contains fewer calories, and helps you stay healthy  A healthy meal plan includes the following:  · Eat whole-grain foods more often  A healthy meal plan should contain fiber  Fiber is the part of grains, fruits, and vegetables that is not broken down by your body  Whole-grain foods are healthy and provide extra fiber in your diet  Some examples of whole-grain foods are whole-wheat breads and pastas, oatmeal, brown rice, and bulgur  · Eat a variety of vegetables every day  Include dark, leafy greens such as spinach, kale, glenroy greens, and mustard greens  Eat yellow and orange vegetables such as carrots, sweet potatoes, and winter squash  · Eat a variety of fruits every day  Choose fresh or canned fruit (canned in its own juice or light syrup) instead of juice  Fruit juice has very little or no fiber  · Eat low-fat dairy foods  Drink fat-free (skim) milk or 1% milk  Eat fat-free yogurt and low-fat cottage cheese  Try low-fat cheeses such as mozzarella and other reduced-fat cheeses  · Choose meat and other protein foods that are low in fat  Choose beans or other legumes such as split peas or lentils  Choose fish, skinless poultry (chicken or turkey), or lean cuts of red meat (beef or pork)   Before you cook meat or poultry, cut off any visible fat  · Use less fat and oil  Try baking foods instead of frying them  Add less fat, such as margarine, sour cream, regular salad dressing and mayonnaise to foods  Eat fewer high-fat foods  Some examples of high-fat foods include french fries, doughnuts, ice cream, and cakes  · Eat fewer sweets  Limit foods and drinks that are high in sugar  This includes candy, cookies, regular soda, and sweetened drinks  Exercise:  Exercise at least 30 minutes per day on most days of the week  Some examples of exercise include walking, biking, dancing, and swimming  You can also fit in more physical activity by taking the stairs instead of the elevator or parking farther away from stores  Ask your healthcare provider about the best exercise plan for you  Alcohol Use and Your Health    Drinking too much can harm your health  Excessive alcohol use leads to about 88,000 death in the [place] each year, and shortens the life of those who diet by almost 30 years  Further, excessive drinking cost the economy $249 billion in 2010  Most excessive drinkers are not alcohol dependent  Excessive alcohol use has immediate effects that increase the risk of many harmful health conditions  These are most often the result of binge drinking  Over time, excessive alcohol use can lead to the development of chronic diseases and other series health problems  What is considered a "drink"? Excessive alcohol use includes:  · Binge Drinking: For women, 4 or more drinks consumed on one occasion  For men, 5 or more drinks consumed on one occasion  · Heavy Drinking: For women, 8 or more drinks per week   For men, 15 or more drinks per week  · Any alcohol used by pregnant women  · Any alcohol used by those under the age of 21 years    If you choose to drink, do so in moderation:  · Do not drink at all if you are under the age of 24, or if you are or may be pregnant, or have health problems that could be made worse by drinking  · For women, up to 1 drink per day  · For men, up to 2 drinks a day    No one should begin drinking or drink more frequently based on potential health benefits    Short-Term Health Risks:  · Injuries: motor vehicle crashes, falls, drownings, burns  · Violence: homicide, suicide, sexual assault, intimate partner violence  · Alcohol poisoning  · Reproductive health: risky sexual behaviors, unintended prengnacy, sexually transmitted diseases, miscarriage, stillbirth, fetal alcohol syndrome    Long-Term Health Risks:  · Chronic diseases: high blood pressure, heart disease, stroke, liver disease, digestive problems  · Cancers: breast, mouth and throat, liver, colon  · Learning and memory problems: dementia, poor school performance  · Mental health: depression, anxiety, insomnia  · Social problems: lost productivity, family problems, unemployment  · Alcohol dependence    For support and more information:  · Substance Abuse and [facility] , [address] [place]  Web Address: https://[employer]/    · Alcoholics Anonymous        Web Address: http://[facility]/    https://www cdc gov/alcohol/fact-sheets/alcohol-use htm     © Copyright [employer] 2018 Information is for End User's use only and may not be sold, redistributed or otherwise used for commercial purposes   All illustrations and images included in CareNotes® are the copyrighted property of [employer]  or [address] [place] [address]

## 2021-08-19 NOTE — ASSESSMENT & PLAN NOTE
Cough  Patient will continue with current regimen of inhalers  She is scheduled to see her allergist for follow-up in October 2021

## 2021-08-20 ENCOUNTER — HOSPITAL ENCOUNTER (OUTPATIENT)
Dept: RADIOLOGY | Facility: HOSPITAL | Age: 66
Discharge: HOME | End: 2021-08-20
Payer: MEDICARE

## 2021-08-20 DIAGNOSIS — R05.9 COUGH: ICD-10-CM

## 2021-08-20 PROCEDURE — 71046 X-RAY EXAM CHEST 2 VIEWS: CPT

## 2021-08-27 ENCOUNTER — APPOINTMENT (OUTPATIENT)
Dept: LAB | Facility: CLINIC | Age: 66
End: 2021-08-27
Payer: MEDICARE

## 2021-08-27 ENCOUNTER — TELEPHONE (OUTPATIENT)
Dept: FAMILY MEDICINE CLINIC | Facility: CLINIC | Age: 66
End: 2021-08-27

## 2021-08-27 DIAGNOSIS — E03.9 HYPOTHYROIDISM, UNSPECIFIED TYPE: ICD-10-CM

## 2021-08-27 DIAGNOSIS — E78.5 HYPERLIPIDEMIA, UNSPECIFIED HYPERLIPIDEMIA TYPE: ICD-10-CM

## 2021-08-27 DIAGNOSIS — R73.9 HYPERGLYCEMIA: ICD-10-CM

## 2021-08-27 LAB
ALBUMIN SERPL BCP-MCNC: 3.5 G/DL (ref 3.5–5)
ALP SERPL-CCNC: 80 U/L (ref 46–116)
ALT SERPL W P-5'-P-CCNC: 51 U/L (ref 12–78)
ANION GAP SERPL CALCULATED.3IONS-SCNC: 6 MMOL/L (ref 4–13)
AST SERPL W P-5'-P-CCNC: 33 U/L (ref 5–45)
BILIRUB SERPL-MCNC: 0.47 MG/DL (ref 0.2–1)
BUN SERPL-MCNC: 15 MG/DL (ref 5–25)
CALCIUM SERPL-MCNC: 8.8 MG/DL (ref 8.3–10.1)
CHLORIDE SERPL-SCNC: 109 MMOL/L (ref 100–108)
CHOLEST SERPL-MCNC: 184 MG/DL (ref 50–200)
CO2 SERPL-SCNC: 25 MMOL/L (ref 21–32)
CREAT SERPL-MCNC: 0.77 MG/DL (ref 0.6–1.3)
ERYTHROCYTE [DISTWIDTH] IN BLOOD BY AUTOMATED COUNT: 13.2 % (ref 11.6–15.1)
EST. AVERAGE GLUCOSE BLD GHB EST-MCNC: 131 MG/DL
GFR SERPL CREATININE-BSD FRML MDRD: 80 ML/MIN/1.73SQ M
GLUCOSE P FAST SERPL-MCNC: 99 MG/DL (ref 65–99)
HBA1C MFR BLD: 6.2 %
HCT VFR BLD AUTO: 44.4 % (ref 34.8–46.1)
HDLC SERPL-MCNC: 42 MG/DL
HGB BLD-MCNC: 14.4 G/DL (ref 11.5–15.4)
LDLC SERPL CALC-MCNC: 111 MG/DL (ref 0–100)
MCH RBC QN AUTO: 30.9 PG (ref 26.8–34.3)
MCHC RBC AUTO-ENTMCNC: 32.4 G/DL (ref 31.4–37.4)
MCV RBC AUTO: 95 FL (ref 82–98)
NONHDLC SERPL-MCNC: 142 MG/DL
PLATELET # BLD AUTO: 248 THOUSANDS/UL (ref 149–390)
PMV BLD AUTO: 9.6 FL (ref 8.9–12.7)
POTASSIUM SERPL-SCNC: 4.1 MMOL/L (ref 3.5–5.3)
PROT SERPL-MCNC: 8.2 G/DL (ref 6.4–8.2)
RBC # BLD AUTO: 4.66 MILLION/UL (ref 3.81–5.12)
SODIUM SERPL-SCNC: 140 MMOL/L (ref 136–145)
TRIGL SERPL-MCNC: 153 MG/DL
TSH SERPL DL<=0.05 MIU/L-ACNC: 1.49 UIU/ML (ref 0.36–3.74)
WBC # BLD AUTO: 4.52 THOUSAND/UL (ref 4.31–10.16)

## 2021-08-27 PROCEDURE — 85027 COMPLETE CBC AUTOMATED: CPT

## 2021-08-27 PROCEDURE — 80053 COMPREHEN METABOLIC PANEL: CPT

## 2021-08-27 PROCEDURE — 36415 COLL VENOUS BLD VENIPUNCTURE: CPT

## 2021-08-27 PROCEDURE — 80061 LIPID PANEL: CPT

## 2021-08-27 PROCEDURE — 84443 ASSAY THYROID STIM HORMONE: CPT

## 2021-08-27 PROCEDURE — 83036 HEMOGLOBIN GLYCOSYLATED A1C: CPT

## 2021-08-27 NOTE — TELEPHONE ENCOUNTER
----- Message from Eric Bautista MD sent at 9/18/2052  4:33 PM EDT -----   All recent blood work stable for patient    Latest A1c was 6 2

## 2021-08-31 DIAGNOSIS — E03.9 HYPOTHYROIDISM, UNSPECIFIED TYPE: ICD-10-CM

## 2021-08-31 RX ORDER — LEVOTHYROXINE SODIUM 0.03 MG/1
TABLET ORAL
Qty: 116 TABLET | Refills: 1 | Status: SHIPPED | OUTPATIENT
Start: 2021-08-31 | End: 2022-02-10 | Stop reason: SDUPTHER

## 2021-09-15 DIAGNOSIS — E78.5 HYPERLIPIDEMIA, UNSPECIFIED HYPERLIPIDEMIA TYPE: ICD-10-CM

## 2021-09-15 RX ORDER — SIMVASTATIN 20 MG
20 TABLET ORAL DAILY
Qty: 90 TABLET | Refills: 3 | Status: SHIPPED | OUTPATIENT
Start: 2021-09-15 | End: 2021-12-20

## 2021-12-06 ENCOUNTER — OFFICE VISIT (OUTPATIENT)
Dept: SLEEP CENTER | Facility: CLINIC | Age: 66
End: 2021-12-06
Payer: MEDICARE

## 2021-12-06 VITALS
WEIGHT: 194.2 LBS | HEIGHT: 61 IN | BODY MASS INDEX: 36.67 KG/M2 | HEART RATE: 78 BPM | DIASTOLIC BLOOD PRESSURE: 72 MMHG | SYSTOLIC BLOOD PRESSURE: 120 MMHG

## 2021-12-06 DIAGNOSIS — E66.9 OBESITY (BMI 30-39.9): ICD-10-CM

## 2021-12-06 DIAGNOSIS — J31.0 CHRONIC RHINITIS: ICD-10-CM

## 2021-12-06 DIAGNOSIS — F51.12 INSUFFICIENT SLEEP SYNDROME: ICD-10-CM

## 2021-12-06 DIAGNOSIS — J45.20 MILD INTERMITTENT ASTHMA WITHOUT COMPLICATION: ICD-10-CM

## 2021-12-06 DIAGNOSIS — K21.9 GASTROESOPHAGEAL REFLUX DISEASE WITHOUT ESOPHAGITIS: ICD-10-CM

## 2021-12-06 DIAGNOSIS — G47.33 OSA (OBSTRUCTIVE SLEEP APNEA): Primary | ICD-10-CM

## 2021-12-06 PROCEDURE — 99214 OFFICE O/P EST MOD 30 MIN: CPT | Performed by: INTERNAL MEDICINE

## 2021-12-07 ENCOUNTER — TELEPHONE (OUTPATIENT)
Dept: SLEEP CENTER | Facility: CLINIC | Age: 66
End: 2021-12-07

## 2021-12-20 DIAGNOSIS — E78.5 HYPERLIPIDEMIA, UNSPECIFIED HYPERLIPIDEMIA TYPE: ICD-10-CM

## 2021-12-20 RX ORDER — SIMVASTATIN 20 MG
TABLET ORAL
Qty: 90 TABLET | Refills: 3 | Status: SHIPPED | OUTPATIENT
Start: 2021-12-20 | End: 2022-02-10 | Stop reason: SDUPTHER

## 2021-12-27 DIAGNOSIS — K21.9 GASTROESOPHAGEAL REFLUX DISEASE WITHOUT ESOPHAGITIS: ICD-10-CM

## 2021-12-27 RX ORDER — PANTOPRAZOLE SODIUM 40 MG/1
TABLET, DELAYED RELEASE ORAL
Qty: 90 TABLET | Refills: 1 | Status: SHIPPED | OUTPATIENT
Start: 2021-12-27 | End: 2022-03-11

## 2022-01-28 NOTE — PATIENT INSTRUCTIONS
Nursing Support:  When: Monday through Friday 7A-5PM except holidays  Where: Our direct line is [phone]  If you are having a true emergency please call 911  In the event that the line is busy or it is after hours please leave a voice message and we will return your call  Please speak clearly, leaving your full name, birth date, best number to reach you and the reason for your call  Medication refills: We will need the name of the medication, the dosage, the ordering provider, whether you get a 30 or 90 day refill, and the pharmacy name and address  Medications will be ordered by the provider only  Nurses cannot call in prescriptions  Please allow 7 days for medication refills  Physician requested updates: If your provider requested that you call with an update after starting medication, please be ready to provide us the medication and dosage, what time you take your medication, the time you attempt to fall asleep, time you fall asleep, when you wake up, and what time you get out of bed  Sleep Study Results: We will contact you with sleep study results and/or next steps after the physician has reviewed your testing  Returned phone call to pt to get clarification on forms that are being requested. HIPAA verified by two patient identifiers. Pt was unclear of what is being requested. I have the number to [facility] to see what exactly is being requested. I advised pt that if I needed any other information I will call him back, verified understanding.      [facility]: [phone]

## 2022-02-10 ENCOUNTER — OFFICE VISIT (OUTPATIENT)
Dept: FAMILY MEDICINE CLINIC | Facility: CLINIC | Age: 67
End: 2022-02-10
Payer: MEDICARE

## 2022-02-10 VITALS
TEMPERATURE: 97.6 F | HEIGHT: 61 IN | BODY MASS INDEX: 36.53 KG/M2 | WEIGHT: 193.5 LBS | SYSTOLIC BLOOD PRESSURE: 130 MMHG | RESPIRATION RATE: 18 BRPM | OXYGEN SATURATION: 97 % | DIASTOLIC BLOOD PRESSURE: 74 MMHG

## 2022-02-10 DIAGNOSIS — R73.9 HYPERGLYCEMIA: ICD-10-CM

## 2022-02-10 DIAGNOSIS — E03.9 HYPOTHYROIDISM, UNSPECIFIED TYPE: Primary | ICD-10-CM

## 2022-02-10 DIAGNOSIS — L29.9 PRURITUS: ICD-10-CM

## 2022-02-10 DIAGNOSIS — E78.5 HYPERLIPIDEMIA, UNSPECIFIED HYPERLIPIDEMIA TYPE: ICD-10-CM

## 2022-02-10 DIAGNOSIS — E11.8 TYPE 2 DIABETES MELLITUS WITH COMPLICATION, WITHOUT LONG-TERM CURRENT USE OF INSULIN (HCC): ICD-10-CM

## 2022-02-10 DIAGNOSIS — E66.01 OBESITY, MORBID (HCC): ICD-10-CM

## 2022-02-10 PROBLEM — Z01.818 PREOPERATIVE EVALUATION OF A MEDICAL CONDITION TO RULE OUT SURGICAL CONTRAINDICATIONS (TAR REQUIRED): Status: RESOLVED | Noted: 2020-06-01 | Resolved: 2022-02-10

## 2022-02-10 PROBLEM — R05.9 COUGH: Status: RESOLVED | Noted: 2021-08-19 | Resolved: 2022-02-10

## 2022-02-10 PROCEDURE — 99213 OFFICE O/P EST LOW 20 MIN: CPT | Performed by: FAMILY MEDICINE

## 2022-02-10 RX ORDER — LEVOTHYROXINE SODIUM 0.03 MG/1
TABLET ORAL
Qty: 116 TABLET | Refills: 3 | Status: SHIPPED | OUTPATIENT
Start: 2022-02-10

## 2022-02-10 RX ORDER — SIMVASTATIN 20 MG
20 TABLET ORAL DAILY
Qty: 90 TABLET | Refills: 3 | Status: SHIPPED | OUTPATIENT
Start: 2022-02-10

## 2022-02-10 NOTE — ASSESSMENT & PLAN NOTE
Pruritus    Patient will continue to see dermatologist   We discussed the possibility of trying something for her stress levels such as lorazepam if no other treatment options are successful at alleviating her pruritus

## 2022-02-10 NOTE — PROGRESS NOTES
FAMILY PRACTICE OFFICE VISIT       NAME: Cynthia Reyes  AGE: 78 y o  SEX: female       : 1955        MRN: [id number]    DATE: 2/10/2022  TIME: 12:13 PM    Assessment and Plan     Problem List Items Addressed This Visit        Endocrine    Hypothyroidism - Primary     Hypothyroidism  TSH is stable on current dose of levothyroxine         Relevant Medications    levothyroxine 25 mcg tablet       Musculoskeletal and Integument    Pruritus     Pruritus  Patient will continue to see dermatologist   We discussed the possibility of trying something for her stress levels such as lorazepam if no other treatment options are successful at alleviating her pruritus            Other    Hyperlipidemia     Hyperlipidemia  Lipid panel stable on current dose of statin therapy         Relevant Medications    simvastatin (ZOCOR) 20 mg tablet    Hyperglycemia    Obesity, morbid ([address] )      Other Visit Diagnoses     Type 2 diabetes mellitus with complication, without long-term current use of insulin [facility])                  Chief Complaint     Chief Complaint   Patient presents with    Follow-up     6 month        History of Present Illness     Patient the office to review chronic medical condition  Patient states she has had increased stressors with her  recently having a stroke and will be coming home from the hospital soon  She continues to see dermatologist for ongoing pruritus of bilateral arms  Review of Systems   Review of Systems   Constitutional: Negative  HENT: Negative  Eyes: Negative  Respiratory: Negative  Cardiovascular: Negative  Gastrointestinal: Negative  Genitourinary: Negative  Musculoskeletal: Negative  Skin:        As per HPI   Neurological: Negative      Psychiatric/Behavioral:        As per HPI       Active Problem List     Patient Active Problem List   Diagnosis    Hyperlipidemia    Esophagitis, reflux    Hypothyroidism    EDEL (obstructive sleep apnea)    Vitamin D deficiency    Insufficient sleep syndrome    Chronic rhinitis    Mild intermittent asthma without complication    Gastroesophageal reflux disease without esophagitis    Obesity (BMI 30-39  9)    Palpitations    Hyperglycemia    Paresthesia    Pruritus    Obesity, morbid (HCC)    Chronic bilateral thoracic back pain       Past Medical History:  Past Medical History:   Diagnosis Date    Asthma     Glaucoma        Past Surgical History:  Past Surgical History:   Procedure Laterality Date    EYE SURGERY Left 06/08/2020     macular gap - 06/08/2020 & 11/02/2020       Family History:  Family History   Problem Relation Age of Onset    Heart disease Mother     Other Maternal Grandmother         Thyroid disorder    No Known Problems Maternal Grandfather     No Known Problems Paternal Grandmother     No Known Problems Paternal Grandfather     No Known Problems Father     No Known Problems Son     No Known Problems Son     No Known Problems Son     No Known Problems Brother     No Known Problems Brother     No Known Problems Maternal Aunt     Breast cancer Paternal Aunt         52's    Breast cancer Cousin         age unknown, 42's    Breast cancer Cousin         early 52's       Social History:  Social History     Socioeconomic History    Marital status: /Civil Union     Spouse name: Not on file    Number of children: Not on file    Years of education: Not on file    Highest education level: Not on file   Occupational History    Not on file   Tobacco Use    Smoking status: Never Smoker    Smokeless tobacco: Never Used   Vaping Use    Vaping Use: Never used   Substance and Sexual Activity    Alcohol use: Yes     Comment: social    Drug use: No    Sexual activity: Not Currently   Other Topics Concern    Not on file   Social History Narrative    Not on file     Social Determinants of Health     Financial Resource Strain: Not on file   Food Insecurity: Not on file Transportation Needs: Not on file   Physical Activity: Not on file   Stress: Not on file   Social Connections: Not on file   Intimate Partner Violence: Not on file   Housing Stability: Not on file       Objective     Vitals:    02/10/22 1103   BP: 130/74   Resp: 18   Temp: 97 6 °F (36 4 °C)   SpO2: 97%     Wt Readings from Last 3 Encounters:   02/10/22 87 8 kg (193 lb 8 oz)   12/06/21 88 1 kg (194 lb 3 2 oz)   08/19/21 88 9 kg (196 lb)       Physical Exam  Constitutional:       General: She is not in acute distress  Appearance: Normal appearance  She is not ill-appearing  HENT:      Head: Normocephalic and atraumatic  Eyes:      General:         Right eye: No discharge  Left eye: No discharge  Conjunctiva/sclera: Conjunctivae normal       Pupils: Pupils are equal, round, and reactive to light  Neck:      Vascular: No carotid bruit  Cardiovascular:      Rate and Rhythm: Normal rate and regular rhythm  Heart sounds: Normal heart sounds  No murmur heard  Pulmonary:      Effort: Pulmonary effort is normal       Breath sounds: Normal breath sounds  No wheezing, rhonchi or rales  Musculoskeletal:      Right lower leg: No edema  Left lower leg: No edema  Lymphadenopathy:      Cervical: No cervical adenopathy  Skin:     Findings: No rash  Neurological:      General: No focal deficit present  Mental Status: She is alert and oriented to person, place, and time  Cranial Nerves: No cranial nerve deficit  Psychiatric:         Mood and Affect: Mood normal          Behavior: Behavior normal          Thought Content:  Thought content normal          Judgment: Judgment normal          Pertinent Laboratory/Diagnostic Studies:  Lab Results   Component Value Date    GLUCOSE 106 02/27/2015    BUN 15 08/27/2021    CREATININE 0 77 08/27/2021    CALCIUM 8 8 08/27/2021     11/02/2017    K 4 1 08/27/2021    CO2 25 08/27/2021     (H) 08/27/2021     Lab Results Component Value Date    ALT 51 08/27/2021    AST 33 08/27/2021    ALKPHOS 80 08/27/2021    BILITOT 0 6 11/02/2017       Lab Results   Component Value Date    WBC 4 52 08/27/2021    HGB 14 4 08/27/2021    HCT 44 4 08/27/2021    MCV 95 08/27/2021     08/27/2021       Lab Results   Component Value Date    TSH 2 04 06/26/2019       Lab Results   Component Value Date    CHOL 189 11/02/2017     Lab Results   Component Value Date    TRIG 153 (H) 08/27/2021     Lab Results   Component Value Date    HDL 42 08/27/2021     Lab Results   Component Value Date    LDLCALC 111 (H) 08/27/2021     Lab Results   Component Value Date    HGBA1C 6 2 (H) 08/27/2021       Results for orders placed or performed in visit on 08/27/21   Hemoglobin A1C   Result Value Ref Range    Hemoglobin A1C 6 2 (H) Normal 3 8-5 6%; PreDiabetic 5 7-6 4%;  Diabetic >=6 5%; Glycemic control for adults with diabetes <7 0% %     mg/dl   CBC   Result Value Ref Range    WBC 4 52 4 31 - 10 16 Thousand/uL    RBC 4 66 3 81 - 5 12 Million/uL    Hemoglobin 14 4 11 5 - 15 4 g/dL    Hematocrit 44 4 34 8 - 46 1 %    MCV 95 82 - 98 fL    MCH 30 9 26 8 - 34 3 pg    MCHC 32 4 31 4 - 37 4 g/dL    RDW 13 2 11 6 - 15 1 %    Platelets 840 867 - 108 Thousands/uL    MPV 9 6 8 9 - 12 7 fL   Comprehensive metabolic panel   Result Value Ref Range    Sodium 140 136 - 145 mmol/L    Potassium 4 1 3 5 - 5 3 mmol/L    Chloride 109 (H) 100 - 108 mmol/L    CO2 25 21 - 32 mmol/L    ANION GAP 6 4 - 13 mmol/L    BUN 15 5 - 25 mg/dL    Creatinine 0 77 0 60 - 1 30 mg/dL    Glucose, Fasting 99 65 - 99 mg/dL    Calcium 8 8 8 3 - 10 1 mg/dL    AST 33 5 - 45 U/L    ALT 51 12 - 78 U/L    Alkaline Phosphatase 80 46 - 116 U/L    Total Protein 8 2 6 4 - 8 2 g/dL    Albumin 3 5 3 5 - 5 0 g/dL    Total Bilirubin 0 47 0 20 - 1 00 mg/dL    eGFR 80 ml/min/1 73sq m   Lipid panel   Result Value Ref Range    Cholesterol 184 50 - 200 mg/dL    Triglycerides 153 (H) <=150 mg/dL    HDL, Direct 42 >=40 mg/dL    LDL Calculated 111 (H) 0 - 100 mg/dL    Non-HDL-Chol (CHOL-HDL) 142 mg/dl   TSH, 3rd generation   Result Value Ref Range    TSH 3RD GENERATON 1 490 0 358 - 3 740 uIU/mL       No orders of the defined types were placed in this encounter  ALLERGIES:  Allergies   Allergen Reactions    Bee Venom Anaphylaxis     Hornets, wasp, and bees    Codeine Nausea Only and Vomiting     Reaction Date: 24Aug2011;     Other      Narcotic ---nausea and vomiting     Oxycodone-Acetaminophen Nausea Only and Vomiting     Reaction Date: 24Aug2011;     Vicodin  [Hydrocodone-Acetaminophen] Nausea Only and Vomiting     Reaction Date: 24Aug2011;     Sulfa Antibiotics Rash       Current Medications     Current Outpatient Medications   Medication Sig Dispense Refill    Biotin 1000 MCG tablet Take 1,000 mcg by mouth daily      budesonide-formoterol (SYMBICORT) 160-4 5 mcg/act inhaler Inhale daily       Cholecalciferol ([employer] VITAMIN D) 2000 units CAPS Take 1 capsule by mouth daily      co-enzyme Q-10 30 MG capsule Take 30 mg by mouth 3 (three) times a day      EPINEPHrine (EPIPEN 2-CHEIKH) 0 3 mg/0 3 mL SOAJ Inject 0 3 mL as directed      fluticasone (FLONASE) 50 mcg/act nasal spray 2 sprays into each nostril daily      levothyroxine 25 mcg tablet One tablet Mon-Fri, Two tablets Sat-Sun  116 tablet 3    loratadine (CLARITIN) 10 mg tablet Take 1 tablet by mouth daily      Multiple Vitamin (MULTI-VITAMIN DAILY PO) Take 1 tablet by mouth daily      Omega-3 Fatty Acids (EQL OMEGA 3 FISH OIL) 1000 MG CAPS Take 1 capsule by mouth daily      pantoprazole (PROTONIX) 40 mg tablet TAKE 1 TABLET BY MOUTH DAILY  90 tablet 1    simvastatin (ZOCOR) 20 mg tablet Take 1 tablet (20 mg total) by mouth daily 90 tablet 3     No current facility-administered medications for this visit           Health Maintenance     Health Maintenance   Topic Date Due    Hepatitis C Screening  Never done    DTaP,Tdap,and Td Vaccines (1 - Tdap) 06/02/1999    COVID-19 Vaccine (3 - Booster) 08/23/2021    Fall Risk  08/19/2022    Depression Screening  08/19/2022    Medicare Annual Wellness Visit (AWV)  08/19/2022    BMI: Followup Plan  08/19/2022    BMI: Adult  02/10/2023    Breast Cancer Screening: Mammogram  06/01/2023    Colorectal Cancer Screening  12/20/2023    Osteoporosis Screening  Completed    Pneumococcal Vaccine: 65+ Years  Completed    Influenza Vaccine  Completed    HIB Vaccine  Aged Out    Hepatitis B Vaccine  Aged Out    IPV Vaccine  Aged Out    Hepatitis A Vaccine  Aged Out    Meningococcal ACWY Vaccine  Aged Out    HPV Vaccine  Aged Out     Immunization History   Administered Date(s) Administered    COVID-19 MODERNA VACC 0 5 ML IM 02/23/2021, 03/23/2021    COVID-19, unspecified 03/20/2021    H1N1, All Formulations 10/30/2009    INFLUENZA 10/01/2015, 10/04/2016, 10/13/2021    Influenza, high dose seasonal 0 7 mL 09/04/2020    Influenza, seasonal, injectable 10/01/2008, 10/01/2016, 10/02/2017    Influenza, seasonal, injectable, preservative free 10/01/2018    Pneumococcal Conjugate 13-Valent 09/04/2020    Pneumococcal Polysaccharide PPV23 11/20/2019    Td (adult), adsorbed 06/01/1999    Zoster 04/30/2014, 38/89/5499       Alice nA MD

## 2022-03-11 DIAGNOSIS — K21.9 GASTROESOPHAGEAL REFLUX DISEASE WITHOUT ESOPHAGITIS: ICD-10-CM

## 2022-03-11 RX ORDER — PANTOPRAZOLE SODIUM 40 MG/1
TABLET, DELAYED RELEASE ORAL
Qty: 90 TABLET | Refills: 1 | Status: SHIPPED | OUTPATIENT
Start: 2022-03-11

## 2022-05-12 NOTE — RESULT NOTES
Caleb Jimenez will need the following immunizations;Hib vaccine 2 of 4, polio vaccine 2 of 4, DTaP vaccine second dose, hepatitis B vaccine the third of a 3 dose series. Pneumococcal vaccine second dose. Please give this to the health department so they know which vaccines to get, they can call our office if there is any question. Child's Well Visit, 9 to 10 Months: Care Instructions  Your Care Instructions     Most babies at 5to 5 months of age are exploring the world around them. Your baby is familiar with you and with people who are often around them. Babies atthis age [de-identified] show fear of strangers. At this age, your child may stand up by pulling on furniture. Your child may wave bye-bye or play pat-a-cake or peekaboo. And your child may point withfingers and try to eat without your help. Follow-up care is a key part of your child's treatment and safety. Be sure to make and go to all appointments, and call your doctor if your child is having problems. It's also a good idea to know your child's test results andkeep a list of the medicines your child takes. How can you care for your child at home? Feeding   Keep breastfeeding for at least 12 months.  If you do not breastfeed, give your child a formula with iron.  Starting at 12 months, your child can begin to drink whole cow's milk or full-fat soy milk instead of formula. Whole milk provides fat calories that your child needs. If your child age 3 to 2 years has a family history of heart disease or obesity, reduced-fat (2%) soy or cow's milk may be okay. Ask your doctor what is best for your child. You can give your child nonfat or low-fat milk when they are 3years old.  Offer healthy foods each day, such as fruits, well-cooked vegetables, whole-grain cereal, yogurt, cheese, whole-grain breads, crackers, lean meat, fish, and tofu. It is okay if your child does not want to eat all of them.  Do not let your child eat while walking around.  Make sure your Discussion/Summary   Chest x-ray was read as normal     Verified Results  * XR CHEST PA & LATERAL [id number] Orlin Tucker Order Number: [id number]     Test Name Result Flag Reference   XR CHEST PA & LATERAL (Report)     CHEST      INDICATION: Dyspnea     COMPARISON: 10/11/2010     VIEWS: Frontal and lateral projections     IMAGES: 2     FINDINGS:        Cardiomediastinal silhouette appears unremarkable  The lungs are clear  No pneumothorax or pleural effusion  Visualized osseous structures appear within normal limits for the patient's age  IMPRESSION:     No active pulmonary disease         Workstation performed: [id number]     Signed by:   Rosalie Gomez MD   11/28/17 child sits down to eat. Do not give your child foods that may cause choking, such as nuts, whole grapes, hard or sticky candy, hot dogs, or popcorn.  Let your baby decide how much to eat.  Offer water when your child is thirsty. Juice does not have the valuable fiber that whole fruit has. Do not give your baby soda pop, juice, fast food, or sweets. Healthy habits   Do not put your child to bed with a bottle. This can cause tooth decay.  Brush your child's teeth every day. Use a tiny amount of toothpaste with fluoride (the size of a grain of rice).  Take your child out for walks.  Put a broad-spectrum sunscreen (SPF 30 or higher) on your child before taking them outside. Use a broad-brimmed hat to shade the ears, nose, and lips.  Shoes protect your child's feet. Be sure to have shoes that fit well.  Do not smoke or allow others to smoke around your child. Smoking around your child increases the child's risk for ear infections, asthma, colds, and pneumonia. If you need help quitting, talk to your doctor about stop-smoking programs and medicines. These can increase your chances of quitting for good. Immunizations  Make sure that your baby gets all the recommended childhood vaccines, whichhelp keep your baby healthy and prevent the spread of disease. Safety   Use a car seat for every ride. Install it properly in the back seat facing backward. For questions about car seats, call the [employer] at 0-657.513.1563.  Have safety sol at the top and bottom of stairs.  Learn what to do if your child is choking.  Keep cords out of your child's reach.  Watch your child at all times when near water, including pools, hot tubs, and bathtubs.  Keep the number for Poison Control (9-929.123.7414) in or near your phone.  Tell your doctor if your child spends a lot of time in a house built before 1978.  The paint may have lead in it, which can be harmful. Parenting   Read stories to your child every day.  Play games, talk, and sing to your child every day. Give your child love and attention.  Teach good behavior by praising your child when they are being good. Use your body language, such as looking sad or taking your child out of danger, to let your child know you do not like their behavior. Do not yell or spank. When should you call for help? Watch closely for changes in your child's health, and be sure to contact your doctor if:     You are concerned that your child is not growing or developing normally.      You are worried about your child's behavior.      You need more information about how to care for your child, or you have questions or concerns. Where can you learn more? Go to https://[email or web address].365webcall. org and sign in to your [employer] account. Enter G850 in the [facility] box to learn more about \"Child's Well Visit, 9 to 10 Months: Care Instructions. \"     If you do not have an account, please click on the \"Sign Up Now\" link. Current as of: September 20, 2021               Content Version: 13.2  © 0896-7605 [employer]. Care instructions adapted under license by [facility] ([facility]). If you have questions about a medical condition or this instruction, always ask your healthcare professional. [address] any warranty or liability for your use of this information.

## 2022-06-18 ENCOUNTER — APPOINTMENT (OUTPATIENT)
Dept: LAB | Facility: CLINIC | Age: 67
End: 2022-06-18
Payer: MEDICARE

## 2022-06-18 ENCOUNTER — OFFICE VISIT (OUTPATIENT)
Dept: FAMILY MEDICINE CLINIC | Facility: CLINIC | Age: 67
End: 2022-06-18
Payer: MEDICARE

## 2022-06-18 VITALS
TEMPERATURE: 97.5 F | DIASTOLIC BLOOD PRESSURE: 60 MMHG | BODY MASS INDEX: 35.52 KG/M2 | OXYGEN SATURATION: 95 % | SYSTOLIC BLOOD PRESSURE: 144 MMHG | HEIGHT: 61 IN | WEIGHT: 188.13 LBS | HEART RATE: 65 BPM | RESPIRATION RATE: 18 BRPM

## 2022-06-18 DIAGNOSIS — R73.9 HYPERGLYCEMIA: ICD-10-CM

## 2022-06-18 DIAGNOSIS — R23.2 HOT FLASHES: ICD-10-CM

## 2022-06-18 DIAGNOSIS — E03.9 HYPOTHYROIDISM, UNSPECIFIED TYPE: ICD-10-CM

## 2022-06-18 DIAGNOSIS — E03.9 HYPOTHYROIDISM, UNSPECIFIED TYPE: Primary | ICD-10-CM

## 2022-06-18 DIAGNOSIS — E78.5 HYPERLIPIDEMIA, UNSPECIFIED HYPERLIPIDEMIA TYPE: ICD-10-CM

## 2022-06-18 LAB
ALBUMIN SERPL BCP-MCNC: 3.6 G/DL (ref 3.5–5)
ALP SERPL-CCNC: 70 U/L (ref 46–116)
ALT SERPL W P-5'-P-CCNC: 38 U/L (ref 12–78)
ANION GAP SERPL CALCULATED.3IONS-SCNC: 7 MMOL/L (ref 4–13)
AST SERPL W P-5'-P-CCNC: 24 U/L (ref 5–45)
BILIRUB SERPL-MCNC: 0.54 MG/DL (ref 0.2–1)
BUN SERPL-MCNC: 16 MG/DL (ref 5–25)
CALCIUM SERPL-MCNC: 8.9 MG/DL (ref 8.3–10.1)
CHLORIDE SERPL-SCNC: 107 MMOL/L (ref 100–108)
CHOLEST SERPL-MCNC: 178 MG/DL
CO2 SERPL-SCNC: 25 MMOL/L (ref 21–32)
CREAT SERPL-MCNC: 0.78 MG/DL (ref 0.6–1.3)
ERYTHROCYTE [DISTWIDTH] IN BLOOD BY AUTOMATED COUNT: 13 % (ref 11.6–15.1)
ERYTHROCYTE [SEDIMENTATION RATE] IN BLOOD: 37 MM/HOUR (ref 0–29)
EST. AVERAGE GLUCOSE BLD GHB EST-MCNC: 126 MG/DL
GFR SERPL CREATININE-BSD FRML MDRD: 78 ML/MIN/1.73SQ M
GLUCOSE P FAST SERPL-MCNC: 99 MG/DL (ref 65–99)
HBA1C MFR BLD: 6 %
HCT VFR BLD AUTO: 45 % (ref 34.8–46.1)
HDLC SERPL-MCNC: 40 MG/DL
HGB BLD-MCNC: 14.3 G/DL (ref 11.5–15.4)
LDLC SERPL CALC-MCNC: 109 MG/DL (ref 0–100)
MCH RBC QN AUTO: 30.3 PG (ref 26.8–34.3)
MCHC RBC AUTO-ENTMCNC: 31.8 G/DL (ref 31.4–37.4)
MCV RBC AUTO: 95 FL (ref 82–98)
NONHDLC SERPL-MCNC: 138 MG/DL
PLATELET # BLD AUTO: 250 THOUSANDS/UL (ref 149–390)
PMV BLD AUTO: 9.9 FL (ref 8.9–12.7)
POTASSIUM SERPL-SCNC: 4 MMOL/L (ref 3.5–5.3)
PROT SERPL-MCNC: 8.2 G/DL (ref 6.4–8.2)
RBC # BLD AUTO: 4.72 MILLION/UL (ref 3.81–5.12)
SODIUM SERPL-SCNC: 139 MMOL/L (ref 136–145)
TRIGL SERPL-MCNC: 144 MG/DL
TSH SERPL DL<=0.05 MIU/L-ACNC: 2.58 UIU/ML (ref 0.45–4.5)
WBC # BLD AUTO: 4.7 THOUSAND/UL (ref 4.31–10.16)

## 2022-06-18 PROCEDURE — 80053 COMPREHEN METABOLIC PANEL: CPT

## 2022-06-18 PROCEDURE — 83036 HEMOGLOBIN GLYCOSYLATED A1C: CPT

## 2022-06-18 PROCEDURE — 99214 OFFICE O/P EST MOD 30 MIN: CPT | Performed by: FAMILY MEDICINE

## 2022-06-18 PROCEDURE — 85652 RBC SED RATE AUTOMATED: CPT

## 2022-06-18 PROCEDURE — 85027 COMPLETE CBC AUTOMATED: CPT

## 2022-06-18 PROCEDURE — 36415 COLL VENOUS BLD VENIPUNCTURE: CPT

## 2022-06-18 PROCEDURE — 80061 LIPID PANEL: CPT

## 2022-06-18 PROCEDURE — 84443 ASSAY THYROID STIM HORMONE: CPT

## 2022-06-18 NOTE — PROGRESS NOTES
FAMILY PRACTICE OFFICE VISIT       NAME: Nahid Garcia  AGE: 75 y o  SEX: female       : 1955        MRN: [id number]    DATE: 2022  TIME: 9:42 AM    Assessment and Plan     Problem List Items Addressed This Visit        Endocrine    Hypothyroidism - Primary     Hypothyroidism  Patient will check TSH blood work and continue with current dose of levothyroxine  Relevant Orders    Hemoglobin A1C    CBC    Comprehensive metabolic panel    Lipid panel    TSH, 3rd generation    Sedimentation rate, automated       Cardiovascular and Mediastinum    Hot flashes     Hot flashes  Patient will check CBC, sed rate, TFT  We discussed the possibility of patient having excess stressors in her life which may be causing symptoms occur in the early mornings  We discussed the possibility of trying anti anxiety medication  Relevant Orders    Hemoglobin A1C    CBC    Comprehensive metabolic panel    Lipid panel    TSH, 3rd generation    Sedimentation rate, automated       Other    Hyperlipidemia     Hyperlipidemia  Patient will check lipid panel blood work and continue with current dose of statin therapy           Relevant Orders    Hemoglobin A1C    CBC    Comprehensive metabolic panel    Lipid panel    TSH, 3rd generation    Sedimentation rate, automated    Hyperglycemia     Hyperglycemia  Patient will check hemoglobin A1c  We will make further recommendations pending results of test            Relevant Orders    Hemoglobin A1C    CBC    Comprehensive metabolic panel    Lipid panel    TSH, 3rd generation    Sedimentation rate, automated              Chief Complaint     Chief Complaint   Patient presents with    Hot Flashes     2 -3 MONTH        History of Present Illness     Patient in the office with symptoms that began around 2022  She describes awakening on most mornings between 4-6 a m  with some mild shortness of breath and palpitations    She does uses CPAP machine for obstructive sleep apnea  Many times symptoms resolved within 30 minutes of lying in bed and she is able to fall back to sleep  She does report increased stressors with pending divorce of his son and daughter-in-law which she was told about in April 2022  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  who is in poor health and requires significant assistance with ADLs  Patient also takes care of all household chores  She denies any chest pain or shortness of breath during the day while she does laundry and going up and down stairs, washing dishes, helping to lift her   She is also experience intermittent hot flashes for which she saw gynecologist   Angelika Valle felt this was not related to hormonal condition but requested having her thyroid function tested once again  Review of Systems   Review of Systems   Constitutional: Negative  HENT: Negative  Respiratory: Negative  Cardiovascular: Positive for palpitations  Gastrointestinal: Negative  Endocrine: Positive for heat intolerance  Genitourinary: Negative  Musculoskeletal: Negative  Neurological: Negative  Active Problem List     Patient Active Problem List   Diagnosis    Hyperlipidemia    Esophagitis, reflux    Hypothyroidism    EDEL (obstructive sleep apnea)    Vitamin D deficiency    Insufficient sleep syndrome    Chronic rhinitis    Mild intermittent asthma without complication    Gastroesophageal reflux disease without esophagitis    Obesity (BMI 30-39  9)    Palpitations    Hyperglycemia    Paresthesia    Pruritus    Obesity, morbid (HCC)    Chronic bilateral thoracic back pain    Hot flashes       Past Medical History:  Past Medical History:   Diagnosis Date    Asthma     Glaucoma        Past Surgical History:  Past Surgical History:   Procedure Laterality Date    EYE SURGERY Left 06/08/2020     macular gap - 06/08/2020 & 11/02/2020       Family History:  Family History   Problem Relation Age of Onset    Heart disease Mother     Other Maternal Grandmother         Thyroid disorder    No Known Problems Maternal Grandfather     No Known Problems Paternal Grandmother     No Known Problems Paternal Grandfather     No Known Problems Father     No Known Problems Son     No Known Problems Son     No Known Problems Son     No Known Problems Brother     No Known Problems Brother     No Known Problems Maternal Aunt     Breast cancer Paternal Aunt         52's    Breast cancer Cousin         age unknown, 42's    Breast cancer Cousin         early 52's       Social History:  Social History     Socioeconomic History    Marital status: /Civil Union     Spouse name: Not on file    Number of children: Not on file    Years of education: Not on file    Highest education level: Not on file   Occupational History    Not on file   Tobacco Use    Smoking status: Never Smoker    Smokeless tobacco: Never Used   Vaping Use    Vaping Use: Never used   Substance and Sexual Activity    Alcohol use: Yes     Comment: social    Drug use: No    Sexual activity: Not Currently   Other Topics Concern    Not on file   Social History Narrative    Not on file     Social Determinants of Health     Financial Resource Strain: Not on file   Food Insecurity: Not on file   Transportation Needs: Not on file   Physical Activity: Not on file   Stress: Not on file   Social Connections: Not on file   Intimate Partner Violence: Not on file   Housing Stability: Not on file       Objective     Vitals:    06/18/22 0831   BP: 144/60   Pulse: 65   Resp: 18   Temp: 97 5 °F (36 4 °C)   SpO2: 95%     Wt Readings from Last 3 Encounters:   06/18/22 85 3 kg (188 lb 2 oz)   02/10/22 87 8 kg (193 lb 8 oz)   12/06/21 88 1 kg (194 lb 3 2 oz)       Physical Exam  Constitutional:       General: She is not in acute distress  Appearance: Normal appearance  She is not ill-appearing  HENT:      Head: Normocephalic and atraumatic  Eyes:      General:         Right eye: No discharge  Left eye: No discharge  Extraocular Movements: Extraocular movements intact  Conjunctiva/sclera: Conjunctivae normal       Pupils: Pupils are equal, round, and reactive to light  Neck:      Vascular: No carotid bruit  Cardiovascular:      Rate and Rhythm: Normal rate and regular rhythm  Heart sounds: Normal heart sounds  No murmur heard  Pulmonary:      Effort: Pulmonary effort is normal       Breath sounds: Normal breath sounds  No wheezing, rhonchi or rales  Abdominal:      General: Abdomen is flat  Bowel sounds are normal  There is no distension  Palpations: Abdomen is soft  Tenderness: There is no abdominal tenderness  There is no guarding or rebound  Musculoskeletal:      Right lower leg: No edema  Left lower leg: No edema  Lymphadenopathy:      Cervical: No cervical adenopathy  Skin:     Findings: No rash  Neurological:      General: No focal deficit present  Mental Status: She is alert and oriented to person, place, and time  Cranial Nerves: No cranial nerve deficit  Psychiatric:         Mood and Affect: Mood normal          Behavior: Behavior normal          Thought Content:  Thought content normal          Judgment: Judgment normal          Pertinent Laboratory/Diagnostic Studies:  Lab Results   Component Value Date    GLUCOSE 106 02/27/2015    BUN 15 08/27/2021    CREATININE 0 77 08/27/2021    CALCIUM 8 8 08/27/2021     11/02/2017    K 4 1 08/27/2021    CO2 25 08/27/2021     (H) 08/27/2021     Lab Results   Component Value Date    ALT 51 08/27/2021    AST 33 08/27/2021    ALKPHOS 80 08/27/2021    BILITOT 0 6 11/02/2017       Lab Results   Component Value Date    WBC 4 52 08/27/2021    HGB 14 4 08/27/2021    HCT 44 4 08/27/2021    MCV 95 08/27/2021     08/27/2021       Lab Results   Component Value Date    TSH 2 04 06/26/2019       Lab Results   Component Value Date CHOL 189 11/02/2017     Lab Results   Component Value Date    TRIG 153 (H) 08/27/2021     Lab Results   Component Value Date    HDL 42 08/27/2021     Lab Results   Component Value Date    LDLCALC 111 (H) 08/27/2021     Lab Results   Component Value Date    HGBA1C 6 2 (H) 08/27/2021       Results for orders placed or performed in visit on 08/27/21   Hemoglobin A1C   Result Value Ref Range    Hemoglobin A1C 6 2 (H) Normal 3 8-5 6%; PreDiabetic 5 7-6 4%;  Diabetic >=6 5%; Glycemic control for adults with diabetes <7 0% %     mg/dl   CBC   Result Value Ref Range    WBC 4 52 4 31 - 10 16 Thousand/uL    RBC 4 66 3 81 - 5 12 Million/uL    Hemoglobin 14 4 11 5 - 15 4 g/dL    Hematocrit 44 4 34 8 - 46 1 %    MCV 95 82 - 98 fL    MCH 30 9 26 8 - 34 3 pg    MCHC 32 4 31 4 - 37 4 g/dL    RDW 13 2 11 6 - 15 1 %    Platelets 957 880 - 750 Thousands/uL    MPV 9 6 8 9 - 12 7 fL   Comprehensive metabolic panel   Result Value Ref Range    Sodium 140 136 - 145 mmol/L    Potassium 4 1 3 5 - 5 3 mmol/L    Chloride 109 (H) 100 - 108 mmol/L    CO2 25 21 - 32 mmol/L    ANION GAP 6 4 - 13 mmol/L    BUN 15 5 - 25 mg/dL    Creatinine 0 77 0 60 - 1 30 mg/dL    Glucose, Fasting 99 65 - 99 mg/dL    Calcium 8 8 8 3 - 10 1 mg/dL    AST 33 5 - 45 U/L    ALT 51 12 - 78 U/L    Alkaline Phosphatase 80 46 - 116 U/L    Total Protein 8 2 6 4 - 8 2 g/dL    Albumin 3 5 3 5 - 5 0 g/dL    Total Bilirubin 0 47 0 20 - 1 00 mg/dL    eGFR 80 ml/min/1 73sq m   Lipid panel   Result Value Ref Range    Cholesterol 184 50 - 200 mg/dL    Triglycerides 153 (H) <=150 mg/dL    HDL, Direct 42 >=40 mg/dL    LDL Calculated 111 (H) 0 - 100 mg/dL    Non-HDL-Chol (CHOL-HDL) 142 mg/dl   TSH, 3rd generation   Result Value Ref Range    TSH 3RD GENERATON 1 490 0 358 - 3 740 uIU/mL       Orders Placed This Encounter   Procedures    Hemoglobin A1C    CBC    Comprehensive metabolic panel    Lipid panel    TSH, 3rd generation    Sedimentation rate, automated ALLERGIES:  Allergies   Allergen Reactions    Bee Venom Anaphylaxis     Hornets, wasp, and bees    Codeine Nausea Only and Vomiting     Reaction Date: 24Aug2011;     Other      Narcotic ---nausea and vomiting     Oxycodone-Acetaminophen Nausea Only and Vomiting     Reaction Date: 24Aug2011;     Vicodin  [Hydrocodone-Acetaminophen] Nausea Only and Vomiting     Reaction Date: 24Aug2011;     Sulfa Antibiotics Rash       Current Medications     Current Outpatient Medications   Medication Sig Dispense Refill    Biotin 1000 MCG tablet Take 1,000 mcg by mouth daily      budesonide-formoterol (SYMBICORT) 160-4 5 mcg/act inhaler Inhale daily       Cholecalciferol 50 MCG (2000 UT) CAPS Take 1 capsule by mouth daily      co-enzyme Q-10 30 MG capsule Take 30 mg by mouth 3 (three) times a day      EPINEPHrine (EPIPEN) 0 3 mg/0 3 mL SOAJ Inject 0 3 mL as directed      fluticasone (FLONASE) 50 mcg/act nasal spray 2 sprays into each nostril daily      levothyroxine 25 mcg tablet One tablet Mon-Fri, Two tablets Sat-Sun  116 tablet 3    loratadine (CLARITIN) 10 mg tablet Take 1 tablet by mouth daily      Multiple Vitamin (MULTI-VITAMIN DAILY PO) Take 1 tablet by mouth daily      Omega-3 Fatty Acids (EQL OMEGA 3 FISH OIL) 1000 MG CAPS Take 1 capsule by mouth daily      pantoprazole (PROTONIX) 40 mg tablet TAKE 1 TABLET DAILY 90 tablet 1    simvastatin (ZOCOR) 20 mg tablet Take 1 tablet (20 mg total) by mouth daily 90 tablet 3     No current facility-administered medications for this visit           Health Maintenance     Health Maintenance   Topic Date Due    Hepatitis C Screening  Never done    DTaP,Tdap,and Td Vaccines (1 - Tdap) 06/02/1999    Medicare Annual Wellness Visit (AWV)  08/19/2022    BMI: Followup Plan  08/19/2022    Breast Cancer Screening: Mammogram  06/01/2023    Fall Risk  06/18/2023    Depression Screening  06/18/2023    BMI: Adult  06/18/2023    Colorectal Cancer Screening  12/20/2023  Osteoporosis Screening  Completed    Pneumococcal Vaccine: 65+ Years  Completed    Influenza Vaccine  Completed    COVID-19 Vaccine  Completed    HIB Vaccine  Aged Out    Hepatitis B Vaccine  Aged Out    IPV Vaccine  Aged Out    Hepatitis A Vaccine  Aged Out    Meningococcal ACWY Vaccine  Aged Out    HPV Vaccine  Aged Out     Immunization History   Administered Date(s) Administered    COVID-19 MODERNA VACC 0 5 ML IM 02/23/2021, 03/23/2021, 04/22/2022    COVID-19, unspecified 03/20/2021    H1N1, All Formulations 10/30/2009    INFLUENZA 10/01/2015, 10/04/2016, 10/13/2021    Influenza, high dose seasonal 0 7 mL 09/04/2020    Influenza, seasonal, injectable 10/01/2008, 10/01/2016, 10/02/2017    Influenza, seasonal, injectable, preservative free 10/01/2018    Pneumococcal Conjugate 13-Valent 09/04/2020    Pneumococcal Polysaccharide PPV23 11/20/2019    Td (adult), adsorbed 06/01/1999    Zoster 04/30/2014, 99/56/6138       Feng Sorensen MD    I spent 25 minutes with this patient which greater than 50% was spent counseling reviewing chart

## 2022-06-18 NOTE — ASSESSMENT & PLAN NOTE
Hot flashes  Patient will check CBC, sed rate, TFT  We discussed the possibility of patient having excess stressors in her life which may be causing symptoms occur in the early mornings  We discussed the possibility of trying anti anxiety medication

## 2022-06-18 NOTE — ASSESSMENT & PLAN NOTE
Hyperglycemia  Patient will check hemoglobin A1c    We will make further recommendations pending results of test

## 2022-06-20 ENCOUNTER — HOSPITAL ENCOUNTER (OUTPATIENT)
Dept: MAMMOGRAPHY | Facility: HOSPITAL | Age: 67
Discharge: HOME | End: 2022-06-20
Payer: MEDICARE

## 2022-06-20 VITALS — HEIGHT: 61 IN | BODY MASS INDEX: 35.5 KG/M2 | WEIGHT: 188.05 LBS

## 2022-06-20 DIAGNOSIS — Z12.31 ENCOUNTER FOR SCREENING MAMMOGRAM FOR MALIGNANT NEOPLASM OF BREAST: ICD-10-CM

## 2022-06-20 PROCEDURE — 77067 SCR MAMMO BI INCL CAD: CPT

## 2022-06-20 PROCEDURE — 77063 BREAST TOMOSYNTHESIS BI: CPT

## 2022-08-10 ENCOUNTER — RA CDI HCC (OUTPATIENT)
Dept: OTHER | Facility: HOSPITAL | Age: 67
End: 2022-08-10

## 2022-08-10 NOTE — PROGRESS NOTES
[address]  coding opportunities       Chart reviewed, no opportunity found: CHART REVIEWED, NO OPPORTUNITY FOUND        Patients Insurance     Medicare Insurance: Medicare None known

## 2022-08-18 ENCOUNTER — TELEMEDICINE (OUTPATIENT)
Dept: FAMILY MEDICINE CLINIC | Facility: CLINIC | Age: 67
End: 2022-08-18
Payer: MEDICARE

## 2022-08-18 VITALS
BODY MASS INDEX: 34.74 KG/M2 | SYSTOLIC BLOOD PRESSURE: 117 MMHG | HEIGHT: 61 IN | DIASTOLIC BLOOD PRESSURE: 72 MMHG | WEIGHT: 184 LBS | TEMPERATURE: 98.5 F

## 2022-08-18 DIAGNOSIS — U07.1 COVID-19 VIRUS INFECTION: Primary | ICD-10-CM

## 2022-08-18 PROCEDURE — G0439 PPPS, SUBSEQ VISIT: HCPCS | Performed by: FAMILY MEDICINE

## 2022-08-18 PROCEDURE — 99213 OFFICE O/P EST LOW 20 MIN: CPT | Performed by: FAMILY MEDICINE

## 2022-08-18 RX ORDER — NIRMATRELVIR AND RITONAVIR 300-100 MG
3 KIT ORAL 2 TIMES DAILY
Qty: 30 TABLET | Refills: 0 | Status: SHIPPED | OUTPATIENT
Start: 2022-08-18 | End: 2022-08-23

## 2022-08-18 NOTE — PROGRESS NOTES
Assessment and Plan:     Problem List Items Addressed This Visit    None          Preventive health issues were discussed with patient, and age appropriate screening tests were ordered as noted in patient's After Visit Summary  Personalized health advice and appropriate referrals for health education or preventive services given if needed, as noted in patient's After Visit Summary  History of Present Illness:     Patient presents for a Medicare Wellness Visit    HPI   Patient Care Team:  Rigoberto Cortez MD as PCP - General  Rigoberto Cortez MD     Review of Systems:     Review of Systems     Problem List:     Patient Active Problem List   Diagnosis    Hyperlipidemia    Esophagitis, reflux    Hypothyroidism    EDEL (obstructive sleep apnea)    Vitamin D deficiency    Insufficient sleep syndrome    Chronic rhinitis    Mild intermittent asthma without complication    Gastroesophageal reflux disease without esophagitis    Obesity (BMI 30-39  9)    Palpitations    Hyperglycemia    Paresthesia    Pruritus    Obesity, morbid (HCC)    Chronic bilateral thoracic back pain    Hot flashes      Past Medical and Surgical History:     Past Medical History:   Diagnosis Date    Asthma     Glaucoma      Past Surgical History:   Procedure Laterality Date    EYE SURGERY Left 06/08/2020     macular gap - 06/08/2020 & 11/02/2020      Family History:     Family History   Problem Relation Age of Onset    Heart disease Mother     Other Maternal Grandmother         Thyroid disorder    No Known Problems Maternal Grandfather     No Known Problems Paternal Grandmother     No Known Problems Paternal Grandfather     No Known Problems Father     No Known Problems Son     No Known Problems Son     No Known Problems Son     No Known Problems Brother     No Known Problems Brother     No Known Problems Maternal Aunt     Breast cancer Paternal Aunt         52's    Breast cancer Cousin         age unknown, 42's   [place] Breast cancer Cousin         early 52's      Social History:     Social History     Socioeconomic History    Marital status: /Civil Union     Spouse name: None    Number of children: None    Years of education: None    Highest education level: None   Occupational History    None   Tobacco Use    Smoking status: Never Smoker    Smokeless tobacco: Never Used   Vaping Use    Vaping Use: Never used   Substance and Sexual Activity    Alcohol use: Yes     Comment: social    Drug use: No    Sexual activity: Not Currently   Other Topics Concern    None   Social History Narrative    None     Social Determinants of Health     Financial Resource Strain: Not on file   Food Insecurity: Not on file   Transportation Needs: Not on file   Physical Activity: Not on file   Stress: Not on file   Social Connections: Not on file   Intimate Partner Violence: Not on file   Housing Stability: Not on file      Medications and Allergies:     Current Outpatient Medications   Medication Sig Dispense Refill    Biotin 1000 MCG tablet Take 1,000 mcg by mouth daily      budesonide-formoterol (SYMBICORT) 160-4 5 mcg/act inhaler Inhale daily       Cholecalciferol 50 MCG (2000 UT) CAPS Take 1 capsule by mouth daily      co-enzyme Q-10 30 MG capsule Take 30 mg by mouth 3 (three) times a day      EPINEPHrine (EPIPEN) 0 3 mg/0 3 mL SOAJ Inject 0 3 mL as directed      fluticasone (FLONASE) 50 mcg/act nasal spray 2 sprays into each nostril daily      levothyroxine 25 mcg tablet One tablet Mon-Fri, Two tablets Sat-Sun  116 tablet 3    loratadine (CLARITIN) 10 mg tablet Take 1 tablet by mouth daily      Multiple Vitamin (MULTI-VITAMIN DAILY PO) Take 1 tablet by mouth daily      Omega-3 Fatty Acids (EQL OMEGA 3 FISH OIL) 1000 MG CAPS Take 1 capsule by mouth daily      pantoprazole (PROTONIX) 40 mg tablet TAKE 1 TABLET DAILY 90 tablet 1    simvastatin (ZOCOR) 20 mg tablet Take 1 tablet (20 mg total) by mouth daily 90 tablet 3 No current facility-administered medications for this visit  Allergies   Allergen Reactions    Bee Venom Anaphylaxis     Hornets, wasp, and bees    Codeine Nausea Only and Vomiting     Reaction Date: 24Aug2011;     Other      Narcotic ---nausea and vomiting     Oxycodone-Acetaminophen Nausea Only and Vomiting     Reaction Date: 24Aug2011;     Vicodin  [Hydrocodone-Acetaminophen] Nausea Only and Vomiting     Reaction Date: 24Aug2011;     Sulfa Antibiotics Rash      Immunizations:     Immunization History   Administered Date(s) Administered    COVID-19 MODERNA VACC 0 5 ML IM 02/23/2021, 03/23/2021, 04/22/2022    COVID-19, unspecified 03/20/2021    H1N1, All Formulations 10/30/2009    INFLUENZA 10/01/2015, 10/04/2016, 10/13/2021    Influenza, high dose seasonal 0 7 mL 09/04/2020    Influenza, seasonal, injectable 10/01/2008, 10/01/2016, 10/02/2017    Influenza, seasonal, injectable, preservative free 10/01/2018    Pneumococcal Conjugate 13-Valent 09/04/2020    Pneumococcal Polysaccharide PPV23 11/20/2019    Td (adult), adsorbed 06/01/1999    Zoster 04/30/2014, 03/10/2015      Health Maintenance:         Topic Date Due    Hepatitis C Screening  Never done    Colorectal Cancer Screening  12/20/2023    Breast Cancer Screening: Mammogram  06/20/2024         Topic Date Due    Influenza Vaccine (1) 09/01/2022      Medicare Screening Tests and Risk Assessments:         Health Risk Assessment:   Patient rates overall health as very good  Patient feels that their physical health rating is same  Patient is satisfied with their life  Eyesight was rated as same  Hearing was rated as same  Patient feels that their emotional and mental health rating is same  Patients states they are never, rarely angry  Patient states they are never, rarely unusually tired/fatigued  Pain experienced in the last 7 days has been none  Depression Screening:   PHQ-2 Score: 0      Fall Risk Screening:    In the past year, patient has experienced: no history of falling in past year      Urinary Incontinence Screening:   Patient has not leaked urine accidently in the last six months  Home Safety:  Patient does not have trouble with stairs inside or outside of their home  Patient has working smoke alarms and has working carbon monoxide detector  Home safety hazards include: none  Nutrition:   Current diet is Regular  Medications:   Patient is currently taking over-the-counter supplements  OTC medications include: see medication list  Patient is able to manage medications  Activities of Daily Living (ADLs)/Instrumental Activities of Daily Living (IADLs):   Walk and transfer into and out of bed and chair?: Yes  Dress and groom yourself?: Yes    Bathe or shower yourself?: Yes    Feed yourself?  Yes  Do your laundry/housekeeping?: Yes  Manage your money, pay your bills and track your expenses?: Yes  Make your own meals?: Yes    Do your own shopping?: Yes    Previous Hospitalizations:   Any hospitalizations or ED visits within the last 12 months?: No      Advance Care Planning:   Living will: Yes    Advanced directive: Yes      PREVENTIVE SCREENINGS      Cardiovascular Screening:    General: History Lipid Disorder and Screening Current      Diabetes Screening:     General: History Diabetes and Screening Current      Colorectal Cancer Screening:     General: Screening Current      Breast Cancer Screening:     General: Screening Current      Cervical Cancer Screening:    General: Screening Not Indicated      Osteoporosis Screening:    General: Screening Not Indicated and History Osteoporosis      Lung Cancer Screening:     General: Screening Not Indicated    Screening, Brief Intervention, and Referral to Treatment (SBIRT)    Screening    Typical number of drinks in a week: 1    Single Item Drug Screening:  How often have you used an illegal drug (including marijuana) or a prescription medication for non-medical reasons in the past year? never    Single Item Drug Screen Score: 0  Interpretation: Negative screen for possible drug use disorder    No exam data present     Physical Exam:     Ht 5' 1" (1 549 m)   Wt 83 5 kg (184 lb)   BMI 34 77 kg/m²     Physical Exam     Jerald Mccann MD  Virtual AWV Consent    Verification of patient location:    Patient is located in the following state in which I hold an active license [place]    Reason for visit is   Encounter provider Jerald Mccann MD    Provider located at [address] [place] [id number]      Recent Visits  No visits were found meeting these conditions  Showing recent visits within past 7 days and meeting all other requirements  Today's Visits  Date Type Provider Dept   08/18/22 Telemedicine Jerald Mccann MD [id number] today's visits and meeting all other requirements  Future Appointments  No visits were found meeting these conditions  Showing future appointments within next 150 days and meeting all other requirements       After connecting through [employer], the patient was identified by name and date of birth  Radha Gutiérrez was informed that this is a telemedicine visit and that the visit is being conducted through [address] [place] [address] Now and patient was informed that this is a secure, HIPAA-compliant platform  She agrees to proceed  My office door was closed  No one else was in the room  She acknowledged consent and understanding of privacy and security of the video platform  Radha Gutiérrez verbally agrees to participate in [employer]  Pt is aware that [employer] could be limited without vital signs or the ability to perform a full hands-on physical exam  Cynthia Reyes understands she or the provider may request at any time to terminate the video visit and request the patient to seek care or treatment in person  Patient is aware this is a billable service

## 2022-08-18 NOTE — PATIENT INSTRUCTIONS
Medicare Preventive Visit Patient Instructions  Thank you for completing your Welcome to Medicare Visit or Medicare Annual Wellness Visit today  Your next wellness visit will be due in one year (8/19/2023)  The screening/preventive services that you may require over the next 5-10 years are detailed below  Some tests may not apply to you based off risk factors and/or age  Screening tests ordered at today's visit but not completed yet may show as past due  Also, please note that scanned in results may not display below  Preventive Screenings:  Service Recommendations Previous Testing/Comments   Colorectal Cancer Screening  * Colonoscopy    * Fecal Occult Blood Test (FOBT)/Fecal Immunochemical Test (FIT)  * Fecal DNA/Cologuard Test  * Flexible Sigmoidoscopy Age: 39-70 years old   Colonoscopy: every 10 years (may be performed more frequently if at higher risk)  OR  FOBT/FIT: every 1 year  OR  Cologuard: every 3 years  OR  Sigmoidoscopy: every 5 years  Screening may be recommended earlier than age 39 if at higher risk for colorectal cancer  Also, an individualized decision between you and your healthcare provider will decide whether screening between the ages of 74-80 would be appropriate  Colonoscopy: 12/20/2018  FOBT/FIT: Not on file  Cologuard: Not on file  Sigmoidoscopy: Not on file    Screening Current     Breast Cancer Screening Age: 36 years old  Frequency: every 1-2 years  Not required if history of left and right mastectomy Mammogram: 06/20/2022    Screening Current   Cervical Cancer Screening Between the ages of 21-29, pap smear recommended once every 3 years  Between the ages of 33-67, can perform pap smear with HPV co-testing every 5 years     Recommendations may differ for women with a history of total hysterectomy, cervical cancer, or abnormal pap smears in past  Pap Smear: Not on file    Screening Not Indicated   Hepatitis C Screening Once for adults born between 1945 and 1965  More frequently in patients at high risk for Hepatitis C Hep C Antibody: Not on file        Diabetes Screening 1-2 times per year if you're at risk for diabetes or have pre-diabetes Fasting glucose: 99 mg/dL (6/18/2022)  A1C: 6 0 % (6/18/2022)  Screening Not Indicated  History Diabetes   Cholesterol Screening Once every 5 years if you don't have a lipid disorder  May order more often based on risk factors  Lipid panel: 06/18/2022    Screening Not Indicated  History Lipid Disorder     Other Preventive Screenings Covered by Medicare:  1  Abdominal Aortic Aneurysm (AAA) Screening: covered once if your at risk  You're considered to be at risk if you have a family history of AAA  2  Lung Cancer Screening: covers low dose CT scan once per year if you meet all of the following conditions: (1) Age 50-69; (2) No signs or symptoms of lung cancer; (3) Current smoker or have quit smoking within the last 15 years; (4) You have a tobacco smoking history of at least 20 pack years (packs per day multiplied by number of years you smoked); (5) You get a written order from a healthcare provider  3  Glaucoma Screening: covered annually if you're considered high risk: (1) You have diabetes OR (2) Family history of glaucoma OR (3)  aged 48 and older OR (3)  American aged 72 and older  3  Osteoporosis Screening: covered every 2 years if you meet one of the following conditions: (1) You're estrogen deficient and at risk for osteoporosis based off medical history and other findings; (2) Have a vertebral abnormality; (3) On glucocorticoid therapy for more than 3 months; (4) Have primary hyperparathyroidism; (5) On osteoporosis medications and need to assess response to drug therapy  · Last bone density test (DXA Scan): 06/11/2019   5  HIV Screening: covered annually if you're between the age of 15-65  Also covered annually if you are younger than 13 and older than 72 with risk factors for HIV infection   For pregnant patients, it is covered up to 3 times per pregnancy  Immunizations:  Immunization Recommendations   Influenza Vaccine Annual influenza vaccination during flu season is recommended for all persons aged >= 6 months who do not have contraindications   Pneumococcal Vaccine   * Pneumococcal conjugate vaccine = PCV13 (Prevnar 13), PCV15 (Vaxneuvance), PCV20 (Prevnar 20)  * Pneumococcal polysaccharide vaccine = PPSV23 (Pneumovax) Adults 25-60 years old: 1-3 doses may be recommended based on certain risk factors  Adults 72 years old: 1-2 doses may be recommended based off what pneumonia vaccine you previously received   Hepatitis B Vaccine 3 dose series if at intermediate or high risk (ex: diabetes, end stage renal disease, liver disease)   Tetanus (Td) Vaccine - COST NOT COVERED BY MEDICARE PART B Following completion of primary series, a booster dose should be given every 10 years to maintain immunity against tetanus  Td may also be given as tetanus wound prophylaxis  Tdap Vaccine - COST NOT COVERED BY MEDICARE PART B Recommended at least once for all adults  For pregnant patients, recommended with each pregnancy  Shingles Vaccine (Shingrix) - COST NOT COVERED BY MEDICARE PART B  2 shot series recommended in those aged 48 and above     Health Maintenance Due:      Topic Date Due    Hepatitis C Screening  Never done    Colorectal Cancer Screening  12/20/2023    Breast Cancer Screening: Mammogram  06/20/2024     Immunizations Due:      Topic Date Due    Influenza Vaccine (1) 09/01/2022     Advance Directives   What are advance directives? Advance directives are legal documents that state your wishes and plans for medical care  These plans are made ahead of time in case you lose your ability to make decisions for yourself  Advance directives can apply to any medical decision, such as the treatments you want, and if you want to donate organs  What are the types of advance directives?   There are many types of advance directives, and each state has rules about how to use them  You may choose a combination of any of the following:  · Living will: This is a written record of the treatment you want  You can also choose which treatments you do not want, which to limit, and which to stop at a certain time  This includes surgery, medicine, IV fluid, and tube feedings  · Durable power of  for healthcare [facility]): This is a written record that states who you want to make healthcare choices for you when you are unable to make them for yourself  This person, called a proxy, is usually a family member or a friend  You may choose more than 1 proxy  · Do not resuscitate (DNR) order:  A DNR order is used in case your heart stops beating or you stop breathing  It is a request not to have certain forms of treatment, such as CPR  A DNR order may be included in other types of advance directives  · Medical directive: This covers the care that you want if you are in a coma, near death, or unable to make decisions for yourself  You can list the treatments you want for each condition  Treatment may include pain medicine, surgery, blood transfusions, dialysis, IV or tube feedings, and a ventilator (breathing machine)  · Values history: This document has questions about your views, beliefs, and how you feel and think about life  This information can help others choose the care that you would choose  Why are advance directives important? An advance directive helps you control your care  Although spoken wishes may be used, it is better to have your wishes written down  Spoken wishes can be misunderstood, or not followed  Treatments may be given even if you do not want them  An advance directive may make it easier for your family to make difficult choices about your care     Weight Management   Why it is important to manage your weight:  Being overweight increases your risk of health conditions such as heart disease, high blood pressure, type 2 diabetes, and certain types of cancer  It can also increase your risk for osteoarthritis, sleep apnea, and other respiratory problems  Aim for a slow, steady weight loss  Even a small amount of weight loss can lower your risk of health problems  How to lose weight safely:  A safe and healthy way to lose weight is to eat fewer calories and get regular exercise  You can lose up about 1 pound a week by decreasing the number of calories you eat by 500 calories each day  Healthy meal plan for weight management:  A healthy meal plan includes a variety of foods, contains fewer calories, and helps you stay healthy  A healthy meal plan includes the following:  · Eat whole-grain foods more often  A healthy meal plan should contain fiber  Fiber is the part of grains, fruits, and vegetables that is not broken down by your body  Whole-grain foods are healthy and provide extra fiber in your diet  Some examples of whole-grain foods are whole-wheat breads and pastas, oatmeal, brown rice, and bulgur  · Eat a variety of vegetables every day  Include dark, leafy greens such as spinach, kale, glenroy greens, and mustard greens  Eat yellow and orange vegetables such as carrots, sweet potatoes, and winter squash  · Eat a variety of fruits every day  Choose fresh or canned fruit (canned in its own juice or light syrup) instead of juice  Fruit juice has very little or no fiber  · Eat low-fat dairy foods  Drink fat-free (skim) milk or 1% milk  Eat fat-free yogurt and low-fat cottage cheese  Try low-fat cheeses such as mozzarella and other reduced-fat cheeses  · Choose meat and other protein foods that are low in fat  Choose beans or other legumes such as split peas or lentils  Choose fish, skinless poultry (chicken or turkey), or lean cuts of red meat (beef or pork)  Before you cook meat or poultry, cut off any visible fat  · Use less fat and oil  Try baking foods instead of frying them   Add less fat, such as margarine, sour cream, regular salad dressing and mayonnaise to foods  Eat fewer high-fat foods  Some examples of high-fat foods include french fries, doughnuts, ice cream, and cakes  · Eat fewer sweets  Limit foods and drinks that are high in sugar  This includes candy, cookies, regular soda, and sweetened drinks  Exercise:  Exercise at least 30 minutes per day on most days of the week  Some examples of exercise include walking, biking, dancing, and swimming  You can also fit in more physical activity by taking the stairs instead of the elevator or parking farther away from stores  Ask your healthcare provider about the best exercise plan for you  © Copyright [employer] 2018 Information is for End User's use only and may not be sold, redistributed or otherwise used for commercial purposes   All illustrations and images included in CareNotes® are the copyrighted property of A D A M , Inc  or [address] [place] [address]

## 2022-08-18 NOTE — ASSESSMENT & PLAN NOTE
COVID-19 infection  Patient was given prescription for Paxlovid to use as directed for 5 days  She is aware to continue wearing mask for minimum of 10 days from when symptoms began  She will call if she develops any worsening of symptoms

## 2022-08-18 NOTE — PROGRESS NOTES
Virtual Regular Visit    Verification of patient location:    Patient is located in the following state in which I hold an active license [place]      Assessment/Plan:    Problem List Items Addressed This Visit        Other    COVID-19 virus infection - Primary     COVID-19 infection  Patient was given prescription for Paxlovid to use as directed for 5 days  She is aware to continue wearing mask for minimum of 10 days from when symptoms began  She will call if she develops any worsening of symptoms  Relevant Medications    nirmatrelvir & ritonavir (Paxlovid, 300/100,) tablet therapy pack               Reason for visit is   Chief Complaint   Patient presents with    COVID-19    Cough    Diarrhea    Virtual Awv        Encounter provider Jeff Scott MD    Provider located at [address] [place] [id number]      Recent Visits  No visits were found meeting these conditions  Showing recent visits within past 7 days and meeting all other requirements  Today's Visits  Date Type Provider Dept   08/18/22 Telemedicine Jeff Scott MD [facility] today's visits and meeting all other requirements  Future Appointments  No visits were found meeting these conditions  Showing future appointments within next 150 days and meeting all other requirements       The patient was identified by name and date of birth  Gracia Aureliano was informed that this is a telemedicine visit and that the visit is being conducted through [address] [place] [address] Now and patient was informed that this is a secure, HIPAA-compliant platform  She agrees to proceed     My office door was closed  No one else was in the room  She acknowledged consent and understanding of privacy and security of the video platform  The patient has agreed to participate and understands they can discontinue the visit at any time  Patient is aware this is a billable service       Leonel Marie Zoila Black is a 75 y o  female       Patient having telemedicine visit after she tested positive for COVID-19 this morning  She states 2 days ago she began having some sneezing and congestion and minimal coughing  She denies any fevers  She states her grandchildren did have COVID-23 approximately 2 weeks ago  She is concerned for her  who has not test positive and is asymptomatic however does have significant medical history which would put him at higher risk for complications  She has self quarantined from her  and does wear mask in the home  Cough    Diarrhea   Associated symptoms include coughing          Past Medical History:   Diagnosis Date    Asthma     Glaucoma        Past Surgical History:   Procedure Laterality Date    EYE SURGERY Left 06/08/2020     macular gap - 06/08/2020 & 11/02/2020       Current Outpatient Medications   Medication Sig Dispense Refill    Biotin 1000 MCG tablet Take 1,000 mcg by mouth daily      budesonide-formoterol (SYMBICORT) 160-4 5 mcg/act inhaler Inhale daily       Cholecalciferol 50 MCG (2000 UT) CAPS Take 1 capsule by mouth daily      co-enzyme Q-10 30 MG capsule Take 30 mg by mouth 3 (three) times a day      EPINEPHrine (EPIPEN) 0 3 mg/0 3 mL SOAJ Inject 0 3 mL as directed      fluticasone (FLONASE) 50 mcg/act nasal spray 2 sprays into each nostril daily      levothyroxine 25 mcg tablet One tablet Mon-Fri, Two tablets Sat-Sun  116 tablet 3    loratadine (CLARITIN) 10 mg tablet Take 1 tablet by mouth daily      Multiple Vitamin (MULTI-VITAMIN DAILY PO) Take 1 tablet by mouth daily      nirmatrelvir & ritonavir (Paxlovid, 300/100,) tablet therapy pack Take 3 tablets by mouth 2 (two) times a day for 5 days Take 2 nirmatrelvir tablets + 1 ritonavir tablet together per dose 30 tablet 0    Omega-3 Fatty Acids (EQL OMEGA 3 FISH OIL) 1000 MG CAPS Take 1 capsule by mouth daily      pantoprazole (PROTONIX) 40 mg tablet TAKE 1 TABLET DAILY 90 tablet 1  simvastatin (ZOCOR) 20 mg tablet Take 1 tablet (20 mg total) by mouth daily 90 tablet 3     No current facility-administered medications for this visit  Allergies   Allergen Reactions    Bee Venom Anaphylaxis     Hornets, wasp, and bees    Codeine Nausea Only and Vomiting     Reaction Date: 24Aug2011;     Other      Narcotic ---nausea and vomiting     Oxycodone-Acetaminophen Nausea Only and Vomiting     Reaction Date: 24Aug2011;     Vicodin  [Hydrocodone-Acetaminophen] Nausea Only and Vomiting     Reaction Date: 24Aug2011;     Sulfa Antibiotics Rash       Review of Systems   Constitutional: Positive for fatigue  HENT: Positive for congestion  Respiratory: Positive for cough  Cardiovascular: Negative  Gastrointestinal: Positive for diarrhea  Genitourinary: Negative  Musculoskeletal: Negative  Video Exam    Vitals:    08/18/22 1037   BP: 117/72   Temp: 98 5 °F (36 9 °C)   Weight: 83 5 kg (184 lb)   Height: 5' 1" (1 549 m)       Physical Exam  Constitutional:       Appearance: Normal appearance  HENT:      Head: Normocephalic and atraumatic  Eyes:      General:         Right eye: No discharge  Left eye: No discharge  Extraocular Movements: Extraocular movements intact  Conjunctiva/sclera: Conjunctivae normal       Pupils: Pupils are equal, round, and reactive to light  Pulmonary:      Effort: No respiratory distress  Neurological:      General: No focal deficit present  Mental Status: She is alert and oriented to person, place, and time  Psychiatric:         Mood and Affect: Mood normal          Behavior: Behavior normal          Thought Content:  Thought content normal          Judgment: Judgment normal           I spent 15 minutes directly with the patient during this visit

## 2022-09-01 DIAGNOSIS — K21.9 GASTROESOPHAGEAL REFLUX DISEASE WITHOUT ESOPHAGITIS: ICD-10-CM

## 2022-09-01 RX ORDER — PANTOPRAZOLE SODIUM 40 MG/1
TABLET, DELAYED RELEASE ORAL
Qty: 90 TABLET | Refills: 1 | Status: SHIPPED | OUTPATIENT
Start: 2022-09-01

## 2022-09-20 ENCOUNTER — CLINICAL SUPPORT (OUTPATIENT)
Dept: FAMILY MEDICINE CLINIC | Facility: CLINIC | Age: 67
End: 2022-09-20
Payer: MEDICARE

## 2022-09-20 DIAGNOSIS — Z23 INFLUENZA VACCINE NEEDED: Primary | ICD-10-CM

## 2022-09-20 PROCEDURE — G0008 ADMIN INFLUENZA VIRUS VAC: HCPCS | Performed by: FAMILY MEDICINE

## 2022-09-20 PROCEDURE — 90662 IIV NO PRSV INCREASED AG IM: CPT | Performed by: FAMILY MEDICINE

## 2022-11-07 ENCOUNTER — HOSPITAL ENCOUNTER (OUTPATIENT)
Dept: RADIOLOGY | Age: 67
Discharge: HOME | End: 2022-11-07

## 2022-11-07 DIAGNOSIS — Z13.820 ENCOUNTER FOR SCREENING FOR OSTEOPOROSIS: ICD-10-CM

## 2022-11-07 DIAGNOSIS — M81.0 AGE-RELATED OSTEOPOROSIS WITHOUT CURRENT PATHOLOGICAL FRACTURE: ICD-10-CM

## 2022-12-07 ENCOUNTER — OFFICE VISIT (OUTPATIENT)
Dept: SLEEP CENTER | Facility: CLINIC | Age: 67
End: 2022-12-07

## 2022-12-07 VITALS
HEIGHT: 62 IN | BODY MASS INDEX: 34.85 KG/M2 | WEIGHT: 189.4 LBS | DIASTOLIC BLOOD PRESSURE: 78 MMHG | OXYGEN SATURATION: 98 % | SYSTOLIC BLOOD PRESSURE: 138 MMHG | HEART RATE: 84 BPM

## 2022-12-07 DIAGNOSIS — F41.9 ANXIETY: ICD-10-CM

## 2022-12-07 DIAGNOSIS — K21.9 GASTROESOPHAGEAL REFLUX DISEASE WITHOUT ESOPHAGITIS: ICD-10-CM

## 2022-12-07 DIAGNOSIS — E66.9 OBESITY (BMI 30-39.9): ICD-10-CM

## 2022-12-07 DIAGNOSIS — G47.33 OSA (OBSTRUCTIVE SLEEP APNEA): Primary | ICD-10-CM

## 2022-12-07 DIAGNOSIS — F43.9 STRESS AT HOME: ICD-10-CM

## 2022-12-07 DIAGNOSIS — J31.0 CHRONIC RHINITIS: ICD-10-CM

## 2022-12-07 DIAGNOSIS — J45.20 MILD INTERMITTENT ASTHMA WITHOUT COMPLICATION: ICD-10-CM

## 2022-12-07 NOTE — PATIENT INSTRUCTIONS

## 2022-12-07 NOTE — PROGRESS NOTES
Follow-Up Note - Sleep Blaze Hannah  75 y o  female  CJO:[birth year]  ZKJ:[id number]  DOS:12/7/2022    CC: I saw this patient for follow-up in clinic today for Sleep disordered breathing, Coexisting Sleep and Medical Problems  She got a replacement DreamStation version to machine from [employer] about a year ago  PFSH, Problem List, Medications & Allergies were reviewed in EMR  Interval changes: None reported  She  has a past medical history of Allergic rhinitis, Anaphylaxis, Asthma, GERD (gastroesophageal reflux disease), Glaucoma, and Hypothyroidism  She has a current medication list which includes the following prescription(s): biotin, budesonide-formoterol, co-enzyme q-10, epinephrine, fluticasone, levothyroxine, loratadine, multiple vitamin, eql omega 3 fish oil, pantoprazole, simvastatin, cholecalciferol, and epinephrine  PHYSIOLOGICAL DATA REVIEW AND INTERPRETATION: Using PAP > 4 hours/night 97%  Estimated PARAG 1 2/hour with pressure of 8 8cm [email or web address] percentile; Patient has not been using ozone based devices to sanitize the machine  SUBJECTIVE: Regarding use of PAPCynthia reports:   · no adverse effects:  has not noticed any fibres or foreign material in air line  · She is benefiting from use: sleeping better   Sleep Routine: Slyjob Degrootns reports getting 7 hrs sleep out of approximately 8 hours in bed; she has no difficulty initiating or maintaining sleep   She arises spontaneously and feels refreshed  Sly Duran denies excessive daytime sleepiness,  She rated [herself] at Total score: 2 /24 on the [place] Sleepiness Scale  Habits:[ reports that she has never smoked  She has never used smokeless tobacco ], [ reports current alcohol use ], [ reports no history of drug use ], Caffeine use:none; Exercise routine: none but is physically active as caregiver to disabled   ROS: reviewed & as attached  Significant for few pounds weight gain    Allergies and asthma controlled but she has some dyspnea on effort  She reports palpitations that she attributes to stress at home and anxiety  Ed Cordova EXAM: /78   Pulse 84   Ht 5' 2" (1 575 m)   Wt 85 9 kg (189 lb 6 4 oz)   SpO2 98%   BMI 34 64 kg/m²     Wt Readings from Last 3 Encounters:   12/07/22 85 9 kg (189 lb 6 4 oz)   10/06/22 83 9 kg (185 lb)   08/18/22 83 5 kg (184 lb)        Patient is well groomed; well appearing  CNS: Alert, orientated, clear & coherent speech  Psych: cooperative and in no distress  Mental state: Appears normal   H&N: EOMI; NC/AT: No facial pressure marks, no rashes  Skin/Extrem: col & hydration normal; no edema  Resp: Respiratory effort is normal  Physical findings otherwise essentially unchanged from previous  IMPRESSION: Problem List Items & Comorbidities Addressed this Visit    1  EDEL (obstructive sleep apnea)  PAP DME Resupply/Reorder      2  Stress at home        3  Anxiety        4  Chronic rhinitis        5  Mild intermittent asthma without complication        6  Gastroesophageal reflux disease without esophagitis        7  Obesity (BMI 30-39  9)            PLAN:  1  I reviewed results of prior studies and physiologic data with the patient  2  I discussed treatment options with risks and benefits  3  Treatment with  PAP is medically necessary and Sandeep Rowek is agreable to continue use  4  Care of equipment, methods to improve comfort using PAP and importance of compliance with therapy were discussed  5  Pressure setting: continue 7-14 cmH2O     6  Rx provided to replace supplies and Care coordinated with DME provider  7  She has been trying to get assistance from  and I discussed other strategies for dealing with stress/anxiety  8  Discussed strategies for weight reduction  9  Follow-up is advised in 1 year or sooner if needed to monitor progress, compliance and to adjust therapy  Thank you for allowing me to participate in the care of this patient      Sincerely, Authenticated electronically on 56/65/80   Board Certified Specialist     Portions of the record may have been created with voice recognition software  Occasional wrong word or "sound a like" substitutions may have occurred due to the inherent limitations of voice recognition software  There may also be notations and random deletions of words or characters from malfunctioning software  Read the chart carefully and recognize, using context, where substitutions/deletions have occurred

## 2022-12-07 NOTE — PROGRESS NOTES
Review of Systems      Genitourinary none   Cardiology palpitations/fluttering feeling in the chest   Gastrointestinal none   Neurology numbness/tingling of an extremity   Constitutional none   Integumentary itching   Psychiatry none   Musculoskeletal none   Pulmonary shortness of breath with activity   ENT none   Endocrine none   Hematological none

## 2022-12-12 ENCOUNTER — TELEPHONE (OUTPATIENT)
Dept: SLEEP CENTER | Facility: CLINIC | Age: 67
End: 2022-12-12

## 2022-12-12 LAB

## 2023-02-06 DIAGNOSIS — E03.9 HYPOTHYROIDISM, UNSPECIFIED TYPE: ICD-10-CM

## 2023-02-06 RX ORDER — LEVOTHYROXINE SODIUM 0.03 MG/1
TABLET ORAL
Qty: 116 TABLET | Refills: 3 | Status: SHIPPED | OUTPATIENT
Start: 2023-02-06

## 2023-02-10 ENCOUNTER — TELEPHONE (OUTPATIENT)
Dept: SLEEP CENTER | Facility: CLINIC | Age: 68
End: 2023-02-10

## 2023-02-10 NOTE — TELEPHONE ENCOUNTER
Patient left message stating she has been having an issue the past 2 weeks with CPAP  She wakes up around 3-4am with her heart racing and "can't breathe correctly"  She then takes off the mask, takes a few deep breaths and calms herself and the feeling goes away  She feels fine throughout the day  Per patient, while wearing CPAP she feels like she is inhaling all the time and not exhaling  She is unsure if what is happening is just anxiety or something else  Spoke to patient  Advised I can obtain compliance data from [facility] for Dr Sabas Gallegos to review to see if a pressure change may be needed  Patient agreeable  Called [facility] who states patient did not get her machine from [facility]  They only provide her CPAP supplies  She will need to bring machine in to store for download  Called patient back and made her aware  She is taking the machine from the [facility] today  Awaiting compliance data from [facility]  Will then need to send to Dr Sabas Gallegos

## 2023-02-14 NOTE — TELEPHONE ENCOUNTER
Called patient and advised of Dr Aaron Bryan response  Patient states she does have allergies and asthma which are currently being treated with Claritin daily and inhalers  She does not have any congestion of any kind  When she took her machine to [facility] on Friday 2/10/23 to download compliance, the staff discovered that she had somehow turned off the EPR setting  They turned this back on and patient thought it would solve the problem but it didn't  The technician at [facility] suggested to the patient that her upper pressure limit of 14cm be lowered  Offered to schedule sooner follow up with Dr Jess Carvajal but patient declined  States she does not want to come in to office just to have pressure lowered  She is requesting that her upper pressure limit be lowered to see if that improves her issue  Dr Jess Carvajal, please advise

## 2023-02-27 ENCOUNTER — APPOINTMENT (EMERGENCY)
Dept: RADIOLOGY | Facility: HOSPITAL | Age: 68
End: 2023-02-27

## 2023-02-27 ENCOUNTER — HOSPITAL ENCOUNTER (EMERGENCY)
Facility: HOSPITAL | Age: 68
Discharge: HOME | End: 2023-02-27
Attending: EMERGENCY MEDICINE

## 2023-02-27 VITALS
OXYGEN SATURATION: 95 % | SYSTOLIC BLOOD PRESSURE: 151 MMHG | TEMPERATURE: 97.7 F | DIASTOLIC BLOOD PRESSURE: 73 MMHG | HEART RATE: 71 BPM | RESPIRATION RATE: 16 BRPM

## 2023-02-27 DIAGNOSIS — M41.9 SCOLIOSIS: ICD-10-CM

## 2023-02-27 DIAGNOSIS — R00.2 PALPITATIONS: ICD-10-CM

## 2023-02-27 DIAGNOSIS — M40.209 KYPHOSIS: ICD-10-CM

## 2023-02-27 DIAGNOSIS — S29.019A THORACIC MYOFASCIAL STRAIN, INITIAL ENCOUNTER: Primary | ICD-10-CM

## 2023-02-27 DIAGNOSIS — M47.814 DJD (DEGENERATIVE JOINT DISEASE) OF THORACIC SPINE: ICD-10-CM

## 2023-02-27 DIAGNOSIS — G47.30 SLEEP APNEA: ICD-10-CM

## 2023-02-27 LAB
ANION GAP SERPL CALCULATED.3IONS-SCNC: 7 MMOL/L (ref 4–13)
APTT PPP: 30 SECONDS (ref 23–37)
ATRIAL RATE: 72 BPM
BASOPHILS # BLD AUTO: 0.05 THOUSANDS/ÂΜL (ref 0–0.1)
BASOPHILS NFR BLD AUTO: 1 % (ref 0–1)
BUN SERPL-MCNC: 12 MG/DL (ref 5–25)
CALCIUM SERPL-MCNC: 9.8 MG/DL (ref 8.4–10.2)
CARDIAC TROPONIN I PNL SERPL HS: 2 NG/L
CHLORIDE SERPL-SCNC: 105 MMOL/L (ref 96–108)
CO2 SERPL-SCNC: 27 MMOL/L (ref 21–32)
CREAT SERPL-MCNC: 0.76 MG/DL (ref 0.6–1.3)
EOSINOPHIL # BLD AUTO: 0.1 THOUSAND/ÂΜL (ref 0–0.61)
EOSINOPHIL NFR BLD AUTO: 1 % (ref 0–6)
ERYTHROCYTE [DISTWIDTH] IN BLOOD BY AUTOMATED COUNT: 13.2 % (ref 11.6–15.1)
GFR SERPL CREATININE-BSD FRML MDRD: 80 ML/MIN/1.73SQ M
GLUCOSE SERPL-MCNC: 108 MG/DL (ref 65–140)
HCT VFR BLD AUTO: 47.8 % (ref 34.8–46.1)
HGB BLD-MCNC: 15.5 G/DL (ref 11.5–15.4)
IMM GRANULOCYTES # BLD AUTO: 0.02 THOUSAND/UL (ref 0–0.2)
IMM GRANULOCYTES NFR BLD AUTO: 0 % (ref 0–2)
INR PPP: 1.01 (ref 0.84–1.19)
LYMPHOCYTES # BLD AUTO: 1.19 THOUSANDS/ÂΜL (ref 0.6–4.47)
LYMPHOCYTES NFR BLD AUTO: 14 % (ref 14–44)
MAGNESIUM SERPL-MCNC: 2.2 MG/DL (ref 1.9–2.7)
MCH RBC QN AUTO: 30.7 PG (ref 26.8–34.3)
MCHC RBC AUTO-ENTMCNC: 32.4 G/DL (ref 31.4–37.4)
MCV RBC AUTO: 95 FL (ref 82–98)
MONOCYTES # BLD AUTO: 0.6 THOUSAND/ÂΜL (ref 0.17–1.22)
MONOCYTES NFR BLD AUTO: 7 % (ref 4–12)
NEUTROPHILS # BLD AUTO: 6.61 THOUSANDS/ÂΜL (ref 1.85–7.62)
NEUTS SEG NFR BLD AUTO: 77 % (ref 43–75)
NRBC BLD AUTO-RTO: 0 /100 WBCS
P AXIS: 53 DEGREES
PLATELET # BLD AUTO: 260 THOUSANDS/UL (ref 149–390)
PMV BLD AUTO: 9.3 FL (ref 8.9–12.7)
POTASSIUM SERPL-SCNC: 3.9 MMOL/L (ref 3.5–5.3)
PR INTERVAL: 160 MS
PROTHROMBIN TIME: 13.5 SECONDS (ref 11.6–14.5)
QRS AXIS: 28 DEGREES
QRSD INTERVAL: 80 MS
QT INTERVAL: 400 MS
QTC INTERVAL: 438 MS
RBC # BLD AUTO: 5.05 MILLION/UL (ref 3.81–5.12)
SODIUM SERPL-SCNC: 139 MMOL/L (ref 135–147)
T WAVE AXIS: -18 DEGREES
TSH SERPL DL<=0.05 MIU/L-ACNC: 2.08 UIU/ML (ref 0.45–4.5)
VENTRICULAR RATE: 72 BPM
WBC # BLD AUTO: 8.57 THOUSAND/UL (ref 4.31–10.16)

## 2023-02-27 RX ORDER — NAPROXEN 375 MG/1
375 TABLET ORAL 2 TIMES DAILY WITH MEALS
Qty: 14 TABLET | Refills: 0 | Status: SHIPPED | OUTPATIENT
Start: 2023-02-27 | End: 2023-03-06

## 2023-02-27 NOTE — ED PROVIDER NOTES
History  Chief Complaint   Patient presents with   • Back Pain     Patient reports on going back pain and discomfort at night  Patient reports also having issues with her sleeping and CPAP and having a sleep study  Patient reports being woken up at night with a racing heart, but fine once she gets up  Patient presents to the emergency room with a 1 month history of back pain  She complains of pain over the left paravertebral area of her thoracic spine  She states that the pain is a worse with laying down but relieved with sitting up  It is worse with activity  It is also worse with palpation  Patient has history of sleep apnea and over the past month has had problems with awakening during the night  She has been working with her sleep apnea physician to adjust her pressures  She found that she had hit a button affecting her exhalation  She states that that has been improving over the past week  She denies any  runny nose, or postnasal drip  Patient has a dry cough that is related to her asthma  There is no change in her cough recently  She denies any chest pain or shortness of breath  Denies any pleuritic component of her pain  Patient complains of palpitations that awaken her from sleep  She states her heart races during the night  She denies any excessive caffeine use  She states that she drinks 1 drink per evening with dinner  She denies any heavy alcohol use  She does not smoke cigarettes  Past medical history is positive for allergic rhinitis, anaphylaxis, asthma, GERD, glaucoma, hypothyroidism  Frontal diagnosis includes but is not limited to thoracic strain, acute coronary syndrome, myocarditis, pericarditis, GERD, aortic dissection, chest wall strain, costochondritis, compression fracture, herniated disc        History provided by:  Patient  Back Pain  Location:  Thoracic spine  Quality:  Aching  Pain severity:  Moderate  Pain is:  Same all the time  Onset quality:  Gradual  Duration: 1 month  Timing:  Intermittent  Chronicity:  New  Context: not emotional stress, not falling, not jumping from heights, not lifting heavy objects, not MCA, not MVA, not occupational injury, not pedestrian accident, not physical stress, not recent illness, not recent injury and not twisting    Relieved by: standing up  Worsened by:  Lying down  Ineffective treatments:  NSAIDs  Associated symptoms: no abdominal pain, no abdominal swelling, no bladder incontinence, no bowel incontinence, no chest pain, no dysuria, no fever, no leg pain, no numbness, no pelvic pain, no tingling and no weakness        Prior to Admission Medications   Prescriptions Last Dose Informant Patient Reported? Taking?    Biotin 1000 MCG tablet   Yes No   Sig: Take 1,000 mcg by mouth daily   Cholecalciferol 50 MCG (2000 UT) CAPS   Yes No   Sig: Take 1 capsule by mouth daily   EPINEPHrine (Auvi-Q) 0 3 mg/0 3 mL SOAJ   No No   Sig: Inject 0 3 mL (0 3 mg total) into a muscle once for 1 dose   EPINEPHrine (EPIPEN) 0 3 mg/0 3 mL SOAJ   Yes No   Sig: Inject 0 3 mL as directed   Multiple Vitamin (MULTI-VITAMIN DAILY PO)   Yes No   Sig: Take 1 tablet by mouth daily   Omega-3 Fatty Acids (EQL OMEGA 3 FISH OIL) 1000 MG CAPS   Yes No   Sig: Take 1 capsule by mouth daily   budesonide-formoterol (SYMBICORT) 160-4 5 mcg/act inhaler   Yes No   Sig: Inhale daily    co-enzyme Q-10 30 MG capsule   Yes No   Sig: Take 30 mg by mouth 3 (three) times a day   fluticasone (FLONASE) 50 mcg/act nasal spray   Yes No   Si sprays into each nostril daily   levothyroxine 25 mcg tablet   No No   Sig: TAKE 1 TABLET MONDAY -     FRIDAY AND 2 TABLETS       SATURDAY -    loratadine (CLARITIN) 10 mg tablet   Yes No   Sig: Take 1 tablet by mouth daily   pantoprazole (PROTONIX) 40 mg tablet   No No   Sig: TAKE 1 TABLET DAILY   simvastatin (ZOCOR) 20 mg tablet   No No   Sig: Take 1 tablet (20 mg total) by mouth daily      Facility-Administered Medications: None       Past Medical History:   Diagnosis Date   • Allergic rhinitis    • Anaphylaxis    • Asthma    • GERD (gastroesophageal reflux disease)    • Glaucoma    • Hypothyroidism        Past Surgical History:   Procedure Laterality Date   • EYE SURGERY Left 06/08/2020     macular gap - 06/08/2020 & 11/02/2020       Family History   Problem Relation Age of Onset   • Heart disease Mother    • Other Maternal Grandmother         Thyroid disorder   • No Known Problems Maternal Grandfather    • No Known Problems Paternal Grandmother    • No Known Problems Paternal Grandfather    • No Known Problems Father    • No Known Problems Son    • No Known Problems Son    • No Known Problems Son    • No Known Problems Brother    • No Known Problems Brother    • No Known Problems Maternal Aunt    • Breast cancer Paternal Aunt         52's   • Breast cancer Cousin         age unknown, 42's   • Breast cancer Cousin         early 52's     I have reviewed and agree with the history as documented  E-Cigarette/Vaping   • E-Cigarette Use Never User      E-Cigarette/Vaping Substances   • Nicotine No    • THC No    • CBD No    • Flavoring No    • Other No    • Unknown No      Social History     Tobacco Use   • Smoking status: Never   • Smokeless tobacco: Never   Vaping Use   • Vaping Use: Never used   Substance Use Topics   • Alcohol use: Yes     Comment: social   • Drug use: No       Review of Systems   Constitutional: Positive for activity change  Negative for appetite change, chills, diaphoresis, fatigue and fever  HENT: Negative for congestion, mouth sores, postnasal drip, rhinorrhea and sore throat  Respiratory: Positive for cough  Negative for chest tightness and shortness of breath  Chronic cough secondary to asthma   Cardiovascular: Positive for palpitations  Negative for chest pain and leg swelling  Gastrointestinal: Negative for abdominal pain, bowel incontinence, diarrhea, nausea and vomiting     Genitourinary: Negative for bladder incontinence, dysuria, frequency, hematuria, pelvic pain and urgency  Musculoskeletal: Positive for back pain  Skin: Negative for color change, pallor and rash  Neurological: Negative for tingling, weakness and numbness  Psychiatric/Behavioral: Negative for confusion  All other systems reviewed and are negative  Physical Exam  Physical Exam  Vitals and nursing note reviewed  Constitutional:       General: She is not in acute distress  Appearance: Normal appearance  She is obese  She is not ill-appearing, toxic-appearing or diaphoretic  Comments: Kyphosis   HENT:      Head: Normocephalic  Right Ear: External ear normal       Left Ear: External ear normal    Eyes:      General:         Right eye: No discharge  Left eye: No discharge  Conjunctiva/sclera: Conjunctivae normal    Cardiovascular:      Rate and Rhythm: Normal rate and regular rhythm  Heart sounds: Normal heart sounds  No murmur heard  No friction rub  No gallop  Pulmonary:      Effort: Pulmonary effort is normal       Breath sounds: Normal breath sounds  Abdominal:      Palpations: Abdomen is soft  Tenderness: There is no guarding or rebound  Musculoskeletal:      Cervical back: Neck supple  Comments: Examination of thoracic spine-it is atraumatic upon inspection  Patient does have a degree of kyphosis present  There is some left paravertebral tenderness palpated at the inferior aspect of her scapula  The remainder of the spine is nontender upon palpation  Patient has good range of motion in all planes  There is no bony tenderness palpated  Skin:     General: Skin is warm  Capillary Refill: Capillary refill takes less than 2 seconds  Neurological:      Mental Status: She is alert and oriented to person, place, and time  Psychiatric:         Mood and Affect: Mood normal          Behavior: Behavior normal          Thought Content:  Thought content normal  Judgment: Judgment normal          Vital Signs  ED Triage Vitals   Temperature Pulse Respirations Blood Pressure SpO2   02/27/23 0839 02/27/23 0839 02/27/23 0839 02/27/23 0841 02/27/23 0839   97 7 °F (36 5 °C) 80 18 160/94 98 %      Temp Source Heart Rate Source Patient Position - Orthostatic VS BP Location FiO2 (%)   02/27/23 0839 02/27/23 0839 02/27/23 0839 02/27/23 0839 --   Oral Monitor Lying Right arm       Pain Score       02/27/23 0839       2           Vitals:    02/27/23 0841 02/27/23 1005 02/27/23 1006 02/27/23 1007   BP: 160/94 142/63 134/66 151/73   Pulse:  71 82 71   Patient Position - Orthostatic VS:  Lying - Orthostatic VS Sitting - Orthostatic VS Standing - Orthostatic VS         Visual Acuity      ED Medications  Medications - No data to display    Diagnostic Studies  Results Reviewed     Procedure Component Value Units Date/Time    TSH [[id number]]  (Normal) Collected: 02/27/23 1002    Lab Status: Final result Specimen: Blood from Arm, Right Updated: 02/27/23 1056     TSH 3RD GENERATON 2 077 uIU/mL     Narrative:      Patients undergoing fluorescein dye angiography may retain small amounts of fluorescein in the body for 48-72 hours post procedure  Samples containing fluorescein can produce falsely depressed TSH values  If the patient had this procedure,a specimen should be resubmitted post fluorescein clearance        HS Troponin 0hr (reflex protocol) [[id number]]  (Normal) Collected: 02/27/23 1002    Lab Status: Final result Specimen: Blood from Arm, Right Updated: 02/27/23 1045     hs TnI 0hr 2 ng/L     Basic metabolic panel [[id number]] Collected: 02/27/23 1002    Lab Status: Final result Specimen: Blood from Arm, Right Updated: 02/27/23 1040     Sodium 139 mmol/L      Potassium 3 9 mmol/L      Chloride 105 mmol/L      CO2 27 mmol/L      ANION GAP 7 mmol/L      BUN 12 mg/dL      Creatinine 0 76 mg/dL      Glucose 108 mg/dL      Calcium 9 8 mg/dL      eGFR 80 ml/min/1 73sq m     Narrative: National Kid[employer] guidelines for Chronic Kidney Disease (CKD):   •  Stage 1 with normal or high GFR (GFR > 90 mL/min/1 73 square meters)  •  Stage 2 Mild CKD (GFR = 60-89 mL/min/1 73 square meters)  •  Stage 3A Moderate CKD (GFR = 45-59 mL/min/1 73 square meters)  •  Stage 3B Moderate CKD (GFR = 30-44 mL/min/1 73 square meters)  •  Stage 4 Severe CKD (GFR = 15-29 mL/min/1 73 square meters)  •  Stage 5 End Stage CKD (GFR <15 mL/min/1 73 square meters)  Note: GFR calculation is accurate only with a steady state creatinine    Magnesium [[id number]]  (Normal) Collected: 02/27/23 1002    Lab Status: Final result Specimen: Blood from Arm, Right Updated: 02/27/23 1040     Magnesium 2 2 mg/dL     Protime-INR [[id number]]  (Normal) Collected: 02/27/23 1002    Lab Status: Final result Specimen: Blood from Arm, Right Updated: 02/27/23 1032     Protime 13 5 seconds      INR 1 01    APTT [[id number]]  (Normal) Collected: 02/27/23 1002    Lab Status: Final result Specimen: Blood from Arm, Right Updated: 02/27/23 1032     PTT 30 seconds     CBC and differential [[id number]]  (Abnormal) Collected: 02/27/23 1002    Lab Status: Final result Specimen: Blood from Arm, Right Updated: 02/27/23 1020     WBC 8 57 Thousand/uL      RBC 5 05 Million/uL      Hemoglobin 15 5 g/dL      Hematocrit 47 8 %      MCV 95 fL      MCH 30 7 pg      MCHC 32 4 g/dL      RDW 13 2 %      MPV 9 3 fL      Platelets 175 Thousands/uL      nRBC 0 /100 WBCs      Neutrophils Relative 77 %      Immat GRANS % 0 %      Lymphocytes Relative 14 %      Monocytes Relative 7 %      Eosinophils Relative 1 %      Basophils Relative 1 %      Neutrophils Absolute 6 61 Thousands/µL      Immature Grans Absolute 0 02 Thousand/uL      Lymphocytes Absolute 1 19 Thousands/µL      Monocytes Absolute 0 60 Thousand/µL      Eosinophils Absolute 0 10 Thousand/µL      Basophils Absolute 0 05 Thousands/µL                  XR chest 2 views   ED Interpretation by Carlo Ryan Gutzweiler, PA-C (02/27 1143)   No acute cardiopulmonary disease      Final Result by Geovanni Angelo MD (02/27 9617)      No acute cardiopulmonary disease  Workstation performed: [id number]         XR spine thoracic 3 views   ED Interpretation by Vishal Shoemaker PA-C (02/27 1145)   Degenerative changes, no acute compression fracture                 Procedures  Procedures         ED Course                                             Medical Decision Making  Patient presents to the emergency room with 2 complaints  1 was palpitations where her heart was racing whenever she would lay flat to go to sleep  She also complained of left paravertebral thoracic Pain that was present when she laid flat but was relieved with sitting up  She denies any injuries  She has been under a lot of stress as she cares for her   She is his primary caregiver  She does a lot of lifting  She has a history of sleep apnea and has been having problems with her sleep apnea machine  She was seen and evaluated  She was well in appearance  Her lungs were clear to auscultation and percussion  Her heart was a regular rate and rhythm without murmur  There was no reproducible left sided paravertebral thoracic pain  Abdomen was soft nondistended nontender without mass or hepatosplenomegaly  There is no peripheral edema palpated  Laboratory studies were taken and were reviewed  EKG did not demonstrate any acute signs of ischemia  Chest x-ray demonstrated scoliosis and kyphosis  X-rays of the thoracic spine did not demonstrate any compression fracture  There was kyphosis present  The chest x-ray did not demonstrate any acute cardiopulmonary disease  Impression- thoracic myofascial strain  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  Scoliosis  Kyphosis  Palpitations    Plan-  Patient was set up for an outpatient Holter monitor to evaluate her palpitations    She does recall having a history of atrial fibrillation approximately 10 years ago but has not had any problems since that time  I explained to her the importance of having this test done to rule this out  I do not want her to walk around in atrial fibrillation unknowingly and then have a stroke  She understands and we will set this up with cardiology  She is to have a recheck with her family physician within the next week as well  As far as her CPAP machine is concerned and her sleep apnea, she is arranging a follow-up with her sleep apnea physician  Changes have already been made to her sleep apnea machine and she is starting to feel  better  Patient is going to try to avoid heavy lifting and get some extra help at home for the care of her   We will apply ice or heat to the back as needed  She will use a Salonpas daily  She will apply the patch for 12 hours then remove it for 12 hours prior to the new application of a new patch  She will take Naprosyn 375 mg twice daily as needed with food for the next 7 days  DJD (degenerative joint disease) of thoracic spine: acute illness or injury  Kyphosis: chronic illness or injury  Palpitations: self-limited or minor problem  Scoliosis: self-limited or minor problem  Sleep apnea: acute illness or injury  Thoracic myofascial strain, initial encounter: acute illness or injury  Amount and/or Complexity of Data Reviewed  Labs: ordered  Details: Reviewed by me  Radiology: ordered and independent interpretation performed  Details: Independently interpreted by me-chest x-ray demonstrates no acute cardiopulmonary disease  Thoracic spine x-rays demonstrate degenerative joint disease but noted degenerative disc disease  There is no evidence of a compression fracture present  Patient's thoracic spine is kyphotic  There is a slight S curve indicating scoliosis  ECG/medicine tests: ordered  Details: No signs of acute ischemia        Risk  Prescription drug management  Disposition  Final diagnoses:   Thoracic myofascial strain, initial encounter   DJD (degenerative joint disease) of thoracic spine   Palpitations   Sleep apnea   Scoliosis   Kyphosis     Time reflects when diagnosis was documented in both MDM as applicable and the Disposition within this note     Time User Action Codes Description Comment    2/27/2023 11:53 AM Richad Phlegm Add [C64 443E] Thoracic myofascial strain, initial encounter     2/27/2023 11:54 AM Richad Phlegm Add [H28 415] DJD (degenerative joint disease) of thoracic spine     2/27/2023 12:00 PM Richad Phlegm Add [R00 2] Palpitations     2/27/2023 12:03 PM Richad Phlegm Add [G47 30] Sleep apnea     2/27/2023  5:21 PM Richad Phlegm Add [M41 9] Scoliosis     2/27/2023  5:21 PM Richad Phlegm Add [U21 380] Kyphosis       ED Disposition     ED Disposition   Discharge    Condition   Stable    Date/Time   Mon Feb 27, 2023 12:00 PM    Comment   Cynthia Reyes discharge to home/self care                 Follow-up Information     Follow up With Specialties Details Why Contact Janit Gitelman, MD Family Medicine Schedule an appointment as soon as possible for a visit in 1 week  [address]  [phone]            Discharge Medication List as of 2/27/2023 12:03 PM      CONTINUE these medications which have NOT CHANGED    Details   Biotin 1000 MCG tablet Take 1,000 mcg by mouth daily, Historical Med      budesonide-formoterol (SYMBICORT) 160-4 5 mcg/act inhaler Inhale daily , Historical Med      Cholecalciferol 50 MCG (2000 UT) CAPS Take 1 capsule by mouth daily, Historical Med      co-enzyme Q-10 30 MG capsule Take 30 mg by mouth 3 (three) times a day, Historical Med      EPINEPHrine (EPIPEN) 0 3 mg/0 3 mL SOAJ Inject 0 3 mL as directed, Starting Wed 11/23/2016, Historical Med      fluticasone (FLONASE) 50 mcg/act nasal spray 2 sprays into each nostril daily, Historical Med levothyroxine 25 mcg tablet TAKE 1 TABLET MONDAY -     FRIDAY AND 2 TABLETS       SATURDAY - SUNDAY, Normal      loratadine (CLARITIN) 10 mg tablet Take 1 tablet by mouth daily, Historical Med      Multiple Vitamin (MULTI-VITAMIN DAILY PO) Take 1 tablet by mouth daily, Starting Wed 11/23/2016, Historical Med      Omega-3 Fatty Acids (EQL OMEGA 3 FISH OIL) 1000 MG CAPS Take 1 capsule by mouth daily, Historical Med      pantoprazole (PROTONIX) 40 mg tablet TAKE 1 TABLET DAILY, Normal      simvastatin (ZOCOR) 20 mg tablet Take 1 tablet (20 mg total) by mouth daily, Starting Thu 2/10/2022, Print             Outpatient Discharge Orders   Holter monitor   Standing Status: Future Standing Exp   Date: 04/27/23       PDMP Review     None          ED Provider  Electronically Signed by           Jah Regan PA-C  02/27/23 3834

## 2023-02-28 DIAGNOSIS — K21.9 GASTROESOPHAGEAL REFLUX DISEASE WITHOUT ESOPHAGITIS: ICD-10-CM

## 2023-02-28 RX ORDER — PANTOPRAZOLE SODIUM 40 MG/1
TABLET, DELAYED RELEASE ORAL
Qty: 90 TABLET | Refills: 1 | Status: SHIPPED | OUTPATIENT
Start: 2023-02-28

## 2023-03-03 DIAGNOSIS — E78.5 HYPERLIPIDEMIA, UNSPECIFIED HYPERLIPIDEMIA TYPE: ICD-10-CM

## 2023-03-03 RX ORDER — SIMVASTATIN 20 MG
20 TABLET ORAL DAILY
Qty: 90 TABLET | Refills: 3 | Status: SHIPPED | OUTPATIENT
Start: 2023-03-03

## 2023-03-16 ENCOUNTER — HOSPITAL ENCOUNTER (OUTPATIENT)
Dept: NON INVASIVE DIAGNOSTICS | Facility: CLINIC | Age: 68
Discharge: HOME | End: 2023-03-16

## 2023-03-16 DIAGNOSIS — R00.2 PALPITATIONS: ICD-10-CM

## 2023-03-29 ENCOUNTER — OFFICE VISIT (OUTPATIENT)
Dept: FAMILY MEDICINE CLINIC | Facility: CLINIC | Age: 68
End: 2023-03-29

## 2023-03-29 VITALS
BODY MASS INDEX: 34.6 KG/M2 | WEIGHT: 188 LBS | DIASTOLIC BLOOD PRESSURE: 69 MMHG | OXYGEN SATURATION: 95 % | HEART RATE: 82 BPM | HEIGHT: 62 IN | RESPIRATION RATE: 18 BRPM | TEMPERATURE: 97.8 F | SYSTOLIC BLOOD PRESSURE: 136 MMHG

## 2023-03-29 DIAGNOSIS — E78.5 HYPERLIPIDEMIA, UNSPECIFIED HYPERLIPIDEMIA TYPE: ICD-10-CM

## 2023-03-29 DIAGNOSIS — E03.9 HYPOTHYROIDISM, UNSPECIFIED TYPE: Primary | ICD-10-CM

## 2023-03-30 NOTE — PROGRESS NOTES
FAMILY PRACTICE OFFICE VISIT       NAME: Jimmy Reyes  AGE: 70 y o  SEX: female       : 1955        MRN: [id number]    DATE: 3/30/2023  TIME: 6:08 AM    Assessment and Plan     Problem List Items Addressed This Visit        Endocrine    Hypothyroidism - Primary     Hypothyroidism  TSH is stable on current dose of levothyroxine            Other    Hyperlipidemia     Hyperlipidemia  Lipid panel is stable on current dose of statin therapy                Chief Complaint     Chief Complaint   Patient presents with   • Follow-up       History of Present Illness     Patient in the office to discuss chronic medical conditions  She denies any recent illness  She expresses frustration with having to take care of her or her  due to his poor health  She has little support and does not trust any outside home health aides to do good enough job and taking care of her   She does not get out much other than food shopping for 1 to 2 hours  She has not been as active with regular form of exercise  She has met with an eldercare  and unfortunately cannot obtain adequate help without sustaining a significant tax burden  Review of Systems   Review of Systems   Constitutional: Positive for fatigue  HENT: Negative  Eyes: Negative  Respiratory: Negative  Cardiovascular: Negative  Gastrointestinal: Negative  Genitourinary: Negative  Musculoskeletal: Negative  Skin: Negative  Neurological: Negative  Hematological: Negative  Psychiatric/Behavioral: Positive for agitation and dysphoric mood  Active Problem List     Patient Active Problem List   Diagnosis   • Hyperlipidemia   • Esophagitis, reflux   • Hypothyroidism   • EDEL (obstructive sleep apnea)   • Vitamin D deficiency   • Insufficient sleep syndrome   • Chronic rhinitis   • Mild intermittent asthma without complication   • Gastroesophageal reflux disease without esophagitis   • Obesity (BMI 30-39  9)   • Palpitations   • Hyperglycemia   • Paresthesia   • Pruritus   • Obesity, morbid (HCC)   • Chronic bilateral thoracic back pain   • Hot flashes   • COVID-19 virus infection       Past Medical History:  Past Medical History:   Diagnosis Date   • Allergic rhinitis    • Anaphylaxis    • Asthma    • GERD (gastroesophageal reflux disease)    • Glaucoma    • Hypothyroidism        Past Surgical History:  Past Surgical History:   Procedure Laterality Date   • EYE SURGERY Left 06/08/2020     macular gap - 06/08/2020 & 11/02/2020       Family History:  Family History   Problem Relation Age of Onset   • Heart disease Mother    • Other Maternal Grandmother         Thyroid disorder   • No Known Problems Maternal Grandfather    • No Known Problems Paternal Grandmother    • No Known Problems Paternal Grandfather    • No Known Problems Father    • No Known Problems Son    • No Known Problems Son    • No Known Problems Son    • No Known Problems Brother    • No Known Problems Brother    • No Known Problems Maternal Aunt    • Breast cancer Paternal Aunt         52's   • Breast cancer Cousin         age unknown, 42's   • Breast cancer Cousin         early 52's       Social History:  Social History     Socioeconomic History   • Marital status: /Civil Union     Spouse name: Not on file   • Number of children: Not on file   • Years of education: Not on file   • Highest education level: Not on file   Occupational History   • Not on file   Tobacco Use   • Smoking status: Never   • Smokeless tobacco: Never   Vaping Use   • Vaping Use: Never used   Substance and Sexual Activity   • Alcohol use: Yes     Comment: social   • Drug use: No   • Sexual activity: Not Currently   Other Topics Concern   • Not on file   Social History Narrative   • Not on file     Social Determinants of Health     Financial Resource Strain: Low Risk    • Difficulty of Paying Living Expenses: Not hard at all   Food Insecurity: Not on file   Transportation Needs: No Transportation Needs   • Lack of Transportation (Medical): No   • Lack of Transportation (Non-Medical): No   Physical Activity: Not on file   Stress: Not on file   Social Connections: Not on file   Intimate Partner Violence: Not on file   Housing Stability: Not on file       Objective     Vitals:    03/29/23 1303   BP: 136/69   Pulse: 82   Resp: 18   Temp: 97 8 °F (36 6 °C)   SpO2: 95%     Wt Readings from Last 3 Encounters:   03/29/23 85 3 kg (188 lb)   12/07/22 85 9 kg (189 lb 6 4 oz)   10/06/22 83 9 kg (185 lb)       Physical Exam  Constitutional:       General: She is not in acute distress  Appearance: Normal appearance  She is not ill-appearing  HENT:      Head: Normocephalic and atraumatic  Eyes:      General:         Right eye: No discharge  Left eye: No discharge  Extraocular Movements: Extraocular movements intact  Conjunctiva/sclera: Conjunctivae normal       Pupils: Pupils are equal, round, and reactive to light  Neck:      Vascular: No carotid bruit  Cardiovascular:      Rate and Rhythm: Normal rate and regular rhythm  Heart sounds: Normal heart sounds  No murmur heard  Pulmonary:      Effort: Pulmonary effort is normal       Breath sounds: Normal breath sounds  No wheezing, rhonchi or rales  Musculoskeletal:      Right lower leg: No edema  Left lower leg: No edema  Lymphadenopathy:      Cervical: No cervical adenopathy  Skin:     Findings: No rash  Neurological:      General: No focal deficit present  Mental Status: She is alert and oriented to person, place, and time  Cranial Nerves: No cranial nerve deficit  Psychiatric:         Mood and Affect: Mood normal          Behavior: Behavior normal          Thought Content:  Thought content normal          Judgment: Judgment normal          Pertinent Laboratory/Diagnostic Studies:  Lab Results   Component Value Date    GLUCOSE 106 02/27/2015    BUN 12 02/27/2023    CREATININE 0 76 02/27/2023 CALCIUM 9 8 02/27/2023     11/02/2017    K 3 9 02/27/2023    CO2 27 02/27/2023     02/27/2023     Lab Results   Component Value Date    ALT 38 06/18/2022    AST 24 06/18/2022    ALKPHOS 70 06/18/2022    BILITOT 0 6 11/02/2017       Lab Results   Component Value Date    WBC 8 57 02/27/2023    HGB 15 5 (H) 02/27/2023    HCT 47 8 (H) 02/27/2023    MCV 95 02/27/2023     02/27/2023       Lab Results   Component Value Date    TSH 2 04 06/26/2019       Lab Results   Component Value Date    CHOL 189 11/02/2017     Lab Results   Component Value Date    TRIG 144 06/18/2022     Lab Results   Component Value Date    HDL 40 (L) 06/18/2022     Lab Results   Component Value Date    LDLCALC 109 (H) 06/18/2022     Lab Results   Component Value Date    HGBA1C 6 0 (H) 06/18/2022       Results for orders placed or performed during the hospital encounter of 02/27/23   CBC and differential   Result Value Ref Range    WBC 8 57 4 31 - 10 16 Thousand/uL    RBC 5 05 3 81 - 5 12 Million/uL    Hemoglobin 15 5 (H) 11 5 - 15 4 g/dL    Hematocrit 47 8 (H) 34 8 - 46 1 %    MCV 95 82 - 98 fL    MCH 30 7 26 8 - 34 3 pg    MCHC 32 4 31 4 - 37 4 g/dL    RDW 13 2 11 6 - 15 1 %    MPV 9 3 8 9 - 12 7 fL    Platelets 324 158 - 142 Thousands/uL    nRBC 0 /100 WBCs    Neutrophils Relative 77 (H) 43 - 75 %    Immat GRANS % 0 0 - 2 %    Lymphocytes Relative 14 14 - 44 %    Monocytes Relative 7 4 - 12 %    Eosinophils Relative 1 0 - 6 %    Basophils Relative 1 0 - 1 %    Neutrophils Absolute 6 61 1 85 - 7 62 Thousands/µL    Immature Grans Absolute 0 02 0 00 - 0 20 Thousand/uL    Lymphocytes Absolute 1 19 0 60 - 4 47 Thousands/µL    Monocytes Absolute 0 60 0 17 - 1 22 Thousand/µL    Eosinophils Absolute 0 10 0 00 - 0 61 Thousand/µL    Basophils Absolute 0 05 0 00 - 0 10 Thousands/µL   Protime-INR   Result Value Ref Range    Protime 13 5 11 6 - 14 5 seconds    INR 1 01 0 84 - 1 19   APTT   Result Value Ref Range    PTT 30 23 - 37 seconds "  Basic metabolic panel   Result Value Ref Range    Sodium 139 135 - 147 mmol/L    Potassium 3 9 3 5 - 5 3 mmol/L    Chloride 105 96 - 108 mmol/L    CO2 27 21 - 32 mmol/L    ANION GAP 7 4 - 13 mmol/L    BUN 12 5 - 25 mg/dL    Creatinine 0 76 0 60 - 1 30 mg/dL    Glucose 108 65 - 140 mg/dL    Calcium 9 8 8 4 - 10 2 mg/dL    eGFR 80 ml/min/1 73sq m   Magnesium   Result Value Ref Range    Magnesium 2 2 1 9 - 2 7 mg/dL   TSH   Result Value Ref Range    TSH 3RD GENERATON 2 077 0 450 - 4 500 uIU/mL   HS Troponin 0hr (reflex protocol)   Result Value Ref Range    hs TnI 0hr 2 \"Refer to ACS Flowchart\"- see link ng/L   ECG 12 lead   Result Value Ref Range    Ventricular Rate 72 BPM    Atrial Rate 72 BPM    IN Interval 160 ms    QRSD Interval 80 ms    QT Interval 400 ms    QTC Interval 438 ms    P McClure 53 degrees    QRS Axis 28 degrees    T Wave Axis -18 degrees       No orders of the defined types were placed in this encounter        ALLERGIES:  Allergies   Allergen Reactions   • Bee Venom Anaphylaxis     Hornets, wasp, and bees   • Codeine Nausea Only and Vomiting     Reaction Date: 24Aug2011;    • Other      Narcotic ---nausea and vomiting    • Oxycodone-Acetaminophen Nausea Only and Vomiting     Reaction Date: 24Aug2011;    • Vicodin  [Hydrocodone-Acetaminophen] Nausea Only and Vomiting     Reaction Date: 24Aug2011;    • Sulfa Antibiotics Rash       Current Medications     Current Outpatient Medications   Medication Sig Dispense Refill   • Biotin 1000 MCG tablet Take 1,000 mcg by mouth daily     • budesonide-formoterol (SYMBICORT) 160-4 5 mcg/act inhaler Inhale daily      • co-enzyme Q-10 30 MG capsule Take 30 mg by mouth 3 (three) times a day     • EPINEPHrine (EPIPEN) 0 3 mg/0 3 mL SOAJ Inject 0 3 mL as directed     • fluticasone (FLONASE) 50 mcg/act nasal spray 2 sprays into each nostril daily     • levothyroxine 25 mcg tablet TAKE 1 TABLET MONDAY -     FRIDAY AND 2 TABLETS       SATURDAY - SUNDAY 116 tablet 3   • " loratadine (CLARITIN) 10 mg tablet Take 1 tablet by mouth daily     • Multiple Vitamin (MULTI-VITAMIN DAILY PO) Take 1 tablet by mouth daily     • Omega-3 Fatty Acids (EQL OMEGA 3 FISH OIL) 1000 MG CAPS Take 1 capsule by mouth daily     • pantoprazole (PROTONIX) 40 mg tablet TAKE 1 TABLET DAILY 90 tablet 1   • simvastatin (ZOCOR) 20 mg tablet Take 1 tablet (20 mg total) by mouth daily 90 tablet 3     No current facility-administered medications for this visit           Health Maintenance     Health Maintenance   Topic Date Due   • Hepatitis C Screening  Never done   • COVID-19 Vaccine (5 - Booster) 06/17/2022   • BMI: Followup Plan  08/19/2022   • Fall Risk  08/18/2023   • Urinary Incontinence Screening  08/18/2023   • Medicare Annual Wellness Visit (AWV)  08/18/2023   • Colorectal Cancer Screening  12/20/2023   • Depression Screening  03/29/2024   • BMI: Adult  03/29/2024   • Breast Cancer Screening: Mammogram  06/20/2024   • Osteoporosis Screening  Completed   • Pneumococcal Vaccine: 65+ Years  Completed   • Influenza Vaccine  Completed   • HIB Vaccine  Aged Out   • IPV Vaccine  Aged Out   • Hepatitis A Vaccine  Aged Out   • Meningococcal ACWY Vaccine  Aged Out   • HPV Vaccine  Aged Out     Immunization History   Administered Date(s) Administered   • COVID-19 MODERNA VACC 0 5 ML IM 02/23/2021, 03/23/2021, 04/22/2022   • COVID-19, unspecified 03/20/2021   • H1N1, All Formulations 10/30/2009   • INFLUENZA 10/01/2015, 10/04/2016, 10/13/2021, 09/20/2022   • Influenza, high dose seasonal 0 7 mL 09/04/2020, 09/20/2022   • Influenza, seasonal, injectable 10/01/2008, 10/01/2016, 10/02/2017   • Influenza, seasonal, injectable, preservative free 10/01/2018   • Pneumococcal Conjugate 13-Valent 09/04/2020   • Pneumococcal Polysaccharide PPV23 11/20/2019   • Td (adult), adsorbed 06/01/1999   • Zoster 04/30/2014, 89/87/4866       Charu Griffin MD

## 2023-04-18 DIAGNOSIS — G89.29 CHRONIC BILATERAL THORACIC BACK PAIN: Primary | ICD-10-CM

## 2023-04-18 DIAGNOSIS — M54.6 CHRONIC BILATERAL THORACIC BACK PAIN: Primary | ICD-10-CM

## 2023-04-27 ENCOUNTER — EVALUATION (OUTPATIENT)
Dept: PHYSICAL THERAPY | Facility: REHABILITATION | Age: 68
End: 2023-04-27

## 2023-04-27 DIAGNOSIS — M54.6 CHRONIC BILATERAL THORACIC BACK PAIN: ICD-10-CM

## 2023-04-27 DIAGNOSIS — G89.29 CHRONIC BILATERAL THORACIC BACK PAIN: ICD-10-CM

## 2023-04-27 NOTE — PROGRESS NOTES
PT Evaluation     Today's date: 2023  Patient name: Nathanael Alfredo  : 1955  MRN: [id number]  Referring provider: Kaz Altman MD  Dx:   Encounter Diagnosis     ICD-10-CM    1  Chronic bilateral thoracic back pain  M54 6 Ambulatory Referral to Physical Therapy    G89 29           Start Time: 1530  Stop Time: 1610  Total time in clinic (min): 40 minutes    Assessment  Assessment details: 69 y/o female with c/o b/l thoracic pain  The sx's started several months ago  She was at the ER two months ago for this  X-rays were performed of this area which were indicative of thoracic scoliosis and cervical stenosis  Pain ranges from 2/10 to 5/10  She has been getting massages monthly  She experiences relief for about a week, but the sx's return  She denies any HA's, hand weakness, or other red flag sx's  Sx's are aggravated with lifting, carrying, and caring for her      Impairments: abnormal muscle firing, abnormal muscle tone, abnormal or restricted ROM, activity intolerance, lacks appropriate home exercise program, pain with function and poor posture     Symptom irritability: lowUnderstanding of Dx/Px/POC: good   Prognosis: good    Goals  ST - I with HEP  2 - cx arom = wnl and pain-free      Plan  Patient would benefit from: skilled physical therapy  Planned therapy interventions: abdominal trunk stabilization, joint mobilization, manual therapy, neuromuscular re-education, patient education, strengthening, therapeutic activities, therapeutic exercise and home exercise program  Frequency: 1x week  Duration in weeks: 12  Treatment plan discussed with: patient        Subjective Evaluation    Quality of life: good    Pain  At best pain ratin  At worst pain ratin  Quality: tight, dull ache, discomfort and pulling  Relieving factors: rest, support and change in position  Aggravating factors: lifting and overhead activity    Social Support  Lives in: multiple-level home  Lives with: spouse      Diagnostic Tests  X-ray: abnormal  Treatments  No previous or current treatments  Patient Goals  Patient goals for therapy: increased motion, decreased pain, independence with ADLs/IADLs, increased strength and return to sport/leisure activities          Objective     Concurrent Complaints  Negative for dizziness, faints, headaches, trouble swallowing and history of trauma    Neurological Testing     Sensation   Cervical/Thoracic   Left   Intact: pin prick    Right   Intact: pin prick    Reflexes   Left   Biceps (C5/C6): trace (1+)    Right   Biceps (C5/C6): trace (1+)    Active Range of Motion   Cervical/Thoracic Spine       Cervical    Flexion:  with pain Restriction level: minimal  Extension:  with pain Restriction level: moderate  Left lateral flexion:  with pain Restriction level: moderate  Right lateral flexion:  Restriction level minimal  Left rotation:  Restriction level: minimal  Right rotation:  Restriction level: minimal    General Comments:      Cervical/Thoracic Comments  FOTO = 66    Soft tissue Palpation:  Increased tissue texture density of the UT, lev scap, and sylwia- scap muscles    (-) TTP of the thoracic SP    Observation:  B/l abducted scap      Neuro Exam:     Headaches   Patient reports headaches: No           Precautions: asthma  Nelbee [id number]    Manuals 04/27                                                                Neuro Re-Ed 04/27                                                                                                       Ther Ex 04/27            jesica row 11# - 2x11            Shoulder ext grn 2x9            Pt education LMR            HEP LMR                                                                Ther Activity                                       Gait Training                                       Modalities

## 2023-05-05 ENCOUNTER — APPOINTMENT (OUTPATIENT)
Dept: PHYSICAL THERAPY | Facility: REHABILITATION | Age: 68
End: 2023-05-05
Payer: MEDICARE

## 2023-05-11 ENCOUNTER — OFFICE VISIT (OUTPATIENT)
Dept: PHYSICAL THERAPY | Facility: REHABILITATION | Age: 68
End: 2023-05-11

## 2023-05-11 DIAGNOSIS — M54.6 CHRONIC BILATERAL THORACIC BACK PAIN: Primary | ICD-10-CM

## 2023-05-11 DIAGNOSIS — G89.29 CHRONIC BILATERAL THORACIC BACK PAIN: Primary | ICD-10-CM

## 2023-05-11 NOTE — PROGRESS NOTES
"Daily Note     Today's date: 2023  Patient name: Simona Burks  : 1955  MRN: [id number]  Referring provider: Taina Soto MD  Dx:   Encounter Diagnosis     ICD-10-CM    1  Chronic bilateral thoracic back pain  M54 6     G89 29                      Subjective: Pt reports she is having less pain since IE  Reports good compliance with HEP  Objective: See treatment diary below      Assessment: Tolerated treatment fair  Verbal cues throughout TE to slow pacing and for good technique  Patient would benefit from continued PT      Plan: Continue per plan of care  Progress treatment as tolerated         Precautions: asthma  [employer] [id number]    Manuals                                                                Neuro Re-Ed                                                                                                        Ther Ex             jesica row 11# - 2x11 11# 2x10           Shoulder ext grn 2x9 Blue  2x10           Pt education LMR            HEP LMR            UBE  3'/3'           TS ext in chair  10\"x10           Open books  10\"x10 ea           TB roll outs 3 way  10\"x5 ea           No money  GTB 2x10           Ther Activity                                       Gait Training                                       Modalities                                            "

## 2023-05-12 ENCOUNTER — APPOINTMENT (OUTPATIENT)
Dept: PHYSICAL THERAPY | Facility: REHABILITATION | Age: 68
End: 2023-05-12
Payer: MEDICARE

## 2023-05-19 ENCOUNTER — OFFICE VISIT (OUTPATIENT)
Dept: PHYSICAL THERAPY | Facility: REHABILITATION | Age: 68
End: 2023-05-19

## 2023-05-19 DIAGNOSIS — G89.29 CHRONIC BILATERAL THORACIC BACK PAIN: Primary | ICD-10-CM

## 2023-05-19 DIAGNOSIS — M54.6 CHRONIC BILATERAL THORACIC BACK PAIN: Primary | ICD-10-CM

## 2023-05-19 NOTE — PROGRESS NOTES
"Daily Note     Today's date: 2023  Patient name: Holly Krabbe  : 1955  MRN: [id number]  Referring provider: Renan Stroud MD  Dx:   Encounter Diagnosis     ICD-10-CM    1  Chronic bilateral thoracic back pain  M54 6     G89 29           Start Time: 1258  Stop Time: 1338  Total time in clinic (min): 40 minutes    Subjective: Pt chief complaint is that she is feeling tired today and that her arms are sore  She is the full time caregiver for her   Objective: See treatment diary below      Assessment: Tolerated treatment well  Discussed options for getting outside help in helping care for her , pt noted that she is not interested in it at this immediate time but will consider it when she feels she absolutely needs it  Progressed pt with ex's as indicated below with emphasis on scapular mobility and upper thoracic spine mobility, which pt tolerated well  Patient demonstrated fatigue post treatment, exhibited good technique with therapeutic exercises and would benefit from continued PT  Plan: Continue per plan of care        Precautions: asthma  [employer] [id number]    Manuals                                                                Neuro Re-Ed                                                                                                        Ther Ex             jesica row 11# - 2x11 11# 2x10 11# 2x10 3\"           Shoulder ext grn 2x9 Blue  2x10 Blue 2x10 3\"           Pt education LMR            HEP LMR            UBE  3'/3' 3'/3'           TS ext in chair  10\"x10 10\"x10          Open books  10\"x10 ea 10\"x10          TB roll outs 3 way  10\"x5 ea 10x5\" ea           B/L shoulder flexion wall slides w/ towel    For postural stretching 10x5\"           B/L shoulder flexion \"Y\" wall slides with liftoff   10x           Mat table Alt shoulder taps   20x Alt           Countertop/mat table rows    2x10 ea           Counterop/mat table T's, Ext's    10x ea     " "      No money  GTB 2x10 GTB 2x10 3\"           Ther Activity                                       Gait Training                                       Modalities                                            "

## 2023-05-26 ENCOUNTER — OFFICE VISIT (OUTPATIENT)
Dept: PHYSICAL THERAPY | Facility: REHABILITATION | Age: 68
End: 2023-05-26

## 2023-05-26 DIAGNOSIS — G89.29 CHRONIC BILATERAL THORACIC BACK PAIN: Primary | ICD-10-CM

## 2023-05-26 DIAGNOSIS — M54.6 CHRONIC BILATERAL THORACIC BACK PAIN: Primary | ICD-10-CM

## 2023-05-26 NOTE — PROGRESS NOTES
"Daily Note     Today's date: 2023  Patient name: Ramez Foss  : 1955  MRN: [id number]  Referring provider: Eh Mcknight MD  Dx:   Encounter Diagnosis     ICD-10-CM    1  Chronic bilateral thoracic back pain  M54 6     G89 29           Start Time: 1300  Stop Time: 1345  Total time in clinic (min): 45 minutes    Subjective: Patient reports she is tired today, otherwise reports her back feels ok  Objective: See treatment diary below      Assessment: Patient tolerated activities well  Patient was without symptoms during session  Progressed activities without issue  Patient would benefit from continued PT      Plan: Continue per plan of care        Precautions: asthma  [employer] [id number]    Manuals                                                              Neuro Re-Ed                                                                                                        Ther Ex             jesica row 11# - 2x11 11# 2x10 11# 2x10 3\"  13# 2x10 3\"         Shoulder ext grn 2x9 Blue  2x10 Blue 2x10 3\"  Blue 2x1 3\" hold         Pt education LMR            HEP LMR            UBE  3'/3' 3'/3'  3'/3'         TS ext in chair  10\"x10 10\"x10 x20 3\" hold         Open books  10\"x10 ea 10\"x10 x10 ea 5\" hold         TB roll outs 3 way  10\"x5 ea 10x5\" ea  10x3\" hold ea way         B/L shoulder flexion wall slides w/ towel    For postural stretching 10x5\"  For postural stretching 20x3\"         B/L shoulder flexion \"Y\" wall slides with liftoff   10x  2x10         Mat table Alt shoulder taps   20x Alt           Countertop/mat table rows    2x10 ea           Counterop/mat table T's, Ext's    10x ea           No money  GTB 2x10 GTB 2x10 3\"  btb 2x10 3\" hold         XS Shdr Ext Iso Stepback    grn x15 5\" hold         Ther Activity                                       Gait Training                                       Modalities                                            "

## 2023-06-01 ENCOUNTER — OFFICE VISIT (OUTPATIENT)
Dept: PHYSICAL THERAPY | Facility: REHABILITATION | Age: 68
End: 2023-06-01

## 2023-06-01 DIAGNOSIS — G89.29 CHRONIC BILATERAL THORACIC BACK PAIN: Primary | ICD-10-CM

## 2023-06-01 DIAGNOSIS — M54.6 CHRONIC BILATERAL THORACIC BACK PAIN: Primary | ICD-10-CM

## 2023-06-01 NOTE — PROGRESS NOTES
"Daily Note     Today's date: 2023  Patient name: Desiree Lopez  : 1955  MRN: [id number]  Referring provider: Destini Fleming MD  Dx:   Encounter Diagnosis     ICD-10-CM    1  Chronic bilateral thoracic back pain  M54 6     G89 29           Start Time: 1400  Stop Time: 1445  Total time in clinic (min): 45 minutes    Subjective: Patient reports she is feeling ok today  Objective: See treatment diary below      Assessment: Patient tolerated activities very well today  Continue to progress as tolerated  Patient would benefit from continued PT      Plan: Continue per plan of care        Precautions: asthma  [employer] [id number]    Manuals                                                             Neuro Re-Ed                                                                                                        Ther Ex             jesica row 11# - 2x11 11# 2x10 11# 2x10 3\"  13# 2x10 3\" 13# 2x10 3\"         Shoulder ext grn 2x9 Blue  2x10 Blue 2x10 3\"  Blue 2x10 3\" hold xs grn 2x10        Pt education LMR            HEP LMR            UBE  3'/3' 3'/3'  3'/3' 3'/3'        TS ext in chair  10\"x10 10\"x10 x20 3\" hold x20 3\" hold        Open books  10\"x10 ea 10\"x10 x10 ea 5\" hold x15 ea 5\" hold        TB roll outs 3 way  10\"x5 ea 10x5\" ea  10x3\" hold ea way 10x3\" hold ea way        B/L shoulder flexion wall slides w/ towel    For postural stretching 10x5\"  For postural stretching 20x3\" For postural stretching 20x3\"        B/L shoulder flexion \"Y\" wall slides with liftoff   10x  2x10 2x10        Mat table Alt shoulder taps   20x Alt           Countertop/mat table rows    2x10 ea           Counterop/mat table T's, Ext's    10x ea           No money  GTB 2x10 GTB 2x10 3\"  btb 2x10 3\" hold btb 2x10 3\" hold        XS Shdr Ext Iso Stepback    grn x15 5\" hold grn x15 5\" hold        Ther Activity                                       Gait Training                                     " Modalities

## 2023-06-09 ENCOUNTER — OFFICE VISIT (OUTPATIENT)
Dept: PHYSICAL THERAPY | Facility: REHABILITATION | Age: 68
End: 2023-06-09
Payer: MEDICARE

## 2023-06-09 DIAGNOSIS — G89.29 CHRONIC BILATERAL THORACIC BACK PAIN: Primary | ICD-10-CM

## 2023-06-09 DIAGNOSIS — M54.6 CHRONIC BILATERAL THORACIC BACK PAIN: Primary | ICD-10-CM

## 2023-06-09 PROCEDURE — 97112 NEUROMUSCULAR REEDUCATION: CPT

## 2023-06-09 PROCEDURE — 97110 THERAPEUTIC EXERCISES: CPT

## 2023-06-09 NOTE — PROGRESS NOTES
"Daily Note     Today's date: 2023  Patient name: Riddhi Francois  : 1955  MRN: [id number]  Referring provider: Domenica Cooper MD  Dx:   Encounter Diagnosis     ICD-10-CM    1  Chronic bilateral thoracic back pain  M54 6     G89 29           Start Time: 1345  Stop Time: 1425  Total time in clinic (min): 40 minutes    Subjective: Patient reports her back is doing well today  Patient reports she is doing better overall since start of PT  Objective: See treatment diary below      Assessment: Patient tolerated all activities very well  Patient had no symptoms throughout session  Continue to progress as tolerated  Patient would benefit from continued PT      Plan: Continue per plan of care        Precautions: asthma  Medbridge [id number]    Manuals                                               FOTO      perf       Neuro Re-Ed                                                                                                        Ther Ex             jesica row 11# - 2x11 11# 2x10 11# 2x10 3\"  13# 2x10 3\" 13# 2x10 3\"  15# 2x10 3\" hold       Shoulder ext grn 2x9 Blue  2x10 Blue 2x10 3\"  Blue 2x10 3\" hold xs grn 2x10 xs grn 2x10       Pt education LMR            HEP LMR            UBE  3'/3' 3'/3'  3'/3' 3'/3' np resume       TS ext in chair  10\"x10 10\"x10 x20 3\" hold x20 3\" hold x20 3\" hold       Open books  10\"x10 ea 10\"x10 x10 ea 5\" hold x15 ea 5\" hold x15 ea 5\" hold       TB roll outs 3 way  10\"x5 ea 10x5\" ea  10x3\" hold ea way 10x3\" hold ea way 10x3\" hold ea way       B/L shoulder flexion wall slides w/ towel    For postural stretching 10x5\"  For postural stretching 20x3\" For postural stretching 20x3\" For postural stretching 20x3\"       B/L shoulder flexion \"Y\" wall slides with liftoff   10x  2x10 2x10 np resume       Mat table Alt shoulder taps   20x Alt           Countertop/mat table rows    2x10 ea           Counterop/mat table T's, Ext's    10x ea           No " "money  GTB 2x10 GTB 2x10 3\"  btb 2x10 3\" hold btb 2x10 3\" hold btb 2x10 3\" hold       XS Shdr Ext Iso Stepback    grn x15 5\" hold grn x15 5\" hold grn x20 5\" hold       Ther Activity                                       Gait Training                                       Modalities                                            "

## 2023-06-15 ENCOUNTER — OFFICE VISIT (OUTPATIENT)
Dept: PHYSICAL THERAPY | Facility: REHABILITATION | Age: 68
End: 2023-06-15
Payer: MEDICARE

## 2023-06-15 DIAGNOSIS — M54.6 CHRONIC BILATERAL THORACIC BACK PAIN: Primary | ICD-10-CM

## 2023-06-15 DIAGNOSIS — G89.29 CHRONIC BILATERAL THORACIC BACK PAIN: Primary | ICD-10-CM

## 2023-06-15 PROCEDURE — 97110 THERAPEUTIC EXERCISES: CPT | Performed by: PHYSICAL THERAPIST

## 2023-06-15 PROCEDURE — 97112 NEUROMUSCULAR REEDUCATION: CPT | Performed by: PHYSICAL THERAPIST

## 2023-06-15 NOTE — PROGRESS NOTES
"Daily Note     Today's date: 6/15/2023  Patient name: Gregoria Ryan  : 1955  MRN: [id number]  Referring provider: Hua Brownlee MD  Dx:   Encounter Diagnosis     ICD-10-CM    1  Chronic bilateral thoracic back pain  M54 6     G89 29           Start Time: 1145  Stop Time: 1215  Total time in clinic (min): 30 minutes    Subjective: Pt denies pain at rest   She still experiences pain during lifting, but she is more conscientious of her form  Objective: See treatment diary below  HEP updated with standing postural awareness against wall and scap retractions with hands behind head  Resistance of current exercises progressed  Assessment: Tolerated treatment well  Patient exhibited good technique with therapeutic exercises    Plan: f/u in 4-5 weeks  expected d/c at that time        Precautions: asthma  Medbridge [id number]    Manuals 04/27 5/11 5/19  5/26 6/1 6/9 06/15      Tissue deformation - right UT       LMR      Gr 4 inf CTJ mobs u/l seated       LMR                   FOTO      perf       Neuro Re-Ed 04/27      06/15                   Standing posture at wall       45\"x3      scap retractions - hands behind head       5\" x 15                                                          Ther Ex 04/27      06/15      jesica row 11# - 2x11 11# 2x10 11# 2x10 3\"  13# 2x10 3\" 13# 2x10 3\"  15# 2x10 3\" hold       Shoulder ext grn 2x9 Blue  2x10 Blue 2x10 3\"  Blue 2x10 3\" hold xs grn 2x10 xs grn 2x10       Pt education LMR      LMR      HEP LMR      LMR      UBE  3'/3' 3'/3'  3'/3' 3'/3' np resume 4'      TS ext in chair  10\"x10 10\"x10 x20 3\" hold x20 3\" hold x20 3\" hold       Open books  10\"x10 ea 10\"x10 x10 ea 5\" hold x15 ea 5\" hold x15 ea 5\" hold       TB roll outs 3 way  10\"x5 ea 10x5\" ea  10x3\" hold ea way 10x3\" hold ea way 10x3\" hold ea way       B/L shoulder flexion wall slides w/ towel    For postural stretching 10x5\"  For postural stretching 20x3\" For postural stretching 20x3\" For postural stretching " "20x3\"       B/L shoulder flexion \"Y\" wall slides with liftoff   10x  2x10 2x10 np resume       Mat table Alt shoulder taps   20x Alt           Countertop/mat table rows    2x10 ea           Counterop/mat table T's, Ext's    10x ea           No money  GTB 2x10 GTB 2x10 3\"  btb 2x10 3\" hold btb 2x10 3\" hold btb 2x10 3\" hold       XS Shdr Ext Iso Stepback    grn x15 5\" hold grn x15 5\" hold grn x20 5\" hold       Ther Activity                                       Gait Training                                       Modalities                                            "

## 2023-06-22 ENCOUNTER — APPOINTMENT (OUTPATIENT)
Dept: PHYSICAL THERAPY | Facility: REHABILITATION | Age: 68
End: 2023-06-22
Payer: MEDICARE

## 2023-07-06 ENCOUNTER — HOSPITAL ENCOUNTER (OUTPATIENT)
Dept: MAMMOGRAPHY | Facility: MEDICAL CENTER | Age: 68
Discharge: HOME | End: 2023-07-06
Payer: MEDICARE

## 2023-07-06 VITALS — HEIGHT: 62 IN | BODY MASS INDEX: 34.6 KG/M2 | WEIGHT: 188 LBS

## 2023-07-06 DIAGNOSIS — Z12.31 ENCOUNTER FOR SCREENING MAMMOGRAM FOR MALIGNANT NEOPLASM OF BREAST: ICD-10-CM

## 2023-07-06 PROCEDURE — 77063 BREAST TOMOSYNTHESIS BI: CPT

## 2023-07-06 PROCEDURE — 77067 SCR MAMMO BI INCL CAD: CPT

## 2023-07-17 ENCOUNTER — OFFICE VISIT (OUTPATIENT)
Dept: PHYSICAL THERAPY | Facility: REHABILITATION | Age: 68
End: 2023-07-17
Payer: MEDICARE

## 2023-07-17 DIAGNOSIS — M54.6 CHRONIC BILATERAL THORACIC BACK PAIN: Primary | ICD-10-CM

## 2023-07-17 DIAGNOSIS — G89.29 CHRONIC BILATERAL THORACIC BACK PAIN: Primary | ICD-10-CM

## 2023-07-17 PROCEDURE — 97140 MANUAL THERAPY 1/> REGIONS: CPT

## 2023-07-17 NOTE — PROGRESS NOTES
Discharge Note     Today's date: 2023  Patient name: Ayad Jaffe  : 1955  MRN: [id number]  Referring provider: El Aguirre MD  Dx:   Encounter Diagnosis     ICD-10-CM    1. Chronic bilateral thoracic back pain  M54.6     G89.29           Start Time: 1200  Stop Time: 1230  Total time in clinic (min): 30 minutes    Subjective: Patient reports she is doing much better since the start of PT and is glad she came in. Patient reports no pain in her back over the last few weeks and has been performing her HEP every other day with good relief. Patient reports she feels ready to d/c to HEP at this time. Objective: See treatment diary below      Assessment:  Patient has actively participated in 8 session of skilled physical therapy is overall significantly improved. FOTO score improved from a  66 to 98. Patient is having minimal no/pain at this time. Patient is fully independent and compliant with HEP. Will transition to HEP. Patient will f/u with PT as needed. Plan: Continue per plan of care.       Precautions: asthma  Medbridge [id number]    Manuals  6 6/9 06/15 7/17     Tissue deformation - right UT       LMR      Gr 4 inf CTJ mobs u/l seated       LMR      Re-Evaluation        30'     FOTO      perf  perf     Neuro Re-Ed 04/27      06/15                   Standing posture at wall       45"x3      scap retractions - hands behind head       5" x 15                                                          Ther Ex 04/27      06/15      jesica row 11# - 2x11 11# 2x10 11# 2x10 3"  13# 2x10 3" 13# 2x10 3"  15# 2x10 3" hold       Shoulder ext grn 2x9 Blue  2x10 Blue 2x10 3"  Blue 2x10 3" hold xs grn 2x10 xs grn 2x10       Pt education LMR      LMR PRR     HEP LMR      LMR      UBE  3'/3' 3'/3'  3'/3' 3'/3' np resume 4'      TS ext in chair  10"x10 10"x10 x20 3" hold x20 3" hold x20 3" hold       Open books  10"x10 ea 10"x10 x10 ea 5" hold x15 ea 5" hold x15 ea 5" hold       TB roll outs 3 way  10"x5 ea 10x5" ea  10x3" hold ea way 10x3" hold ea way 10x3" hold ea way       B/L shoulder flexion wall slides w/ towel    For postural stretching 10x5"  For postural stretching 20x3" For postural stretching 20x3" For postural stretching 20x3"       B/L shoulder flexion "Y" wall slides with liftoff   10x  2x10 2x10 np resume       Mat table Alt shoulder taps   20x Alt           Countertop/mat table rows    2x10 ea           Counterop/mat table T's, Ext's    10x ea           No money  GTB 2x10 GTB 2x10 3"  btb 2x10 3" hold btb 2x10 3" hold btb 2x10 3" hold       XS Shdr Ext Iso Stepback    grn x15 5" hold grn x15 5" hold grn x20 5" hold       Ther Activity                                       Gait Training                                       Modalities

## 2023-08-17 ENCOUNTER — TELEPHONE (OUTPATIENT)
Dept: SLEEP CENTER | Facility: CLINIC | Age: 68
End: 2023-08-17

## 2023-08-20 DIAGNOSIS — K21.9 GASTROESOPHAGEAL REFLUX DISEASE WITHOUT ESOPHAGITIS: ICD-10-CM

## 2023-08-20 RX ORDER — PANTOPRAZOLE SODIUM 40 MG/1
TABLET, DELAYED RELEASE ORAL
Qty: 90 TABLET | Refills: 1 | Status: SHIPPED | OUTPATIENT
Start: 2023-08-20

## 2023-09-27 ENCOUNTER — OFFICE VISIT (OUTPATIENT)
Dept: FAMILY MEDICINE CLINIC | Facility: CLINIC | Age: 68
End: 2023-09-27
Payer: MEDICARE

## 2023-09-27 VITALS
HEIGHT: 62 IN | BODY MASS INDEX: 34.44 KG/M2 | TEMPERATURE: 97.8 F | DIASTOLIC BLOOD PRESSURE: 80 MMHG | RESPIRATION RATE: 17 BRPM | WEIGHT: 187.13 LBS | SYSTOLIC BLOOD PRESSURE: 120 MMHG | HEART RATE: 68 BPM | OXYGEN SATURATION: 95 %

## 2023-09-27 DIAGNOSIS — Z23 INFLUENZA VACCINE NEEDED: ICD-10-CM

## 2023-09-27 DIAGNOSIS — G89.29 CHRONIC BILATERAL THORACIC BACK PAIN: ICD-10-CM

## 2023-09-27 DIAGNOSIS — E11.8 TYPE 2 DIABETES MELLITUS WITH COMPLICATION, WITHOUT LONG-TERM CURRENT USE OF INSULIN (HCC): ICD-10-CM

## 2023-09-27 DIAGNOSIS — K21.00 GASTROESOPHAGEAL REFLUX DISEASE WITH ESOPHAGITIS WITHOUT HEMORRHAGE: ICD-10-CM

## 2023-09-27 DIAGNOSIS — M54.6 CHRONIC BILATERAL THORACIC BACK PAIN: ICD-10-CM

## 2023-09-27 DIAGNOSIS — E78.5 HYPERLIPIDEMIA, UNSPECIFIED HYPERLIPIDEMIA TYPE: ICD-10-CM

## 2023-09-27 DIAGNOSIS — Z00.00 MEDICARE ANNUAL WELLNESS VISIT, SUBSEQUENT: Primary | ICD-10-CM

## 2023-09-27 DIAGNOSIS — E03.9 HYPOTHYROIDISM, UNSPECIFIED TYPE: ICD-10-CM

## 2023-09-27 PROCEDURE — G0439 PPPS, SUBSEQ VISIT: HCPCS | Performed by: FAMILY MEDICINE

## 2023-09-27 PROCEDURE — 90662 IIV NO PRSV INCREASED AG IM: CPT

## 2023-09-27 PROCEDURE — 99214 OFFICE O/P EST MOD 30 MIN: CPT | Performed by: FAMILY MEDICINE

## 2023-09-27 PROCEDURE — G0008 ADMIN INFLUENZA VIRUS VAC: HCPCS

## 2023-09-27 NOTE — ASSESSMENT & PLAN NOTE
Back pain.   Patient will consider joining physical therapy gym for maintenance exercises to prevent recurrence of previous back pain

## 2023-09-27 NOTE — PATIENT INSTRUCTIONS
Medicare Preventive Visit Patient Instructions  Thank you for completing your Welcome to Medicare Visit or Medicare Annual Wellness Visit today. Your next wellness visit will be due in one year (9/27/2024). The screening/preventive services that you may require over the next 5-10 years are detailed below. Some tests may not apply to you based off risk factors and/or age. Screening tests ordered at today's visit but not completed yet may show as past due. Also, please note that scanned in results may not display below. Preventive Screenings:  Service Recommendations Previous Testing/Comments   Colorectal Cancer Screening  * Colonoscopy    * Fecal Occult Blood Test (FOBT)/Fecal Immunochemical Test (FIT)  * Fecal DNA/Cologuard Test  * Flexible Sigmoidoscopy Age: 43-73 years old   Colonoscopy: every 10 years (may be performed more frequently if at higher risk)  OR  FOBT/FIT: every 1 year  OR  Cologuard: every 3 years  OR  Sigmoidoscopy: every 5 years  Screening may be recommended earlier than age 39 if at higher risk for colorectal cancer. Also, an individualized decision between you and your healthcare provider will decide whether screening between the ages of 77-80 would be appropriate. Colonoscopy: 12/20/2018  FOBT/FIT: Not on file  Cologuard: Not on file  Sigmoidoscopy: Not on file    Screening Current     Breast Cancer Screening Age: 36 years old  Frequency: every 1-2 years  Not required if history of left and right mastectomy Mammogram: 07/06/2023    Screening Current   Cervical Cancer Screening Between the ages of 21-29, pap smear recommended once every 3 years. Between the ages of 32-69, can perform pap smear with HPV co-testing every 5 years.    Recommendations may differ for women with a history of total hysterectomy, cervical cancer, or abnormal pap smears in past. Pap Smear: Not on file    Screening Not Indicated   Hepatitis C Screening Once for adults born between 1945 and 1965  More frequently in patients at high risk for Hepatitis C Hep C Antibody: Not on file        Diabetes Screening 1-2 times per year if you're at risk for diabetes or have pre-diabetes Fasting glucose: 99 mg/dL (6/18/2022)  A1C: 6.0 % (6/18/2022)  Screening Current   Cholesterol Screening Once every 5 years if you don't have a lipid disorder. May order more often based on risk factors. Lipid panel: 06/18/2022    Screening Not Indicated  History Lipid Disorder     Other Preventive Screenings Covered by Medicare:  1. Abdominal Aortic Aneurysm (AAA) Screening: covered once if your at risk. You're considered to be at risk if you have a family history of AAA. 2. Lung Cancer Screening: covers low dose CT scan once per year if you meet all of the following conditions: (1) Age 48-67; (2) No signs or symptoms of lung cancer; (3) Current smoker or have quit smoking within the last 15 years; (4) You have a tobacco smoking history of at least 20 pack years (packs per day multiplied by number of years you smoked); (5) You get a written order from a healthcare provider. 3. Glaucoma Screening: covered annually if you're considered high risk: (1) You have diabetes OR (2) Family history of glaucoma OR (3)  aged 48 and older OR (3)  American aged 72 and older  3. Osteoporosis Screening: covered every 2 years if you meet one of the following conditions: (1) You're estrogen deficient and at risk for osteoporosis based off medical history and other findings; (2) Have a vertebral abnormality; (3) On glucocorticoid therapy for more than 3 months; (4) Have primary hyperparathyroidism; (5) On osteoporosis medications and need to assess response to drug therapy. · Last bone density test (DXA Scan): 11/07/2022.  5. HIV Screening: covered annually if you're between the age of 15-65. Also covered annually if you are younger than 13 and older than 72 with risk factors for HIV infection.  For pregnant patients, it is covered up to 3 times per pregnancy. Immunizations:  Immunization Recommendations   Influenza Vaccine Annual influenza vaccination during flu season is recommended for all persons aged >= 6 months who do not have contraindications   Pneumococcal Vaccine   * Pneumococcal conjugate vaccine = PCV13 (Prevnar 13), PCV15 (Vaxneuvance), PCV20 (Prevnar 20)  * Pneumococcal polysaccharide vaccine = PPSV23 (Pneumovax) Adults 20-63 years old: 1-3 doses may be recommended based on certain risk factors  Adults 72 years old: 1-2 doses may be recommended based off what pneumonia vaccine you previously received   Hepatitis B Vaccine 3 dose series if at intermediate or high risk (ex: diabetes, end stage renal disease, liver disease)   Tetanus (Td) Vaccine - COST NOT COVERED BY MEDICARE PART B Following completion of primary series, a booster dose should be given every 10 years to maintain immunity against tetanus. Td may also be given as tetanus wound prophylaxis. Tdap Vaccine - COST NOT COVERED BY MEDICARE PART B Recommended at least once for all adults. For pregnant patients, recommended with each pregnancy. Shingles Vaccine (Shingrix) - COST NOT COVERED BY MEDICARE PART B  2 shot series recommended in those aged 48 and above     Health Maintenance Due:      Topic Date Due   • Hepatitis C Screening  Never done   • Colorectal Cancer Screening  12/20/2023   • Breast Cancer Screening: Mammogram  07/06/2025     Immunizations Due:      Topic Date Due   • COVID-19 Vaccine (5 - Mixed Product series) 06/17/2022   • Influenza Vaccine (1) 09/01/2023     Advance Directives   What are advance directives? Advance directives are legal documents that state your wishes and plans for medical care. These plans are made ahead of time in case you lose your ability to make decisions for yourself. Advance directives can apply to any medical decision, such as the treatments you want, and if you want to donate organs. What are the types of advance directives?   There are many types of advance directives, and each state has rules about how to use them. You may choose a combination of any of the following:  · Living will: This is a written record of the treatment you want. You can also choose which treatments you do not want, which to limit, and which to stop at a certain time. This includes surgery, medicine, IV fluid, and tube feedings. · Durable power of  for healthcare [facility]): This is a written record that states who you want to make healthcare choices for you when you are unable to make them for yourself. This person, called a proxy, is usually a family member or a friend. You may choose more than 1 proxy. · Do not resuscitate (DNR) order:  A DNR order is used in case your heart stops beating or you stop breathing. It is a request not to have certain forms of treatment, such as CPR. A DNR order may be included in other types of advance directives. · Medical directive: This covers the care that you want if you are in a coma, near death, or unable to make decisions for yourself. You can list the treatments you want for each condition. Treatment may include pain medicine, surgery, blood transfusions, dialysis, IV or tube feedings, and a ventilator (breathing machine). · Values history: This document has questions about your views, beliefs, and how you feel and think about life. This information can help others choose the care that you would choose. Why are advance directives important? An advance directive helps you control your care. Although spoken wishes may be used, it is better to have your wishes written down. Spoken wishes can be misunderstood, or not followed. Treatments may be given even if you do not want them. An advance directive may make it easier for your family to make difficult choices about your care.    Weight Management   Why it is important to manage your weight:  Being overweight increases your risk of health conditions such as heart disease, high blood pressure, type 2 diabetes, and certain types of cancer. It can also increase your risk for osteoarthritis, sleep apnea, and other respiratory problems. Aim for a slow, steady weight loss. Even a small amount of weight loss can lower your risk of health problems. How to lose weight safely:  A safe and healthy way to lose weight is to eat fewer calories and get regular exercise. You can lose up about 1 pound a week by decreasing the number of calories you eat by 500 calories each day. Healthy meal plan for weight management:  A healthy meal plan includes a variety of foods, contains fewer calories, and helps you stay healthy. A healthy meal plan includes the following:  · Eat whole-grain foods more often. A healthy meal plan should contain fiber. Fiber is the part of grains, fruits, and vegetables that is not broken down by your body. Whole-grain foods are healthy and provide extra fiber in your diet. Some examples of whole-grain foods are whole-wheat breads and pastas, oatmeal, brown rice, and bulgur. · Eat a variety of vegetables every day. Include dark, leafy greens such as spinach, kale, glenroy greens, and mustard greens. Eat yellow and orange vegetables such as carrots, sweet potatoes, and winter squash. · Eat a variety of fruits every day. Choose fresh or canned fruit (canned in its own juice or light syrup) instead of juice. Fruit juice has very little or no fiber. · Eat low-fat dairy foods. Drink fat-free (skim) milk or 1% milk. Eat fat-free yogurt and low-fat cottage cheese. Try low-fat cheeses such as mozzarella and other reduced-fat cheeses. · Choose meat and other protein foods that are low in fat. Choose beans or other legumes such as split peas or lentils. Choose fish, skinless poultry (chicken or turkey), or lean cuts of red meat (beef or pork). Before you cook meat or poultry, cut off any visible fat. · Use less fat and oil. Try baking foods instead of frying them. Add less fat, such as margarine, sour cream, regular salad dressing and mayonnaise to foods. Eat fewer high-fat foods. Some examples of high-fat foods include french fries, doughnuts, ice cream, and cakes. · Eat fewer sweets. Limit foods and drinks that are high in sugar. This includes candy, cookies, regular soda, and sweetened drinks. Exercise:  Exercise at least 30 minutes per day on most days of the week. Some examples of exercise include walking, biking, dancing, and swimming. You can also fit in more physical activity by taking the stairs instead of the elevator or parking farther away from stores. Ask your healthcare provider about the best exercise plan for you. © Copyright [employer] 2018 Information is for End User's use only and may not be sold, redistributed or otherwise used for commercial purposes.  All illustrations and images included in CareNotes® are the copyrighted property of [employer] or [address] [place] [address]

## 2023-09-27 NOTE — PROGRESS NOTES
Assessment and Plan:     Problem List Items Addressed This Visit        Digestive    Esophagitis, reflux     GERD. Patient feels symptoms are fairly well controlled on current dose of pantoprazole 40 mg daily            Endocrine    Hypothyroidism     Hypothyroidism. Patient will check TSH blood work and continue her current dose of levothyroxine         Relevant Orders    CBC    Comprehensive metabolic panel    Lipid panel    TSH, 3rd generation       Other    Hyperlipidemia     Hyperlipidemia. Patient will check lipid panel blood work and continue current dose of statin therapy         Relevant Orders    CBC    Comprehensive metabolic panel    Lipid panel    TSH, 3rd generation    Chronic bilateral thoracic back pain     Back pain. Patient will consider joining physical therapy gym for maintenance exercises to prevent recurrence of previous back pain        Other Visit Diagnoses     Medicare annual wellness visit, subsequent    -  Primary    Type 2 diabetes mellitus with complication, without long-term current use of insulin ([address])        Influenza vaccine needed        Relevant Orders    influenza vaccine, high-dose, PF 0.7 mL (FLUZONE HIGH-DOSE) (Completed)           Preventive health issues were discussed with patient, and age appropriate screening tests were ordered as noted in patient's After Visit Summary. Personalized health advice and appropriate referrals for health education or preventive services given if needed, as noted in patient's After Visit Summary. History of Present Illness:     Patient presents for a Medicare Wellness Visit    Patient in the office to review chronic medical condition. Fortunately her previous back pain has improved with physical therapy. She is contemplating joining her physical therapy gym for maintenance exercises to prevent exacerbation of her back pain. Unfortunately patient's  requires constant assistance at home with ADLs.   Patient continues to be frustrated with the healthcare system in general in regards to making appointments and treatments for her . Patient Care Team:  James Henson MD as PCP - Lou Pimentel MD     Review of Systems:     Review of Systems   Constitutional: Negative. HENT: Negative. Eyes: Negative. Respiratory: Negative. Cardiovascular: Negative. Gastrointestinal: Negative. Genitourinary: Negative. Musculoskeletal: Negative. Skin: Negative. Neurological: Negative. Psychiatric/Behavioral: Negative. Problem List:     Patient Active Problem List   Diagnosis   • Hyperlipidemia   • Esophagitis, reflux   • Hypothyroidism   • EDEL (obstructive sleep apnea)   • Vitamin D deficiency   • Insufficient sleep syndrome   • Chronic rhinitis   • Mild intermittent asthma without complication   • Gastroesophageal reflux disease without esophagitis   • Obesity (BMI 30-39. 9)   • Palpitations   • Hyperglycemia   • Paresthesia   • Pruritus   • Obesity, morbid (HCC)   • Chronic bilateral thoracic back pain   • Hot flashes   • COVID-19 virus infection      Past Medical and Surgical History:     Past Medical History:   Diagnosis Date   • Allergic rhinitis    • Anaphylaxis    • Asthma    • GERD (gastroesophageal reflux disease)    • Glaucoma    • Hypothyroidism      Past Surgical History:   Procedure Laterality Date   • EYE SURGERY Left 06/08/2020     macular gap - 06/08/2020 & 11/02/2020      Family History:     Family History   Problem Relation Age of Onset   • Heart disease Mother    • Other Maternal Grandmother         Thyroid disorder   • No Known Problems Maternal Grandfather    • No Known Problems Paternal Grandmother    • No Known Problems Paternal Grandfather    • No Known Problems Father    • No Known Problems Son    • No Known Problems Son    • No Known Problems Son    • No Known Problems Brother    • No Known Problems Brother    • No Known Problems Maternal Aunt    • Breast cancer Paternal Aunt 52's   • Breast cancer Cousin         age unknown, 42's   • Breast cancer Cousin         early 52's      Social History:     Social History     Socioeconomic History   • Marital status: /Civil Union     Spouse name: None   • Number of children: None   • Years of education: None   • Highest education level: None   Occupational History   • None   Tobacco Use   • Smoking status: Never   • Smokeless tobacco: Never   Vaping Use   • Vaping Use: Never used   Substance and Sexual Activity   • Alcohol use: Yes     Comment: social   • Drug use: No   • Sexual activity: Not Currently   Other Topics Concern   • None   Social History Narrative   • None     Social Determinants of Health     Financial Resource Strain: Low Risk  (9/27/2023)    Overall Financial Resource Strain (CARDIA)    • Difficulty of Paying Living Expenses: Not very hard   Food Insecurity: Not on file   Transportation Needs: No Transportation Needs (9/27/2023)    PRAPARE - Transportation    • Lack of Transportation (Medical): No    • Lack of Transportation (Non-Medical):  No   Physical Activity: Not on file   Stress: Not on file   Social Connections: Not on file   Intimate Partner Violence: Not on file   Housing Stability: Not on file      Medications and Allergies:     Current Outpatient Medications   Medication Sig Dispense Refill   • Biotin 1000 MCG tablet Take 1,000 mcg by mouth daily     • budesonide-formoterol (SYMBICORT) 160-4.5 mcg/act inhaler Inhale daily      • co-enzyme Q-10 30 MG capsule Take 30 mg by mouth 3 (three) times a day     • fluticasone (FLONASE) 50 mcg/act nasal spray 2 sprays into each nostril daily     • levothyroxine 25 mcg tablet TAKE 1 TABLET MONDAY -     FRIDAY AND 2 TABLETS       SATURDAY - SUNDAY 116 tablet 3   • loratadine (CLARITIN) 10 mg tablet Take 1 tablet by mouth daily     • Multiple Vitamin (MULTI-VITAMIN DAILY PO) Take 1 tablet by mouth daily     • Omega-3 Fatty Acids (EQL OMEGA 3 FISH OIL) 1000 MG CAPS Take 1 capsule by mouth daily     • pantoprazole (PROTONIX) 40 mg tablet TAKE 1 TABLET DAILY 90 tablet 1   • simvastatin (ZOCOR) 20 mg tablet Take 1 tablet (20 mg total) by mouth daily 90 tablet 3   • EPINEPHrine (EPIPEN) 0.3 mg/0.3 mL SOAJ Inject 0.3 mL as directed (Patient not taking: Reported on 4/4/2023)       No current facility-administered medications for this visit. Allergies   Allergen Reactions   • Bee Venom Anaphylaxis     Hornets, wasp, and bees   • Codeine Nausea Only and Vomiting     Reaction Date: 24Aug2011;    • Other      Narcotic ---nausea and vomiting    • Oxycodone-Acetaminophen Nausea Only and Vomiting     Reaction Date: 24Aug2011;    • Vicodin  [Hydrocodone-Acetaminophen] Nausea Only and Vomiting     Reaction Date: 24Aug2011;    • Sulfa Antibiotics Rash      Immunizations:     Immunization History   Administered Date(s) Administered   • COVID-19 MODERNA VACC 0.5 ML IM 02/23/2021, 03/23/2021, 04/22/2022   • COVID-19, unspecified 03/20/2021   • H1N1, All Formulations 10/30/2009   • INFLUENZA 10/01/2015, 10/04/2016, 10/13/2021, 09/20/2022   • Influenza, high dose seasonal 0.7 mL 09/04/2020, 09/20/2022, 09/27/2023   • Influenza, seasonal, injectable 10/01/2008, 10/01/2016, 10/02/2017   • Influenza, seasonal, injectable, preservative free 10/01/2018   • Pneumococcal Conjugate 13-Valent 09/04/2020   • Pneumococcal Polysaccharide PPV23 11/20/2019   • Td (adult), adsorbed 06/01/1999   • Zoster 04/30/2014, 03/10/2015      Health Maintenance:         Topic Date Due   • Hepatitis C Screening  Never done   • Colorectal Cancer Screening  12/20/2023   • Breast Cancer Screening: Mammogram  07/06/2025         Topic Date Due   • COVID-19 Vaccine (5 - Mixed Product series) 06/17/2022      Medicare Screening Tests and Risk Assessments:         Health Risk Assessment:   Patient rates overall health as very good. Patient feels that their physical health rating is same. Patient is satisfied with their life.  Eyesight was rated as same. Hearing was rated as same. Patient feels that their emotional and mental health rating is same. Patients states they are never, rarely angry. Patient states they are never, rarely unusually tired/fatigued. Pain experienced in the last 7 days has been none. Depression Screening:   PHQ-2 Score: 0      Fall Risk Screening: In the past year, patient has experienced: no history of falling in past year      Urinary Incontinence Screening:   Patient has not leaked urine accidently in the last six months. Home Safety:  Patient does not have trouble with stairs inside or outside of their home. Patient has working smoke alarms and has working carbon monoxide detector. Home safety hazards include: none. Nutrition:   Current diet is Regular. Medications:   Patient is currently taking over-the-counter supplements. OTC medications include: see medication list. Patient is able to manage medications. Activities of Daily Living (ADLs)/Instrumental Activities of Daily Living (IADLs):   Walk and transfer into and out of bed and chair?: Yes  Dress and groom yourself?: Yes    Bathe or shower yourself?: Yes    Feed yourself?  Yes  Do your laundry/housekeeping?: Yes  Manage your money, pay your bills and track your expenses?: Yes  Make your own meals?: Yes    Do your own shopping?: Yes    Previous Hospitalizations:   Any hospitalizations or ED visits within the last 12 months?: No      Advance Care Planning:   Living will: Yes    Advanced directive: Yes      PREVENTIVE SCREENINGS      Cardiovascular Screening:    General: Screening Not Indicated, History Lipid Disorder and Risks and Benefits Discussed    Due for: Lipid Panel      Diabetes Screening:     General: Risks and Benefits Discussed    Due for: Blood Glucose      Colorectal Cancer Screening:     General: Screening Current      Breast Cancer Screening:     General: Screening Current      Cervical Cancer Screening:    General: Screening Not Indicated      Osteoporosis Screening:    General: Screening Not Indicated and History Osteoporosis      Lung Cancer Screening:     General: Screening Not Indicated      Preventive Screening Comments: The patient refused follow-up colonoscopy in December 2023. She was willing to consider Cologuard testing as an alternative. She is aware of limitations of this test in a patient that has had prior colon polyps. Screening, Brief Intervention, and Referral to Treatment (SBIRT)    Screening    Typical number of drinks in a week: 1    Single Item Drug Screening:  How often have you used an illegal drug (including marijuana) or a prescription medication for non-medical reasons in the past year? never    Single Item Drug Screen Score: 0  Interpretation: Negative screen for possible drug use disorder    No results found. Physical Exam:     /80   Pulse 68   Temp 97.8 °F (36.6 °C)   Resp 17   Ht 5' 2" (1.575 m)   Wt 84.9 kg (187 lb 2 oz)   SpO2 95%   BMI 34.23 kg/m²     Physical Exam  Vitals and nursing note reviewed. Constitutional:       General: She is not in acute distress. Appearance: Normal appearance. She is well-developed. She is not ill-appearing. HENT:      Head: Normocephalic and atraumatic. Eyes:      General:         Right eye: No discharge. Left eye: No discharge. Extraocular Movements: Extraocular movements intact. Conjunctiva/sclera: Conjunctivae normal.      Pupils: Pupils are equal, round, and reactive to light. Neck:      Vascular: No carotid bruit. Cardiovascular:      Rate and Rhythm: Normal rate and regular rhythm. Heart sounds: No murmur heard. Pulmonary:      Effort: Pulmonary effort is normal. No respiratory distress. Breath sounds: Normal breath sounds. Abdominal:      General: Abdomen is flat. Bowel sounds are normal.      Palpations: Abdomen is soft. Tenderness: There is no abdominal tenderness. There is no guarding or rebound. Musculoskeletal:      Right lower leg: No edema. Left lower leg: No edema. Lymphadenopathy:      Cervical: No cervical adenopathy. Skin:     General: Skin is warm and dry. Capillary Refill: Capillary refill takes less than 2 seconds. Neurological:      Mental Status: She is alert. Mental status is at baseline. Psychiatric:         Mood and Affect: Mood normal.         Behavior: Behavior normal.         Thought Content:  Thought content normal.         Judgment: Judgment normal.     I spent 30 minutes with this patient of which greater than 50% was spent counseling or reviewing chart    Beka Pena MD

## 2023-09-27 NOTE — ASSESSMENT & PLAN NOTE
GERD.  Patient feels symptoms are fairly well controlled on current dose of pantoprazole 40 mg daily

## 2023-09-27 NOTE — ASSESSMENT & PLAN NOTE
Hyperlipidemia.   Patient will check lipid panel blood work and continue current dose of statin therapy

## 2023-10-26 ENCOUNTER — APPOINTMENT (OUTPATIENT)
Dept: LAB | Facility: CLINIC | Age: 68
End: 2023-10-26
Payer: MEDICARE

## 2023-10-26 DIAGNOSIS — E03.9 HYPOTHYROIDISM, UNSPECIFIED TYPE: ICD-10-CM

## 2023-10-26 DIAGNOSIS — E78.5 HYPERLIPIDEMIA, UNSPECIFIED HYPERLIPIDEMIA TYPE: ICD-10-CM

## 2023-10-26 LAB
ALBUMIN SERPL BCP-MCNC: 4.4 G/DL (ref 3.5–5)
ALP SERPL-CCNC: 63 U/L (ref 34–104)
ALT SERPL W P-5'-P-CCNC: 28 U/L (ref 7–52)
ANION GAP SERPL CALCULATED.3IONS-SCNC: 11 MMOL/L
AST SERPL W P-5'-P-CCNC: 26 U/L (ref 13–39)
BILIRUB SERPL-MCNC: 0.5 MG/DL (ref 0.2–1)
BUN SERPL-MCNC: 16 MG/DL (ref 5–25)
CALCIUM SERPL-MCNC: 9.7 MG/DL (ref 8.4–10.2)
CHLORIDE SERPL-SCNC: 103 MMOL/L (ref 96–108)
CHOLEST SERPL-MCNC: 208 MG/DL
CO2 SERPL-SCNC: 25 MMOL/L (ref 21–32)
CREAT SERPL-MCNC: 0.87 MG/DL (ref 0.6–1.3)
ERYTHROCYTE [DISTWIDTH] IN BLOOD BY AUTOMATED COUNT: 13 % (ref 11.6–15.1)
GFR SERPL CREATININE-BSD FRML MDRD: 68 ML/MIN/1.73SQ M
GLUCOSE P FAST SERPL-MCNC: 111 MG/DL (ref 65–99)
HCT VFR BLD AUTO: 46.1 % (ref 34.8–46.1)
HDLC SERPL-MCNC: 46 MG/DL
HGB BLD-MCNC: 15.4 G/DL (ref 11.5–15.4)
LDLC SERPL CALC-MCNC: 123 MG/DL (ref 0–100)
MCH RBC QN AUTO: 31 PG (ref 26.8–34.3)
MCHC RBC AUTO-ENTMCNC: 33.4 G/DL (ref 31.4–37.4)
MCV RBC AUTO: 93 FL (ref 82–98)
NONHDLC SERPL-MCNC: 162 MG/DL
PLATELET # BLD AUTO: 253 THOUSANDS/UL (ref 149–390)
PMV BLD AUTO: 9.4 FL (ref 8.9–12.7)
POTASSIUM SERPL-SCNC: 4.1 MMOL/L (ref 3.5–5.3)
PROT SERPL-MCNC: 8.3 G/DL (ref 6.4–8.4)
RBC # BLD AUTO: 4.97 MILLION/UL (ref 3.81–5.12)
SODIUM SERPL-SCNC: 139 MMOL/L (ref 135–147)
TRIGL SERPL-MCNC: 195 MG/DL
TSH SERPL DL<=0.05 MIU/L-ACNC: 3.01 UIU/ML (ref 0.45–4.5)
WBC # BLD AUTO: 5.19 THOUSAND/UL (ref 4.31–10.16)

## 2023-10-26 PROCEDURE — 36415 COLL VENOUS BLD VENIPUNCTURE: CPT

## 2023-10-26 PROCEDURE — 80061 LIPID PANEL: CPT

## 2023-10-26 PROCEDURE — 80053 COMPREHEN METABOLIC PANEL: CPT

## 2023-10-26 PROCEDURE — 85027 COMPLETE CBC AUTOMATED: CPT

## 2023-10-26 PROCEDURE — 84443 ASSAY THYROID STIM HORMONE: CPT

## 2023-10-30 ENCOUNTER — TELEPHONE (OUTPATIENT)
Dept: FAMILY MEDICINE CLINIC | Facility: CLINIC | Age: 68
End: 2023-10-30

## 2023-10-30 NOTE — TELEPHONE ENCOUNTER
----- Message from Britt Mitchell MD sent at 11/86/8894  6:22 AM EDT -----  Recent blood work stable for patient with exception of her cholesterol which has increased from 178 to currently at 208. Please inquire if patient is still on simvastatin 20 mg daily?

## 2023-10-30 NOTE — TELEPHONE ENCOUNTER
Called and spoke with patient. Patient is aware of results. Patient is taking her medication daily. Patient said to let you know, she is still eating candy and that's why her cholesterol is elevated.

## 2023-10-30 NOTE — TELEPHONE ENCOUNTER
I would recommend patient decrease her intake of candy and repeat blood within 6 months otherwise I would recommend that she increase her simvastatin to take one 1&1/2 tablets per day

## 2023-11-02 ENCOUNTER — TELEPHONE (OUTPATIENT)
Dept: FAMILY MEDICINE CLINIC | Facility: CLINIC | Age: 68
End: 2023-11-02

## 2023-11-02 DIAGNOSIS — R29.898 WEAKNESS OF BOTH LOWER EXTREMITIES: Primary | ICD-10-CM

## 2023-11-02 DIAGNOSIS — R29.898 WEAKNESS OF BOTH UPPER EXTREMITIES: ICD-10-CM

## 2023-11-02 NOTE — TELEPHONE ENCOUNTER
Jackquline Mcardle called for a referral to be placed for physical therapy on [facility], [facility]. in [facility] for overall strengthening. Please advise.

## 2023-11-02 NOTE — TELEPHONE ENCOUNTER
Physical therapy order placed in [facility].   Patient may call to schedule appointment

## 2023-11-03 DIAGNOSIS — E78.5 HYPERLIPIDEMIA, UNSPECIFIED HYPERLIPIDEMIA TYPE: ICD-10-CM

## 2023-11-03 RX ORDER — SIMVASTATIN 20 MG
20 TABLET ORAL DAILY
Qty: 90 TABLET | Refills: 3 | Status: SHIPPED | OUTPATIENT
Start: 2023-11-03

## 2023-11-28 ENCOUNTER — EVALUATION (OUTPATIENT)
Dept: PHYSICAL THERAPY | Facility: REHABILITATION | Age: 68
End: 2023-11-28
Payer: MEDICARE

## 2023-11-28 DIAGNOSIS — R29.898 WEAKNESS OF BOTH UPPER EXTREMITIES: ICD-10-CM

## 2023-11-28 DIAGNOSIS — R29.898 WEAKNESS OF BOTH LOWER EXTREMITIES: ICD-10-CM

## 2023-11-28 PROCEDURE — 97162 PT EVAL MOD COMPLEX 30 MIN: CPT | Performed by: PHYSICAL THERAPIST

## 2023-11-28 PROCEDURE — 97110 THERAPEUTIC EXERCISES: CPT | Performed by: PHYSICAL THERAPIST

## 2023-11-28 PROCEDURE — 97140 MANUAL THERAPY 1/> REGIONS: CPT | Performed by: PHYSICAL THERAPIST

## 2023-11-28 NOTE — PROGRESS NOTES
PT Evaluation     Today's date: 2023  Patient name: Sheryl Reeves  : 1955  MRN: [id number]  Referring provider: Francisca Morgan MD  Dx:   Encounter Diagnosis     ICD-10-CM    1. Weakness of both lower extremities  R29.898 Ambulatory Referral to Physical Therapy      2. Weakness of both upper extremities  R29.898 Ambulatory Referral to Physical Therapy          Start Time: 1505  Stop Time: 1600  Total time in clinic (min): 55 minutes    Assessment  Assessment details: 71 y/o left hand dominant female with c/o discomfort in her upper back. She rates the discomfort at a 6/10. Recently she tweaked her right forearm when she was lifting something and she experiences soreness in her forearm. 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    Impairments: abnormal muscle firing, abnormal muscle tone, abnormal or restricted ROM, activity intolerance, impaired physical strength, lacks appropriate home exercise program, pain with function and poor posture     Symptom irritability: moderateUnderstanding of Dx/Px/POC: good   Prognosis: good    Goals  ST - I with HEP  2 - scap MMT = 5/5 t/o  LT - FOTO > expected score  2 - assist her  with a sit to stand transfer  3 - change her 's clothes  4 - perform housework    Plan  Patient would benefit from: skilled physical therapy  Planned therapy interventions: activity modification, abdominal trunk stabilization, joint mobilization, manual therapy, nerve gliding, neuromuscular re-education, strengthening, patient education, therapeutic activities, stretching and home exercise program  Duration in visits: 8  Treatment plan discussed with: patient        Subjective Evaluation    Quality of life: good    Patient Goals  Patient goals for therapy: increased motion, decreased pain, increased strength, independence with ADLs/IADLs and return to sport/leisure activities    Pain  At worst pain ratin  Quality: tight    Social Support  Lives in: multiple-level home  Lives with: spouse    Hand dominance: left      Diagnostic Tests  No diagnostic tests performed  Treatments  Previous treatment: massage        Objective     Concurrent Complaints  Negative for history of trauma    Palpation   Left   Hypertonic in the upper trapezius. Tenderness of the upper trapezius. Right   Hypertonic in the upper trapezius. Tenderness of the upper trapezius.      Neurological Testing     Sensation   Cervical/Thoracic   Left   Intact: light touch    Right   Intact: light touch    Active Range of Motion   Cervical/Thoracic Spine       Thoracic    Left rotation:  Restriction level: moderate  Right rotation:  Restriction level: minimal    Strength/Myotome Testing     Left Shoulder     Planes of Motion   Flexion: 5   Abduction: 5   External rotation at 0°: 5   Internal rotation at 0°: 5     Right Shoulder     Planes of Motion   Flexion: 4+   Abduction: 5   External rotation at 0°: 4+   Internal rotation at 0°: 5     Additional Strength Details  Prone:  Ext: b/l = 5  Horiz abd: left = 4+, right = 4    General Comments:      Shoulder Comments   FOTO = 79                 Precautions: not sig      Manuals 11/28            Prone P-A mid thoracic mobs LMR - gr 2 & 4            Supine tissue deformation - left subscapularis LMR            A-P GH mobs Gr 1 - LMR                         Neuro Re-Ed 11/28                                                                                                       Ther Ex 11/28            Ube - rev 3'            Banded row - 5 sec hold - 3 sec release Blue x7            T hold at wall  5"x5            HEP LMR                                                                Ther Activity                                       Gait Training                                       Modalities

## 2023-12-07 ENCOUNTER — OFFICE VISIT (OUTPATIENT)
Dept: PHYSICAL THERAPY | Facility: REHABILITATION | Age: 68
End: 2023-12-07
Payer: MEDICARE

## 2023-12-07 DIAGNOSIS — R29.898 WEAKNESS OF BOTH UPPER EXTREMITIES: ICD-10-CM

## 2023-12-07 DIAGNOSIS — R29.898 WEAKNESS OF BOTH LOWER EXTREMITIES: Primary | ICD-10-CM

## 2023-12-07 PROCEDURE — 97530 THERAPEUTIC ACTIVITIES: CPT | Performed by: PHYSICAL THERAPIST

## 2023-12-07 PROCEDURE — 97112 NEUROMUSCULAR REEDUCATION: CPT | Performed by: PHYSICAL THERAPIST

## 2023-12-07 PROCEDURE — 97110 THERAPEUTIC EXERCISES: CPT | Performed by: PHYSICAL THERAPIST

## 2023-12-08 ENCOUNTER — TELEPHONE (OUTPATIENT)
Dept: FAMILY MEDICINE CLINIC | Facility: CLINIC | Age: 68
End: 2023-12-08

## 2023-12-08 DIAGNOSIS — Z12.11 COLON CANCER SCREENING: Primary | ICD-10-CM

## 2023-12-11 ENCOUNTER — OFFICE VISIT (OUTPATIENT)
Dept: SLEEP CENTER | Facility: CLINIC | Age: 68
End: 2023-12-11
Payer: MEDICARE

## 2023-12-11 VITALS
SYSTOLIC BLOOD PRESSURE: 130 MMHG | WEIGHT: 185 LBS | HEIGHT: 62 IN | BODY MASS INDEX: 34.04 KG/M2 | DIASTOLIC BLOOD PRESSURE: 74 MMHG

## 2023-12-11 DIAGNOSIS — J45.20 MILD INTERMITTENT ASTHMA WITHOUT COMPLICATION: ICD-10-CM

## 2023-12-11 DIAGNOSIS — J31.0 CHRONIC RHINITIS: ICD-10-CM

## 2023-12-11 DIAGNOSIS — F41.9 ANXIETY: ICD-10-CM

## 2023-12-11 DIAGNOSIS — G47.33 OSA (OBSTRUCTIVE SLEEP APNEA): Primary | ICD-10-CM

## 2023-12-11 DIAGNOSIS — K21.9 GASTROESOPHAGEAL REFLUX DISEASE WITHOUT ESOPHAGITIS: ICD-10-CM

## 2023-12-11 DIAGNOSIS — E66.9 OBESITY (BMI 30-39.9): ICD-10-CM

## 2023-12-11 DIAGNOSIS — F43.9 STRESS AT HOME: ICD-10-CM

## 2023-12-11 PROCEDURE — 99214 OFFICE O/P EST MOD 30 MIN: CPT | Performed by: INTERNAL MEDICINE

## 2023-12-11 NOTE — PATIENT INSTRUCTIONS

## 2023-12-11 NOTE — PROGRESS NOTES
Follow[facility]Up Note - [facility]   Serg Bright  70 y.o. female  QQW:[birth year]39[birth year]  RGT:[id number]  DOS:12/11/2023    CC: I saw this patient for follow-up in clinic today for Sleep disordered breathing, Coexisting Sleep and Medical Problems. Interval changes: Patient received ot a Dream Station Version 2 machine from [facility] over a year ago. She is inquiring about inspire procedure. A home sleep study on 6/15/16 demonstrated a respiratory event index of 73 per hour and a snore index of 46.9%. Minimum oxygen saturation was 55% and 74.4% of the study was spent with saturations less than 90%. During a subsequent therapeutic study, her sleep disordered breathing was successfully remediated with nasal CPAP at 11 cm of water. PFSH, Problem List, Medications & Allergies were reviewed in EMR. She  has a past medical history of Allergic rhinitis, Anaphylaxis, Asthma, GERD (gastroesophageal reflux disease), Glaucoma, and Hypothyroidism. She has a current medication list which includes the following prescription(s): biotin, co-enzyme q-10, fluticasone, levothyroxine, loratadine, multiple vitamin, eql omega 3 fish oil, pantoprazole, simvastatin, budesonide-formoterol, and epinephrine. PHYSIOLOGICAL DATA REVIEW : Using PAP > 4 hours/night 100%. Estimated PARAG 1.2/hour with pressure of 9.3cm [email or web address] percentile; patient has not been using non FDA approved devices to sanitize the machine. INTERPRETATION: Compliance is excellent; Pressure setting is:optimal; ;   SUBJECTIVE: With respect to use of PAP, Cynthia  is experiencing some adverse effects:mask leaks . She derives benefit. Is satisfied with sleep and daytime function. Sleep Routine: Umm Sidhu reports getting 7.5 hrs sleep; she has no difficulty initiating or maintaining sleep . She arises spontaneously and rarely feels refreshed . Umm Sidhu reports constant fatigue, but no daytime sleepiness,.   She rated herself at Total score: 3 /24 on the [place] Sleepiness Scale. 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 She also has feelings of anxiety. Habits: Reports that she has never smoked. She has never used smokeless tobacco.,  Reports current alcohol use.,  Reports no history of drug use., Caffeine use:none; Exercise routine: none except for physical therapy. ROS: Significant for weight has been stable. She has some nasal symptoms due to allergies but asthma and acid reflux controlled. [facility] EXAM: /74   Ht 5' 2" (1.575 m)   Wt 83.9 kg (185 lb)   BMI 33.84 kg/m²     Wt Readings from Last 3 Encounters:   12/11/23 83.9 kg (185 lb)   10/26/23 84.8 kg (187 lb)   09/27/23 84.9 kg (187 lb 2 oz)      Patient is well groomed; well appearing. CNS: Alert, orientated, speech clear & coherent  Psych: cooperative and in no distress. Mental state: Appears normal.  H&N: EOMI; NC/AT: No facial pressure marks, no rashes. Skin/Extrem: col & hydration normal; no edema  Resp: Respiratory effort is normal  Physical findings otherwise essentially unchanged from previous. IMPRESSION: Problem List Items & Comorbidities Addressed this Visit    1. EDEL (obstructive sleep apnea)  PAP DME Resupply/Reorder      2. Stress at home        3. Anxiety        4. Chronic rhinitis        5. Mild intermittent asthma without complication        6. Gastroesophageal reflux disease without esophagitis        7. Obesity (BMI 30-39. 9)            PLAN:  I reviewed results of prior studies and physiologic data with the patient. I discussed treatment options with risks and benefits. She is is not a suitable candidate for inspire procedure because of her RDI that is 73/h with significant oxygen desaturations. Furthermore sleep disordered breathing is successfully treated and she is experiencing no difficulty tolerating CPAP. Treatment with  PAP is medically necessary and [facility] [facility] is agreable to continue use.    Care of equipment, methods to improve comfort using PAP and importance of compliance with therapy were discussed. Pressure setting: continue 7-14 cmH2O. Rx provided to replace supplies and Care coordinated with DME provider. Discussed strategies for stress and weight reduction. Follow-up is advised in 1 year or sooner if needed to monitor progress, compliance and to adjust therapy. Thank you for allowing me to participate in the care of this patient. Sincerely,     Authenticated electronically on 26/66/26   Board Certified Specialist     Portions of the record may have been created with voice recognition software. Occasional wrong word or "sound a like" substitutions may have occurred due to the inherent limitations of voice recognition software. There may also be notations and random deletions of words or characters from malfunctioning software. Read the chart carefully and recognize, using context, where substitutions/deletions have occurred.

## 2023-12-13 ENCOUNTER — TELEPHONE (OUTPATIENT)
Dept: SLEEP CENTER | Facility: CLINIC | Age: 68
End: 2023-12-13

## 2023-12-14 ENCOUNTER — OFFICE VISIT (OUTPATIENT)
Dept: PHYSICAL THERAPY | Facility: REHABILITATION | Age: 68
End: 2023-12-14
Payer: MEDICARE

## 2023-12-14 DIAGNOSIS — R29.898 WEAKNESS OF BOTH LOWER EXTREMITIES: Primary | ICD-10-CM

## 2023-12-14 DIAGNOSIS — R29.898 WEAKNESS OF BOTH UPPER EXTREMITIES: ICD-10-CM

## 2023-12-14 PROCEDURE — 97112 NEUROMUSCULAR REEDUCATION: CPT

## 2023-12-14 PROCEDURE — 97530 THERAPEUTIC ACTIVITIES: CPT

## 2023-12-14 NOTE — PROGRESS NOTES
Daily Note     Today's date: 2023  Patient name: Diego Martino  : 1955  MRN: [id number]  Referring provider: Milton Valerio MD  Dx:   Encounter Diagnosis     ICD-10-CM    1. Weakness of both lower extremities  R29.898       2. Weakness of both upper extremities  R29.898           Start Time: 1525  Stop Time: 1555  Total time in clinic (min): 30 minutes  Pt state she needs to leave early today and shorten today's treatment. Pt was accommodated. Subjective: Pt reports some increased pain in L shldr for about 1.5 days following LV. Had difficulty sleeping d/t this pain and did take OTC pain meds to help manage symptoms. Returned to baseline by Saturday. Objective: See treatment diary below      Assessment: Tolerated treatment well. Continued wit program as outlined below. Slight pain/discomfort initially with L lawnmower pull, but improved with additional reps. Most challenged with bent over surge row, but was able to increase reps compared to LV. Patient would benefit from continued PT to further improve scapular stability, improve strength, decrease pain, and maximize overall function. Plan: Continue per plan of care.       Precautions: not sig      Manuals           Prone P-A mid thoracic mobs LMR - gr 2 & 4            Supine tissue deformation - left subscapularis LMR            A-P GH mobs Gr 1 - LMR                         Neuro Re-Ed                        Cruz - lawnmower  5# - 3x9 5# - 3x9          Holliday - sustained row + walkout  10# x 12 laps 10# x 10 laps          XS - sustained horiz abd + walkout  3# - 2x7 3# - 2x7                                                 Ther Ex           Ube - rev 3'  3'          Banded row - 5 sec hold - 3 sec release Blue x7            T hold at wall  5"x5 5"x12           HEP LMR LMR           Face pull  Orange 3x9 Orange 3x9                                                 Ther Activity  12/07 12/14          Suitcase carry - kb + band  Yellow + 15# - 30 ft x 2 laps each Yellow + 15# - 40 ft x 2 laps each          Bent over row with surge  15# x 15 15# x 20          Gait Training                                       Modalities

## 2023-12-21 ENCOUNTER — OFFICE VISIT (OUTPATIENT)
Dept: PHYSICAL THERAPY | Facility: REHABILITATION | Age: 68
End: 2023-12-21
Payer: MEDICARE

## 2023-12-21 DIAGNOSIS — R29.898 WEAKNESS OF BOTH LOWER EXTREMITIES: Primary | ICD-10-CM

## 2023-12-21 DIAGNOSIS — R29.898 WEAKNESS OF BOTH UPPER EXTREMITIES: ICD-10-CM

## 2023-12-21 PROCEDURE — 97112 NEUROMUSCULAR REEDUCATION: CPT | Performed by: PHYSICAL THERAPIST

## 2023-12-21 PROCEDURE — 97110 THERAPEUTIC EXERCISES: CPT | Performed by: PHYSICAL THERAPIST

## 2023-12-21 PROCEDURE — 97530 THERAPEUTIC ACTIVITIES: CPT | Performed by: PHYSICAL THERAPIST

## 2023-12-21 NOTE — PROGRESS NOTES
"Daily Note     Today's date: 2023  Patient name: Cynthia Reyes  : 1955  MRN: [id number]  Referring provider: Onesimo Kan MD  Dx:   Encounter Diagnosis     ICD-10-CM    1. Weakness of both lower extremities  R29.898       2. Weakness of both upper extremities  R29.898           Start Time: 1445  Stop Time: 1530  Total time in clinic (min): 45 minutes    Subjective:  Pt states she experiences carpal tunnel symptoms periodically t/o the day and often needs to take breaks to shake her hands.     Objective: See treatment diary below  HEP updated with face pulls and median nerve glides.     Assessment: Tolerated treatment well. Patient demonstrated fatigue post treatment, exhibited good technique with therapeutic exercises, and would benefit from continued PT    Plan: Continue per plan of care.      Precautions: not sig      Manuals          Prone P-A mid thoracic mobs LMR - gr 2 & 4            Supine tissue deformation - left subscapularis LMR            A-P GH mobs Gr 1 - LMR                         Neuro Re-Ed                       Cruz - lawnmower  5# - 3x9 5# - 3x9          Cruz - sustained row + walkout  10# x 12 laps 10# x 10 laps          XS - sustained horiz abd + walkout  3# - 2x7 3# - 2x7          XS - alt row    10# - 3x30         XS - shoulder ext - on foam    3# - 3x7         Face pull    Orange - 3x17         Ther Ex          Ube - rev 3'  3' 4'         Banded row - 5 sec hold - 3 sec release Blue x7            T hold at wall  5\"x5 5\"x12           HEP LMR LMR  LMR         Face pull  Orange 3x9 [place] 3x9          Median nerve glides    15         Thoracic ext over counter    15                      Ther Activity           90/90 bottoms up kb carry    3# - 80 ft x2         Suitcase carry - kb + band  Yellow + 15# - 30 ft x 2 laps each Yellow + 15# - 40 ft x 2 laps each 10# - 80 ft x2         Bent " over row with surge  15# x 15 15# x 20          Gait Training                                       Modalities

## 2023-12-26 LAB — COLOGUARD RESULT REPORTABLE: NEGATIVE

## 2023-12-28 ENCOUNTER — OFFICE VISIT (OUTPATIENT)
Dept: PHYSICAL THERAPY | Facility: REHABILITATION | Age: 68
End: 2023-12-28
Payer: MEDICARE

## 2023-12-28 DIAGNOSIS — R29.898 WEAKNESS OF BOTH UPPER EXTREMITIES: ICD-10-CM

## 2023-12-28 DIAGNOSIS — R29.898 WEAKNESS OF BOTH LOWER EXTREMITIES: Primary | ICD-10-CM

## 2023-12-28 PROCEDURE — 97140 MANUAL THERAPY 1/> REGIONS: CPT | Performed by: PHYSICAL THERAPIST

## 2023-12-28 PROCEDURE — 97110 THERAPEUTIC EXERCISES: CPT | Performed by: PHYSICAL THERAPIST

## 2023-12-28 NOTE — PROGRESS NOTES
"Daily Note     Today's date: 2023  Patient name: Cynthia Reyes  : 1955  MRN: [id number]  Referring provider: Onesimo Kan MD  Dx:   Encounter Diagnosis     ICD-10-CM    1. Weakness of both lower extremities  R29.898       2. Weakness of both upper extremities  R29.898           Start Time: 1500  Stop Time: 1545  Total time in clinic (min): 45 minutes    Subjective: Pt c/o a sig increase in tension in her neck (right > left).  This is directly attributed to the stress she has been dealing with the past 24 hours.     Objective: See treatment diary below  Modified tx today due to pt presentation.   [facility] f/b manual PT f/b TE.     Assessment: Tolerated treatment well. Patient would benefit from continued PT    Plan: Continue per plan of care. F/u in 3 weeks.      Precautions: not sig      Manuals         Prone P-A mid thoracic mobs LMR - gr 2 & 4            Tissue deformation - b/l UT     LMR        MFD - right UT     LMR        Supine tissue deformation - left subscapularis LMR            A-P GH mobs Gr 1 - LMR                         Neuro Re-Ed                      Cruz - lawnmower  5# - 3x9 5# - 3x9          Cruz - sustained row + walkout  10# x 12 laps 10# x 10 laps          XS - sustained horiz abd + walkout  3# - 2x7 3# - 2x7          XS - alt row    10# - 3x30         XS - shoulder ext - on foam    3# - 3x7         Face pull    Orange - 3x17         Ther Ex         Ube - rev 3'  3' 4' 15'        Banded row - 5 sec hold - 3 sec release Blue x7            T hold at wall  5\"x5 5\"x12           HEP LMR LMR  LMR         Face pull  Orange 3x9 [place] 3x9          Median nerve glides    15         Thoracic ext over counter    15                      Ther Activity           90/90 bottoms up kb carry    3# - 80 ft x2         Suitcase carry - kb + band  Yellow + 15# - 30 ft x 2 laps each Yellow + 15# " - 40 ft x 2 laps each 10# - 80 ft x2         Bent over row with surge  15# x 15 15# x 20          Gait Training                                       Modalities     12/28        [facility]     10'

## 2024-01-18 ENCOUNTER — OFFICE VISIT (OUTPATIENT)
Dept: PHYSICAL THERAPY | Facility: REHABILITATION | Age: 69
End: 2024-01-18
Payer: MEDICARE

## 2024-01-18 DIAGNOSIS — R29.898 WEAKNESS OF BOTH UPPER EXTREMITIES: ICD-10-CM

## 2024-01-18 DIAGNOSIS — R29.898 WEAKNESS OF BOTH LOWER EXTREMITIES: Primary | ICD-10-CM

## 2024-01-18 PROCEDURE — 97110 THERAPEUTIC EXERCISES: CPT | Performed by: PHYSICAL THERAPIST

## 2024-01-18 PROCEDURE — 97140 MANUAL THERAPY 1/> REGIONS: CPT | Performed by: PHYSICAL THERAPIST

## 2024-01-18 NOTE — PROGRESS NOTES
"Daily Note     Today's date: 2024  Patient name: Cynthia Reyes  : 1955  MRN: [id number]  Referring provider: Onesimo Kan MD  Dx:   Encounter Diagnosis     ICD-10-CM    1. Weakness of both lower extremities  R29.898       2. Weakness of both upper extremities  R29.898                      Subjective: Pt feels her posture is improving.     Objective: See treatment diary below    Assessment: Tolerated treatment well. Patient would benefit from continued PT    Plan: Continue per plan of care.      Precautions: not sig      Manuals        Prone P-A mid thoracic mobs LMR - gr 2 & 4            MFD - lev scap insertion       B/l - LMR       Tissue deformation - b/l UT     LMR        MFD - right UT     LMR B/l - LMR       Supine tissue deformation - left subscapularis LMR            A-P GH mobs Gr 1 - LMR                         Neuro Re-Ed                     Cruz - lawnmower  5# - 3x9 5# - 3x9          Cruz - sustained row + walkout  10# x 12 laps 10# x 10 laps          XS - sustained horiz abd + walkout  3# - 2x7 3# - 2x7          XS - alt row    10# - 3x30         XS - shoulder ext - on foam    3# - 3x7         Face pull    Orange - 3x17         Ther Ex        Ube - rev 3'  3' 4' 15' 5'/5'       Banded row - 5 sec hold - 3 sec release Blue x7            T hold at wall  5\"x5 5\"x12           HEP LMR LMR  LMR         Face pull  Orange 3x9 [place] 3x9          Median nerve glides    15         Thoracic ext over counter    15                      Ther Activity           90/90 bottoms up kb carry    3# - 80 ft x2         Suitcase carry - kb + band  Yellow + 15# - 30 ft x 2 laps each Yellow + 15# - 40 ft x 2 laps each 10# - 80 ft x2         Bent over row with surge  15# x 15 15# x 20          Gait Training                                       Modalities             Cx [facility]     10'  "

## 2024-02-08 ENCOUNTER — OFFICE VISIT (OUTPATIENT)
Dept: PHYSICAL THERAPY | Facility: REHABILITATION | Age: 69
End: 2024-02-08
Payer: MEDICARE

## 2024-02-08 DIAGNOSIS — R29.898 WEAKNESS OF BOTH LOWER EXTREMITIES: Primary | ICD-10-CM

## 2024-02-08 DIAGNOSIS — R29.898 WEAKNESS OF BOTH UPPER EXTREMITIES: ICD-10-CM

## 2024-02-08 PROCEDURE — 97140 MANUAL THERAPY 1/> REGIONS: CPT | Performed by: PHYSICAL THERAPIST

## 2024-02-08 PROCEDURE — 97110 THERAPEUTIC EXERCISES: CPT | Performed by: PHYSICAL THERAPIST

## 2024-02-08 NOTE — PROGRESS NOTES
"Daily Note     Today's date: 2024  Patient name: Cynthia Reyes  : 1955  MRN: [id number]  Referring provider: Onesimo Kan MD  Dx:   Encounter Diagnosis     ICD-10-CM    1. Weakness of both lower extremities  R29.898       2. Weakness of both upper extremities  R29.898           Start Time: 1445  Stop Time: 1545  Total time in clinic (min): 60 minutes    Subjective: Pt feels she has made progress.  She is confident in her abilities to continue with the fitness program.     Objective: See treatment diary below    Assessment: Tolerated treatment well. Patient exhibited good technique with therapeutic exercises    Plan:  d/c PT and transition to the supervised fitness program.      Precautions: not sig      Manuals       Prone P-A mid thoracic mobs LMR - gr 2 & 4            MFD - lev scap insertion       B/l - LMR       Tissue deformation - b/l UT     LMR  LMR      MFD - right UT     LMR B/l - LMR       Supine tissue deformation - left subscapularis LMR            GH oscillations       LMR      A-P GH mobs Gr 1 - LMR            Pec opener       LMR      Neuro Re-Ed                    Sheridan - lawnmower  5# - 3x9 5# - 3x9          Cruz - sustained row + walkout  10# x 12 laps 10# x 10 laps          XS - sustained horiz abd + walkout  3# - 2x7 3# - 2x7          XS - alt row    10# - 3x30         XS - shoulder ext - on foam    3# - 3x7         Face pull    Orange - 3x17         Ther Ex       Ube - rev 3'  3' 4' 15' 5'/5' 5'/5'      Shoulder PROM - seated       LMR      Banded row - 5 sec hold - 3 sec release Blue x7            T hold at wall  5\"x5 5\"x12     10\"x5      HEP LMR LMR  LMR         Face pull  Orange 3x9 Orange 3x9    Orange - 2x12      Median nerve glides    15         Thoracic ext over counter    15         XS - sustained row + walkouts       3# - 2x5      Ther Activity  " 12/07 12/14 12/21         90/90 bottoms up kb carry    3# - 80 ft x2         Suitcase carry - kb + band  Yellow + 15# - 30 ft x 2 laps each Yellow + 15# - 40 ft x 2 laps each 10# - 80 ft x2         Bent over row with surge  15# x 15 15# x 20          Gait Training                                       Modalities     12/28        [facility]     10'

## 2024-03-14 DIAGNOSIS — E78.5 HYPERLIPIDEMIA, UNSPECIFIED HYPERLIPIDEMIA TYPE: ICD-10-CM

## 2024-03-14 DIAGNOSIS — E03.9 HYPOTHYROIDISM, UNSPECIFIED TYPE: ICD-10-CM

## 2024-03-14 RX ORDER — SIMVASTATIN 20 MG
20 TABLET ORAL DAILY
Qty: 90 TABLET | Refills: 3 | Status: SHIPPED | OUTPATIENT
Start: 2024-03-14 | End: 2024-03-18 | Stop reason: SDUPTHER

## 2024-03-14 RX ORDER — LEVOTHYROXINE SODIUM 0.03 MG/1
TABLET ORAL
Qty: 116 TABLET | Refills: 3 | Status: SHIPPED | OUTPATIENT
Start: 2024-03-14 | End: 2024-03-18 | Stop reason: SDUPTHER

## 2024-03-14 NOTE — TELEPHONE ENCOUNTER
Patient called for medication refill for  -Simvastatin  30 mg tab  -Levothyroxine sodium 25 mcg tab  Patient request 90 days supply. Please review, Thanks

## 2024-03-18 DIAGNOSIS — E78.5 HYPERLIPIDEMIA, UNSPECIFIED HYPERLIPIDEMIA TYPE: ICD-10-CM

## 2024-03-18 DIAGNOSIS — E03.9 HYPOTHYROIDISM, UNSPECIFIED TYPE: ICD-10-CM

## 2024-03-18 RX ORDER — SIMVASTATIN 20 MG
20 TABLET ORAL DAILY
Qty: 90 TABLET | Refills: 3 | Status: SHIPPED | OUTPATIENT
Start: 2024-03-18

## 2024-03-18 RX ORDER — LEVOTHYROXINE SODIUM 0.03 MG/1
TABLET ORAL
Qty: 116 TABLET | Refills: 3 | Status: SHIPPED | OUTPATIENT
Start: 2024-03-18

## 2024-03-26 ENCOUNTER — RA CDI HCC (OUTPATIENT)
Dept: OTHER | Facility: HOSPITAL | Age: 69
End: 2024-03-26

## 2024-04-01 ENCOUNTER — OFFICE VISIT (OUTPATIENT)
Dept: FAMILY MEDICINE CLINIC | Facility: CLINIC | Age: 69
End: 2024-04-01
Payer: MEDICARE

## 2024-04-01 VITALS
RESPIRATION RATE: 18 BRPM | OXYGEN SATURATION: 97 % | SYSTOLIC BLOOD PRESSURE: 130 MMHG | DIASTOLIC BLOOD PRESSURE: 80 MMHG | HEIGHT: 62 IN | WEIGHT: 189.25 LBS | HEART RATE: 71 BPM | BODY MASS INDEX: 34.82 KG/M2 | TEMPERATURE: 97.6 F

## 2024-04-01 DIAGNOSIS — E78.5 HYPERLIPIDEMIA, UNSPECIFIED HYPERLIPIDEMIA TYPE: ICD-10-CM

## 2024-04-01 DIAGNOSIS — B35.1 ONYCHOMYCOSIS: Primary | ICD-10-CM

## 2024-04-01 DIAGNOSIS — E03.9 HYPOTHYROIDISM, UNSPECIFIED TYPE: ICD-10-CM

## 2024-04-01 PROCEDURE — 99214 OFFICE O/P EST MOD 30 MIN: CPT | Performed by: FAMILY MEDICINE

## 2024-04-01 PROCEDURE — G2211 COMPLEX E/M VISIT ADD ON: HCPCS | Performed by: FAMILY MEDICINE

## 2024-04-01 RX ORDER — TERBINAFINE HYDROCHLORIDE 250 MG/1
TABLET ORAL
Qty: 30 TABLET | Refills: 2 | Status: SHIPPED | OUTPATIENT
Start: 2024-04-01 | End: 2024-07-01

## 2024-04-01 NOTE — ASSESSMENT & PLAN NOTE
Onychomycosis.  Patient will obtain blood work as ordered.  She was given prescription for Lamisil 250 mg take 1 p.o. daily for 3 months if blood work is within normal limits

## 2024-04-01 NOTE — PROGRESS NOTES
FAMILY PRACTICE OFFICE VISIT       NAME: Cynthia Reyes  AGE: 69 y.o. SEX: female       : 1955        MRN: [id number]    DATE: 2024  TIME: 5:16 PM    Assessment and Plan     Problem List Items Addressed This Visit       Hyperlipidemia     Hyperlipidemia.  Patient will check lipid panel blood work.  She will continue on current dose of statin therapy and omega-3 fatty acid         Relevant Orders    CBC    Comprehensive metabolic panel    Lipid panel    Hemoglobin A1C    Hypothyroidism     Hypothyroidism.  Patient will check TSH blood work and continue with current dose of levothyroxine         Onychomycosis - Primary     Onychomycosis.  Patient will obtain blood work as ordered.  She was given prescription for Lamisil 250 mg take 1 p.o. daily for 3 months if blood work is within normal limits         Relevant Medications    terbinafine (LamISIL) 250 mg tablet    Other Relevant Orders    CBC    Comprehensive metabolic panel    Lipid panel    Hemoglobin A1C           Chief Complaint     Chief Complaint   Patient presents with    Follow-up       History of Present Illness     Patient in the office to discuss various issues.  She states she recently saw her podiatrist who diagnosed her with toenail fungus however he did not prescribe her any medication.  He discussed an oral medication to take to treat her symptoms.  In the past she has used Penlac which was slightly effective but took a long period of time for it to work.    Patient wished to discuss the issue that she had with her .  Dr. Camargo had performed a home visit and administered an RSV vaccine to the patient who is primarily homebound and does not ambulate out of the home.  Insurance did not wish to cover the services.  She was requesting that Dr. Carmen was provide a letter explaining her 's need to have a home visit due to his inability to ambulate.    Patient was also frustrated with her ophthalmologist who performed a  visual field test and felt that she was having signs of normal pressure glaucoma.  Patient was reluctant to start medications.  She felt she was not feeling her best that day and more than likely did not perform adequately during the testing.  Patient would like to consider retesting on a different day however there is the issue of whether insurance company would provide coverage for another test.        Review of Systems   Review of Systems   Constitutional: Negative.    HENT: Negative.     Respiratory: Negative.     Cardiovascular: Negative.    Gastrointestinal: Negative.    Musculoskeletal: Negative.    Skin:         As per HPI   Psychiatric/Behavioral: Negative.         Active Problem List     Patient Active Problem List   Diagnosis    Hyperlipidemia    Esophagitis, reflux    Hypothyroidism    EDEL (obstructive sleep apnea)    Vitamin D deficiency    Insufficient sleep syndrome    Chronic rhinitis    Mild intermittent asthma without complication    Gastroesophageal reflux disease without esophagitis    Obesity (BMI 30-39.9)    Palpitations    Hyperglycemia    Type 2 diabetes mellitus with complication, without long-term current use of insulin (HCC)    Paresthesia    Pruritus    Obesity, morbid (HCC)    Chronic bilateral thoracic back pain    Hot flashes    COVID-19 virus infection    Onychomycosis       Past Medical History:  Past Medical History:   Diagnosis Date    Allergic rhinitis     Anaphylaxis     Asthma     GERD (gastroesophageal reflux disease)     Glaucoma     Hypothyroidism        Past Surgical History:  Past Surgical History:   Procedure Laterality Date    EYE SURGERY Left 06/08/2020     macular gap - 06/08/2020 & 11/02/2020       Family History:  Family History   Problem Relation Age of Onset    Heart disease Mother     Other Maternal Grandmother         Thyroid disorder    No Known Problems Maternal Grandfather     No Known Problems Paternal Grandmother     No Known Problems Paternal Grandfather      No Known Problems Father     No Known Problems Son     No Known Problems Son     No Known Problems Son     No Known Problems Brother     No Known Problems Brother     No Known Problems Maternal Aunt     Breast cancer Paternal Aunt         50's    Breast cancer Cousin         age unknown, 40's    Breast cancer Cousin         early 50's       Social History:  Social History     Socioeconomic History    Marital status:      Spouse name: Not on file    Number of children: Not on file    Years of education: Not on file    Highest education level: Not on file   Occupational History    Not on file   Tobacco Use    Smoking status: Never    Smokeless tobacco: Never   Vaping Use    Vaping status: Never Used   Substance and Sexual Activity    Alcohol use: Yes     Comment: social    Drug use: No    Sexual activity: Not Currently   Other Topics Concern    Not on file   Social History Narrative    Not on file     Social Determinants of Health     Financial Resource Strain: Low Risk  (9/27/2023)    Overall Financial Resource Strain (CARDIA)     Difficulty of Paying Living Expenses: Not very hard   Food Insecurity: Not on file   Transportation Needs: No Transportation Needs (9/27/2023)    PRAPARE - Transportation     Lack of Transportation (Medical): No     Lack of Transportation (Non-Medical): No   Physical Activity: Not on file   Stress: Not on file   Social Connections: Not on file   Intimate Partner Violence: Not on file   Housing Stability: Not on file       Objective     Vitals:    04/01/24 1320   BP: 130/80   Pulse: 71   Resp: 18   Temp: 97.6 °F (36.4 °C)   SpO2: 97%     Wt Readings from Last 3 Encounters:   04/01/24 85.8 kg (189 lb 4 oz)   12/11/23 83.9 kg (185 lb)   10/26/23 84.8 kg (187 lb)       Physical Exam  Constitutional:       General: She is not in acute distress.     Appearance: Normal appearance. She is not ill-appearing.   Cardiovascular:      Rate and Rhythm: Normal rate and regular rhythm.      Heart  sounds: Normal heart sounds. No murmur heard.  Pulmonary:      Effort: Pulmonary effort is normal. No respiratory distress.      Breath sounds: Normal breath sounds. No wheezing, rhonchi or rales.   Musculoskeletal:      Right lower leg: No edema.      Left lower leg: No edema.   Neurological:      Mental Status: She is alert. Mental status is at baseline.   Psychiatric:         Mood and Affect: Mood normal.         Behavior: Behavior normal.         Thought Content: Thought content normal.         Judgment: Judgment normal.         Pertinent Laboratory/Diagnostic Studies:  Lab Results   Component Value Date    GLUCOSE 106 02/27/2015    BUN 16 10/26/2023    CREATININE 0.87 10/26/2023    CALCIUM 9.7 10/26/2023     11/02/2017    K 4.1 10/26/2023    CO2 25 10/26/2023     10/26/2023     Lab Results   Component Value Date    ALT 28 10/26/2023    AST 26 10/26/2023    ALKPHOS 63 10/26/2023    BILITOT 0.6 11/02/2017       Lab Results   Component Value Date    WBC 5.19 10/26/2023    HGB 15.4 10/26/2023    HCT 46.1 10/26/2023    MCV 93 10/26/2023     10/26/2023       Lab Results   Component Value Date    TSH 2.04 06/26/2019       Lab Results   Component Value Date    CHOL 189 11/02/2017     Lab Results   Component Value Date    TRIG 195 (H) 10/26/2023     Lab Results   Component Value Date    HDL 46 (L) 10/26/2023     Lab Results   Component Value Date    LDLCALC 123 (H) 10/26/2023     Lab Results   Component Value Date    HGBA1C 6.0 (H) 06/18/2022       Results for orders placed or performed in visit on 12/13/23   PAP Accessories/Supplies Package   Result Value Ref Range    Supplier Name [employer]     Supplier Phone Number [phone]     Order Status Confirmation In Progress     Delivery Note      Delivery Request Date 12/13/2023     Item Description PAP Accessory     Item Description PAP Mask, Fit to Comfort, Nasal, 1 per 3 months     Item Description PAP Headgear, 1 per 6 months      Item Description PAP Chinstrap, 1 per 6 months     Item Description PAP Humidifier, Heated     Item Description Disposable PAP Filter, 2 per 1 month     Item Description Non-Disposable PAP Filter, 1 per 6 months     Item Description Humidifier Water Chamber, 1 per 6 months     Item Description PAP Mask Interface Cushion, Nasal, 2 per 1 month     Item Description PAP Machine Tubing, Heated, 1 per 3 months        Orders Placed This Encounter   Procedures    CBC    Comprehensive metabolic panel    Lipid panel    Hemoglobin A1C       ALLERGIES:  Allergies   Allergen Reactions    Bee Venom Anaphylaxis     Hornets, wasp, and bees    Codeine Nausea Only and Vomiting     Reaction Date: 24Aug2011;     Other      Narcotic ---nausea and vomiting     Oxycodone-Acetaminophen Nausea Only and Vomiting     Reaction Date: 24Aug2011;     Vicodin  [Hydrocodone-Acetaminophen] Nausea Only and Vomiting     Reaction Date: 24Aug2011;     Sulfa Antibiotics Rash       Current Medications     Current Outpatient Medications   Medication Sig Dispense Refill    Biotin 1000 MCG tablet Take 1,000 mcg by mouth daily      co-enzyme Q-10 30 MG capsule Take 30 mg by mouth 3 (three) times a day      fluticasone (FLONASE) 50 mcg/act nasal spray 2 sprays into each nostril daily      levothyroxine 25 mcg tablet TAKE 1 TABLET MONDAY -     FRIDAY AND 2 TABLETS       SATURDAY - SUNDAY 116 tablet 3    loratadine (CLARITIN) 10 mg tablet Take 1 tablet by mouth daily      Multiple Vitamin (MULTI-VITAMIN DAILY PO) Take 1 tablet by mouth daily      Omega-3 Fatty Acids (EQL OMEGA 3 FISH OIL) 1000 MG CAPS Take 1 capsule by mouth daily      pantoprazole (PROTONIX) 40 mg tablet TAKE 1 TABLET DAILY 90 tablet 1    simvastatin (ZOCOR) 20 mg tablet Take 1 tablet (20 mg total) by mouth daily 90 tablet 3    terbinafine (LamISIL) 250 mg tablet One po q day 30 tablet 2    budesonide-formoterol (SYMBICORT) 160-4.5 mcg/act inhaler Inhale daily  (Patient not taking: Reported on  10/26/2023)      EPINEPHrine (EPIPEN) 0.3 mg/0.3 mL SOAJ Inject 0.3 mL as directed (Patient not taking: Reported on 4/4/2023)       No current facility-administered medications for this visit.         Health Maintenance     Health Maintenance   Topic Date Due    Hepatitis C Screening  Never done    Kidney Health Evaluation: Albumin/Creatinine Ratio  Never done    DM Eye Exam  Never done    Zoster Vaccine (2 of 3) 05/05/2015    Diabetic Foot Exam  05/16/2019    HEMOGLOBIN A1C  09/18/2022    COVID-19 Vaccine (8 - 2023-24 season) 12/07/2023    Colorectal Cancer Screening  12/20/2023    PT PLAN OF CARE  12/28/2023    Urinary Incontinence Screening  09/27/2024    Medicare Annual Wellness Visit (AWV)  09/27/2024    Kidney Health Evaluation: GFR  10/26/2024    Fall Risk  11/28/2024    Depression Screening  04/01/2025    Breast Cancer Screening: Mammogram  07/06/2025    Osteoporosis Screening  Completed    Pneumococcal Vaccine: 65+ Years  Completed    Influenza Vaccine  Completed    HIB Vaccine  Aged Out    IPV Vaccine  Aged Out    Hepatitis A Vaccine  Aged Out    Meningococcal ACWY Vaccine  Aged Out    HPV Vaccine  Aged Out     Immunization History   Administered Date(s) Administered    COVID-19 MODERNA VACC 0.5 ML IM 02/23/2021, 03/23/2021, 10/30/2021, 04/22/2022    COVID-19 Moderna mRNA Vaccine 12 Yr+ 50 mcg/0.5 mL (Spikevax) 10/12/2023    COVID-19 Pfizer Vac BIVALENT Gage-sucrose 12 Yr+ IM 11/02/2022    COVID-19, unspecified 03/20/2021    H1N1, All Formulations 10/30/2009    INFLUENZA 10/01/2015, 10/04/2016, 10/13/2021, 09/20/2022    Influenza, high dose seasonal 0.7 mL 09/04/2020, 09/20/2022, 09/27/2023    Influenza, seasonal, injectable 10/01/2008, 10/01/2016, 10/02/2017    Influenza, seasonal, injectable, preservative free 10/01/2018    Pneumococcal Conjugate 13-Valent 09/04/2020    Pneumococcal Polysaccharide PPV23 11/20/2019    Td (adult), adsorbed 06/01/1999    Zoster 04/30/2014, 03/10/2015       Onesimo Kan,  MD  I spent 30 minutes with this patient of which greater than 50% was spent counseling or reviewing chart

## 2024-04-01 NOTE — ASSESSMENT & PLAN NOTE
Hypothyroidism.  Patient will check TSH blood work and continue with current dose of levothyroxine

## 2024-04-01 NOTE — ASSESSMENT & PLAN NOTE
Hyperlipidemia.  Patient will check lipid panel blood work.  She will continue on current dose of statin therapy and omega-3 fatty acid

## 2024-04-02 ENCOUNTER — APPOINTMENT (OUTPATIENT)
Dept: LAB | Facility: CLINIC | Age: 69
End: 2024-04-02
Payer: MEDICARE

## 2024-04-02 DIAGNOSIS — E78.5 HYPERLIPIDEMIA, UNSPECIFIED HYPERLIPIDEMIA TYPE: ICD-10-CM

## 2024-04-02 DIAGNOSIS — B35.1 ONYCHOMYCOSIS: ICD-10-CM

## 2024-04-02 LAB
ALBUMIN SERPL BCP-MCNC: 4.2 G/DL (ref 3.5–5)
ALP SERPL-CCNC: 57 U/L (ref 34–104)
ALT SERPL W P-5'-P-CCNC: 23 U/L (ref 7–52)
ANION GAP SERPL CALCULATED.3IONS-SCNC: 8 MMOL/L (ref 4–13)
AST SERPL W P-5'-P-CCNC: 27 U/L (ref 13–39)
BILIRUB SERPL-MCNC: 0.58 MG/DL (ref 0.2–1)
BUN SERPL-MCNC: 11 MG/DL (ref 5–25)
CALCIUM SERPL-MCNC: 9.2 MG/DL (ref 8.4–10.2)
CHLORIDE SERPL-SCNC: 104 MMOL/L (ref 96–108)
CHOLEST SERPL-MCNC: 176 MG/DL
CO2 SERPL-SCNC: 27 MMOL/L (ref 21–32)
CREAT SERPL-MCNC: 0.68 MG/DL (ref 0.6–1.3)
ERYTHROCYTE [DISTWIDTH] IN BLOOD BY AUTOMATED COUNT: 13 % (ref 11.6–15.1)
EST. AVERAGE GLUCOSE BLD GHB EST-MCNC: 131 MG/DL
GFR SERPL CREATININE-BSD FRML MDRD: 88 ML/MIN/1.73SQ M
GLUCOSE P FAST SERPL-MCNC: 94 MG/DL (ref 65–99)
HBA1C MFR BLD: 6.2 %
HCT VFR BLD AUTO: 44.7 % (ref 34.8–46.1)
HDLC SERPL-MCNC: 49 MG/DL
HGB BLD-MCNC: 14.6 G/DL (ref 11.5–15.4)
LDLC SERPL CALC-MCNC: 83 MG/DL (ref 0–100)
MCH RBC QN AUTO: 30.7 PG (ref 26.8–34.3)
MCHC RBC AUTO-ENTMCNC: 32.7 G/DL (ref 31.4–37.4)
MCV RBC AUTO: 94 FL (ref 82–98)
NONHDLC SERPL-MCNC: 127 MG/DL
PLATELET # BLD AUTO: 265 THOUSANDS/UL (ref 149–390)
PMV BLD AUTO: 10 FL (ref 8.9–12.7)
POTASSIUM SERPL-SCNC: 4.2 MMOL/L (ref 3.5–5.3)
PROT SERPL-MCNC: 7.7 G/DL (ref 6.4–8.4)
RBC # BLD AUTO: 4.76 MILLION/UL (ref 3.81–5.12)
SODIUM SERPL-SCNC: 139 MMOL/L (ref 135–147)
TRIGL SERPL-MCNC: 218 MG/DL
WBC # BLD AUTO: 4.57 THOUSAND/UL (ref 4.31–10.16)

## 2024-04-02 PROCEDURE — 36415 COLL VENOUS BLD VENIPUNCTURE: CPT

## 2024-04-02 PROCEDURE — 85027 COMPLETE CBC AUTOMATED: CPT

## 2024-04-02 PROCEDURE — 80061 LIPID PANEL: CPT

## 2024-04-02 PROCEDURE — 80053 COMPREHEN METABOLIC PANEL: CPT

## 2024-04-02 PROCEDURE — 83036 HEMOGLOBIN GLYCOSYLATED A1C: CPT

## 2024-04-03 ENCOUNTER — DOCUMENTATION (OUTPATIENT)
Dept: FAMILY MEDICINE CLINIC | Facility: CLINIC | Age: 69
End: 2024-04-03

## 2024-04-03 ENCOUNTER — TELEPHONE (OUTPATIENT)
Dept: FAMILY MEDICINE CLINIC | Facility: CLINIC | Age: 69
End: 2024-04-03

## 2024-04-03 DIAGNOSIS — E78.5 HYPERLIPIDEMIA, UNSPECIFIED HYPERLIPIDEMIA TYPE: ICD-10-CM

## 2024-04-03 NOTE — TELEPHONE ENCOUNTER
----- Message from Onesimo Kan MD sent at 4/3/2024  6:00 AM EDT -----  All recent blodd tests stable for pt.

## 2024-06-03 DIAGNOSIS — K21.9 GASTROESOPHAGEAL REFLUX DISEASE WITHOUT ESOPHAGITIS: ICD-10-CM

## 2024-06-03 RX ORDER — PANTOPRAZOLE SODIUM 40 MG/1
40 TABLET, DELAYED RELEASE ORAL DAILY
Qty: 90 TABLET | Refills: 1 | Status: SHIPPED | OUTPATIENT
Start: 2024-06-03

## 2024-06-03 NOTE — TELEPHONE ENCOUNTER
Reason for call:   [x] Refill   [] Prior Auth  [] Other:     Office:   [x] PCP/Provider -   [] Specialty/Provider -     Medication: Pantoprazole     Dose/Frequency: 40 mg tablet taken by mouth once daily     Quantity: 90    Pharmacy: [facility] - [address] [phone]     Does the patient have enough for 3 days?   [x] Yes   [] No - Send as HP to POD

## 2024-06-08 ENCOUNTER — HOSPITAL ENCOUNTER (OUTPATIENT)
Facility: HOSPITAL | Age: 69
Setting detail: OBSERVATION
Discharge: HOME | End: 2024-06-09
Attending: EMERGENCY MEDICINE | Admitting: HOSPITALIST
Payer: MEDICARE

## 2024-06-08 ENCOUNTER — APPOINTMENT (EMERGENCY)
Dept: RADIOLOGY | Facility: HOSPITAL | Age: 69
End: 2024-06-08
Payer: MEDICARE

## 2024-06-08 ENCOUNTER — APPOINTMENT (EMERGENCY)
Dept: CT IMAGING | Facility: HOSPITAL | Age: 69
End: 2024-06-08
Payer: MEDICARE

## 2024-06-08 DIAGNOSIS — I49.3 PVC'S (PREMATURE VENTRICULAR CONTRACTIONS): ICD-10-CM

## 2024-06-08 DIAGNOSIS — R91.1 LUNG NODULE: ICD-10-CM

## 2024-06-08 DIAGNOSIS — I49.8 BIGEMINY: ICD-10-CM

## 2024-06-08 DIAGNOSIS — E78.5 HYPERLIPIDEMIA, UNSPECIFIED HYPERLIPIDEMIA TYPE: ICD-10-CM

## 2024-06-08 DIAGNOSIS — R00.2 PALPITATIONS: Primary | ICD-10-CM

## 2024-06-08 PROBLEM — R93.89 ABNORMAL CT SCAN: Status: ACTIVE | Noted: 2024-06-08

## 2024-06-08 PROBLEM — J45.909 ASTHMA: Status: ACTIVE | Noted: 2024-06-08

## 2024-06-08 LAB
2HR DELTA HS TROPONIN: 0 NG/L
ALBUMIN SERPL BCP-MCNC: 4.4 G/DL (ref 3.5–5)
ALP SERPL-CCNC: 82 U/L (ref 34–104)
ALT SERPL W P-5'-P-CCNC: 23 U/L (ref 7–52)
ANION GAP SERPL CALCULATED.3IONS-SCNC: 9 MMOL/L (ref 4–13)
AST SERPL W P-5'-P-CCNC: 22 U/L (ref 13–39)
ATRIAL RATE: 74 BPM
ATRIAL RATE: 83 BPM
BASOPHILS # BLD AUTO: 0.06 THOUSANDS/ÂΜL (ref 0–0.1)
BASOPHILS NFR BLD AUTO: 1 % (ref 0–1)
BILIRUB SERPL-MCNC: 0.27 MG/DL (ref 0.2–1)
BILIRUB UR QL STRIP: NEGATIVE
BNP SERPL-MCNC: 29 PG/ML (ref 0–100)
BUN SERPL-MCNC: 16 MG/DL (ref 5–25)
CALCIUM SERPL-MCNC: 9.3 MG/DL (ref 8.4–10.2)
CARDIAC TROPONIN I PNL SERPL HS: 4 NG/L
CARDIAC TROPONIN I PNL SERPL HS: 4 NG/L
CHLORIDE SERPL-SCNC: 106 MMOL/L (ref 96–108)
CK SERPL-CCNC: 92 U/L (ref 26–192)
CLARITY UR: CLEAR
CO2 SERPL-SCNC: 25 MMOL/L (ref 21–32)
COLOR UR: COLORLESS
CREAT SERPL-MCNC: 0.74 MG/DL (ref 0.6–1.3)
D DIMER PPP FEU-MCNC: 0.62 UG/ML FEU
EOSINOPHIL # BLD AUTO: 0.22 THOUSAND/ÂΜL (ref 0–0.61)
EOSINOPHIL NFR BLD AUTO: 3 % (ref 0–6)
ERYTHROCYTE [DISTWIDTH] IN BLOOD BY AUTOMATED COUNT: 13.2 % (ref 11.6–15.1)
GFR SERPL CREATININE-BSD FRML MDRD: 82 ML/MIN/1.73SQ M
GLUCOSE SERPL-MCNC: 102 MG/DL (ref 65–140)
GLUCOSE UR STRIP-MCNC: NEGATIVE MG/DL
HCT VFR BLD AUTO: 46.8 % (ref 34.8–46.1)
HGB BLD-MCNC: 15.1 G/DL (ref 11.5–15.4)
HGB UR QL STRIP.AUTO: NEGATIVE
IMM GRANULOCYTES # BLD AUTO: 0.02 THOUSAND/UL (ref 0–0.2)
IMM GRANULOCYTES NFR BLD AUTO: 0 % (ref 0–2)
KETONES UR STRIP-MCNC: NEGATIVE MG/DL
LEUKOCYTE ESTERASE UR QL STRIP: NEGATIVE
LYMPHOCYTES # BLD AUTO: 1.82 THOUSANDS/ÂΜL (ref 0.6–4.47)
LYMPHOCYTES NFR BLD AUTO: 28 % (ref 14–44)
MAGNESIUM SERPL-MCNC: 2 MG/DL (ref 1.9–2.7)
MCH RBC QN AUTO: 30.3 PG (ref 26.8–34.3)
MCHC RBC AUTO-ENTMCNC: 32.3 G/DL (ref 31.4–37.4)
MCV RBC AUTO: 94 FL (ref 82–98)
MONOCYTES # BLD AUTO: 0.58 THOUSAND/ÂΜL (ref 0.17–1.22)
MONOCYTES NFR BLD AUTO: 9 % (ref 4–12)
NEUTROPHILS # BLD AUTO: 3.71 THOUSANDS/ÂΜL (ref 1.85–7.62)
NEUTS SEG NFR BLD AUTO: 59 % (ref 43–75)
NITRITE UR QL STRIP: NEGATIVE
NRBC BLD AUTO-RTO: 0 /100 WBCS
P AXIS: 49 DEGREES
P AXIS: 55 DEGREES
PH UR STRIP.AUTO: 5.5 [PH]
PHOSPHATE SERPL-MCNC: 4 MG/DL (ref 2.3–4.1)
PLATELET # BLD AUTO: 268 THOUSANDS/UL (ref 149–390)
PMV BLD AUTO: 9.4 FL (ref 8.9–12.7)
POTASSIUM SERPL-SCNC: 4.1 MMOL/L (ref 3.5–5.3)
PR INTERVAL: 154 MS
PR INTERVAL: 166 MS
PROT SERPL-MCNC: 8.1 G/DL (ref 6.4–8.4)
PROT UR STRIP-MCNC: NEGATIVE MG/DL
QRS AXIS: 12 DEGREES
QRS AXIS: 20 DEGREES
QRSD INTERVAL: 78 MS
QRSD INTERVAL: 80 MS
QT INTERVAL: 378 MS
QT INTERVAL: 386 MS
QTC INTERVAL: 419 MS
QTC INTERVAL: 453 MS
RBC # BLD AUTO: 4.99 MILLION/UL (ref 3.81–5.12)
SODIUM SERPL-SCNC: 140 MMOL/L (ref 135–147)
SP GR UR STRIP.AUTO: 1 (ref 1–1.03)
T WAVE AXIS: -20 DEGREES
T WAVE AXIS: -27 DEGREES
T4 FREE SERPL-MCNC: 0.9 NG/DL (ref 0.61–1.12)
TSH SERPL DL<=0.05 MIU/L-ACNC: 5.58 UIU/ML (ref 0.45–4.5)
UROBILINOGEN UR STRIP-ACNC: <2 MG/DL
VENTRICULAR RATE: 74 BPM
VENTRICULAR RATE: 83 BPM
WBC # BLD AUTO: 6.41 THOUSAND/UL (ref 4.31–10.16)

## 2024-06-08 PROCEDURE — 99285 EMERGENCY DEPT VISIT HI MDM: CPT

## 2024-06-08 PROCEDURE — 99222 1ST HOSP IP/OBS MODERATE 55: CPT | Performed by: INTERNAL MEDICINE

## 2024-06-08 PROCEDURE — 99285 EMERGENCY DEPT VISIT HI MDM: CPT | Performed by: EMERGENCY MEDICINE

## 2024-06-08 PROCEDURE — 83735 ASSAY OF MAGNESIUM: CPT

## 2024-06-08 PROCEDURE — 93005 ELECTROCARDIOGRAM TRACING: CPT

## 2024-06-08 PROCEDURE — 96365 THER/PROPH/DIAG IV INF INIT: CPT

## 2024-06-08 PROCEDURE — 84443 ASSAY THYROID STIM HORMONE: CPT

## 2024-06-08 PROCEDURE — 74177 CT ABD & PELVIS W/CONTRAST: CPT

## 2024-06-08 PROCEDURE — 71045 X-RAY EXAM CHEST 1 VIEW: CPT

## 2024-06-08 PROCEDURE — 93010 ELECTROCARDIOGRAM REPORT: CPT | Performed by: INTERNAL MEDICINE

## 2024-06-08 PROCEDURE — 84439 ASSAY OF FREE THYROXINE: CPT

## 2024-06-08 PROCEDURE — 85025 COMPLETE CBC W/AUTO DIFF WBC: CPT

## 2024-06-08 PROCEDURE — 84484 ASSAY OF TROPONIN QUANT: CPT

## 2024-06-08 PROCEDURE — 36415 COLL VENOUS BLD VENIPUNCTURE: CPT

## 2024-06-08 PROCEDURE — 81003 URINALYSIS AUTO W/O SCOPE: CPT

## 2024-06-08 PROCEDURE — 83880 ASSAY OF NATRIURETIC PEPTIDE: CPT

## 2024-06-08 PROCEDURE — 80053 COMPREHEN METABOLIC PANEL: CPT

## 2024-06-08 PROCEDURE — 71260 CT THORAX DX C+: CPT

## 2024-06-08 PROCEDURE — 84100 ASSAY OF PHOSPHORUS: CPT

## 2024-06-08 PROCEDURE — 82550 ASSAY OF CK (CPK): CPT

## 2024-06-08 PROCEDURE — 85379 FIBRIN DEGRADATION QUANT: CPT

## 2024-06-08 PROCEDURE — 96366 THER/PROPH/DIAG IV INF ADDON: CPT

## 2024-06-08 RX ORDER — SODIUM CHLORIDE 9 MG/ML
3 INJECTION INTRAVENOUS
Status: DISCONTINUED | OUTPATIENT
Start: 2024-06-08 | End: 2024-06-09 | Stop reason: HOSPADM

## 2024-06-08 RX ORDER — LEVOTHYROXINE SODIUM 0.03 MG/1
25 TABLET ORAL
Status: DISCONTINUED | OUTPATIENT
Start: 2024-06-10 | End: 2024-06-09 | Stop reason: HOSPADM

## 2024-06-08 RX ORDER — PRAVASTATIN SODIUM 40 MG
40 TABLET ORAL
Status: DISCONTINUED | OUTPATIENT
Start: 2024-06-08 | End: 2024-06-09 | Stop reason: HOSPADM

## 2024-06-08 RX ORDER — SODIUM CHLORIDE, SODIUM GLUCONATE, SODIUM ACETATE, POTASSIUM CHLORIDE, MAGNESIUM CHLORIDE, SODIUM PHOSPHATE, DIBASIC, AND POTASSIUM PHOSPHATE .53; .5; .37; .037; .03; .012; .00082 G/100ML; G/100ML; G/100ML; G/100ML; G/100ML; G/100ML; G/100ML
500 INJECTION, SOLUTION INTRAVENOUS ONCE
Status: COMPLETED | OUTPATIENT
Start: 2024-06-08 | End: 2024-06-08

## 2024-06-08 RX ORDER — LEVOTHYROXINE SODIUM 0.05 MG/1
50 TABLET ORAL
Status: DISCONTINUED | OUTPATIENT
Start: 2024-06-09 | End: 2024-06-09 | Stop reason: HOSPADM

## 2024-06-08 RX ORDER — FLUTICASONE PROPIONATE 50 MCG
2 SPRAY, SUSPENSION (ML) NASAL
Status: DISCONTINUED | OUTPATIENT
Start: 2024-06-08 | End: 2024-06-09 | Stop reason: HOSPADM

## 2024-06-08 RX ORDER — LORATADINE 10 MG/1
10 TABLET ORAL DAILY
Status: DISCONTINUED | OUTPATIENT
Start: 2024-06-08 | End: 2024-06-09 | Stop reason: HOSPADM

## 2024-06-08 RX ORDER — ENOXAPARIN SODIUM 100 MG/ML
40 INJECTION SUBCUTANEOUS DAILY
Status: DISCONTINUED | OUTPATIENT
Start: 2024-06-08 | End: 2024-06-09 | Stop reason: HOSPADM

## 2024-06-08 RX ORDER — BUDESONIDE AND FORMOTEROL FUMARATE DIHYDRATE 160; 4.5 UG/1; UG/1
1 AEROSOL RESPIRATORY (INHALATION) EVERY MORNING
Status: DISCONTINUED | OUTPATIENT
Start: 2024-06-08 | End: 2024-06-09 | Stop reason: HOSPADM

## 2024-06-08 RX ORDER — ACETAMINOPHEN 325 MG/1
650 TABLET ORAL EVERY 6 HOURS PRN
Status: DISCONTINUED | OUTPATIENT
Start: 2024-06-08 | End: 2024-06-09 | Stop reason: HOSPADM

## 2024-06-08 RX ORDER — PANTOPRAZOLE SODIUM 40 MG/1
40 TABLET, DELAYED RELEASE ORAL
Status: DISCONTINUED | OUTPATIENT
Start: 2024-06-09 | End: 2024-06-09 | Stop reason: HOSPADM

## 2024-06-08 RX ORDER — LATANOPROST 50 UG/ML
1 SOLUTION/ DROPS OPHTHALMIC
Status: DISCONTINUED | OUTPATIENT
Start: 2024-06-08 | End: 2024-06-09 | Stop reason: HOSPADM

## 2024-06-08 RX ADMIN — SODIUM CHLORIDE, SODIUM GLUCONATE, SODIUM ACETATE, POTASSIUM CHLORIDE, MAGNESIUM CHLORIDE, SODIUM PHOSPHATE, DIBASIC, AND POTASSIUM PHOSPHATE 500 ML: .53; .5; .37; .037; .03; .012; .00082 INJECTION, SOLUTION INTRAVENOUS at 05:49

## 2024-06-08 RX ADMIN — IOHEXOL 100 ML: 350 INJECTION, SOLUTION INTRAVENOUS at 07:34

## 2024-06-08 RX ADMIN — FLUTICASONE PROPIONATE 2 SPRAY: 50 SPRAY, METERED NASAL at 21:15

## 2024-06-08 RX ADMIN — LATANOPROST 1 DROP: 50 SOLUTION OPHTHALMIC at 21:14

## 2024-06-08 RX ADMIN — PRAVASTATIN SODIUM 40 MG: 40 TABLET ORAL at 15:36

## 2024-06-08 RX ADMIN — BUDESONIDE AND FORMOTEROL FUMARATE DIHYDRATE 1 PUFF: 160; 4.5 AEROSOL RESPIRATORY (INHALATION) at 13:40

## 2024-06-08 RX ADMIN — LORATADINE 10 MG: 10 TABLET ORAL at 13:40

## 2024-06-08 NOTE — ASSESSMENT & PLAN NOTE
Patient has history of asthma  She does not report wheezing or worsening shortness of breath. Not in acute exacerbation.   Continue home Symbicort 160-4.5 1 puff daily

## 2024-06-08 NOTE — ASSESSMENT & PLAN NOTE
"4 mm left upper lobe nodule.   \"Indeterminate solid pulmonary nodule measuring 4 mm. In a low-risk patient with a solid nodule <6 mm, recommend no follow-up. In a high-risk patient, CT at 12 months is optional with stronger consideration if there is suspicious nodule morphology and/or upper lobe location.\"    Patient denies any history of smoking. She is low-risk. Follow up is not required.   "

## 2024-06-08 NOTE — ASSESSMENT & PLAN NOTE
Lab Results   Component Value Date    ENI2OKOCTCBA 5.582 (H) 06/08/2024    FREET4 0.90 06/08/2024      Home regimen: Levothyroxine 25 mcg daily Monday through Friday.  Levothyroxine 50 mcg daily Saturday and Sunday.  Patient took only 25 mcg this morning, as she forgot it was Saturday    Plan:  Continue home regimen

## 2024-06-08 NOTE — ASSESSMENT & PLAN NOTE
"Palpitations     \"Sinus rhythm with frequent Premature ventricular complexes in a pattern of bigeminy  Cannot rule out Anterior infarct , age undetermined  T wave abnormality, consider inferolateral ischemia  Abnormal ECG  When compared with ECG of 27-FEB-2023 10:08,  No significant change was found  Sinus rhythm with frequent Premature ventricular complexes in a pattern of bigeminy  Cannot rule out Anterior infarct , age undetermined  T wave abnormality, consider inferolateral ischemia  Abnormal ECG  When compared with ECG of 27-FEB-2023 10:08,  No significant change was found  Confirmed by Edgardo Kang ([id number]) on 6/8/2024 9:27:14 AM\"   "

## 2024-06-08 NOTE — H&P
"[facility]  H&P  Name: Cynthia Reyes 69 y.o. female I MRN: [id number]  Unit/Bed#: ED-33 I Date of Admission: 6/8/2024   Date of Service: 6/8/2024 I Hospital Day: 0      Assessment & Plan   * Palpitations  Assessment & Plan  Patient presents with palpitations  She has been experiencing palpitations for the past 2 weeks, 3-4 times a week, lasting for about 1 hour.   They come on while lying in bed, usually between 3 and 5 AM  No chest pain, no shortness of breath  During these episodes her SBP is elevated to 170s-180s when she checks with her home cuff  The patient has previously been worked up for palpitations outpatient, without significant findings on Holter monitor  EKG in the ED shows sinus rhythm with frequent premature ventricular complexes    Plan:  Monitor Telemetry  Consult cardiology, appreciate recommendations   Consider calcium channel blocker for palpitation symptoms. 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  Levothyroxine 50 mcg daily Saturday and Sunday.  Patient took only 25 mcg this morning, as she forgot it was Saturday    Plan:  Continue home regimen    EDEL (obstructive sleep apnea)  Assessment & Plan  Patient asked her son to bring in home CPAP machine    Mild intermittent asthma without complication  Assessment & Plan  Patient has history of asthma  She does not report wheezing or worsening shortness of breath. Not in acute exacerbation.   Continue home Symbicort 160-4.5 1 puff daily    Abnormal CT scan  Assessment & Plan  4 mm left upper lobe nodule.   \"Indeterminate solid pulmonary nodule measuring 4 mm. In a low-risk patient with a solid nodule <6 mm, recommend no follow-up. In a " "high-risk patient, CT at 12 months is optional with stronger consideration if there is suspicious nodule morphology and/or upper lobe location.\"    Patient denies any history of smoking. She is low-risk. Follow up is not required.     Gastroesophageal reflux disease without esophagitis  Assessment & Plan  Continue home Protonix 40 mg daily    Chronic rhinitis  Assessment & Plan  Continue home Flonase and Loratadine    Hyperlipidemia  Assessment & Plan  Continue home statin with hospital formulary           VTE Pharmacologic Prophylaxis: VTE Score: 3 Moderate Risk (Score 3-4) - Pharmacological DVT Prophylaxis Ordered: enoxaparin (Lovenox).  Code Status: Level 1 - Full Code   Discussion with family: Updated  (friend) at bedside.    Anticipated Length of Stay: Patient will be admitted on an observation basis with an anticipated length of stay of less than 2 midnights secondary to palpitations.    Chief Complaint: Palpitations     History of Present Illness:  Cynthia Reyes is a 69 y.o. female with a PMH of hypothyroidism, HLD, and EDEL who presents with intermittent palpitations x 2 weeks.  She reports that for the past 2 weeks, she been experiencing episodes of palpitations 3-4 times a week.  The episodes come on while lying down usually between 3 and 5 AM, and last for 1 hour.  Episodes are not associated with any chest pain or shortness of breath.  She checks her blood pressure at home during the episodes, and notes her SBP is elevated to 170s to 180s.  She does calming breathing exercises, with resolution of the palpitations.  She reports experiencing palpitations that were similar in the past, and had cardiac workups including Holter monitor.  She notes that in the past she was under significant stress, and attributed the palpitations to that.  However she notes that now she is no longer under the same stressors, so she is unsure why she is experiencing the palpitations again.  Last night the " palpitations began earlier than usual in the night, around 1:30 in the morning.  She was unable to go back to sleep as the palpitations were persistent, so she decided to come to the ED for evaluation.  In the ED blood pressure was initially elevated to 186/84, which came down to 140s on its own.  Trops were negative.  TSH was elevated, but free T4 was within normal range.  D-dimer was slightly elevated at 0.62, but when age-adjusted VTE is unlikely.  EKG showed sinus rhythm heart rate 74, with frequent PVCs and bigeminy.  The patient will be admitted under observation for monitoring on telemetry and cardiology consult for palpitations.    Review of Systems:  Review of Systems   Constitutional:  Negative for appetite change, chills and fever.   HENT:  Negative for congestion, rhinorrhea and sinus pressure.    Eyes:  Negative for visual disturbance.   Respiratory:  Negative for cough and shortness of breath.    Cardiovascular:  Positive for palpitations. Negative for chest pain and leg swelling.   Gastrointestinal:  Negative for abdominal pain, constipation (last BM yesterday), diarrhea, nausea and vomiting.   Genitourinary:  Negative for difficulty urinating, dysuria and urgency.   Musculoskeletal:  Positive for back pain (upper back).   Skin:  Negative for rash.   Neurological:  Negative for dizziness, light-headedness and headaches.   All other systems reviewed and are negative.      Past Medical and Surgical History:   Past Medical History:   Diagnosis Date    Allergic rhinitis     Anaphylaxis     Asthma     GERD (gastroesophageal reflux disease)     Glaucoma     Hypothyroidism        Past Surgical History:   Procedure Laterality Date    EYE SURGERY Left 06/08/2020     macular gap - 06/08/2020 & 11/02/2020       Meds/Allergies:  Prior to Admission medications    Medication Sig Start Date End Date Taking? Authorizing Provider   Biotin 1000 MCG tablet Take 1,000 mcg by mouth daily    Historical Provider, MD    budesonide-formoterol (SYMBICORT) 160-4.5 mcg/act inhaler Inhale daily    Historical Provider, MD   budesonide-formoterol (Symbicort) 160-4.5 mcg/act inhaler Inhale 1 puff every morning Rinse mouth after use. 4/24/24 8/22/24  Crystal Foster MD   co-enzyme Q-10 30 MG capsule Take 30 mg by mouth 3 (three) times a day    Historical Provider, MD   EPINEPHrine (EPIPEN) 0.3 mg/0.3 mL SOAJ Inject 0.3 mL as directed  Patient not taking: Reported on 4/4/2023 11/23/16   Historical Provider, MD   fluticasone (FLONASE) 50 mcg/act nasal spray 2 sprays into each nostril daily    Historical Provider, MD   latanoprost (XALATAN) 0.005 % ophthalmic solution 1 drop daily at bedtime    Historical Provider, MD   levothyroxine 25 mcg tablet TAKE 1 TABLET MONDAY -     FRIDAY AND 2 TABLETS       SATURDAY - ASPEN 3/18/24   Onesimo Kan MD   loratadine (CLARITIN) 10 mg tablet Take 1 tablet by mouth daily    Historical Provider, MD   Multiple Vitamin (MULTI-VITAMIN DAILY PO) Take 1 tablet by mouth daily 11/23/16   Historical Provider, MD   Omega-3 Fatty Acids (EQL OMEGA 3 FISH OIL) 1000 MG CAPS Take 1 capsule by mouth daily    Historical Provider, MD   pantoprazole (PROTONIX) 40 mg tablet Take 1 tablet (40 mg total) by mouth daily 6/3/24   Onesimo Kan MD   simvastatin (ZOCOR) 20 mg tablet Take 1 tablet (20 mg total) by mouth daily  Patient taking differently: Take 30 mg by mouth daily 3/18/24   Onesimo Kan MD   terbinafine (LamISIL) 250 mg tablet One po q day 4/1/24 7/1/24  Onesimo Kan MD     I have reviewed home medications with patient personally.    Allergies:   Allergies   Allergen Reactions    Bee Venom Anaphylaxis     Hornets, wasp, and bees    Codeine Nausea Only and Vomiting     Reaction Date: 24Aug2011;     Other      Narcotic ---nausea and vomiting     Oxycodone-Acetaminophen Nausea Only and Vomiting     Reaction Date: 24Aug2011;     Vicodin  [Hydrocodone-Acetaminophen] Nausea Only and Vomiting      Reaction Date: 24Aug2011;     Sulfa Antibiotics Rash       Social History:  Marital Status:    Occupation:   Patient Pre-hospital Living Situation: Home  Patient Pre-hospital Level of Mobility: walks  Patient Pre-hospital Diet Restrictions:   Substance Use History: Social alcohol, 1-2 drinks on some weekends. No tobacco, no other substances.   Social History     Substance and Sexual Activity   Alcohol Use Yes    Comment: social     Social History     Tobacco Use   Smoking Status Never   Smokeless Tobacco Never     Social History     Substance and Sexual Activity   Drug Use No       Family History:  Family History   Problem Relation Age of Onset    Heart disease Mother     No Known Problems Father     No Known Problems Brother     No Known Problems Brother     No Known Problems Maternal Aunt     Breast cancer Paternal Aunt         50's    Allergy (severe) Paternal Uncle         bee venom    Other Maternal Grandmother         Thyroid disorder    No Known Problems Maternal Grandfather     No Known Problems Paternal Grandmother     No Known Problems Paternal Grandfather     No Known Problems Son     No Known Problems Son     No Known Problems Son     Breast cancer Cousin         age unknown, 40's    Breast cancer Cousin         early 50's       Physical Exam:     Vitals:   Blood Pressure: 145/65 (06/08/24 0630)  Pulse: 76 (06/08/24 0630)  Temperature: 97.8 °F (36.6 °C) (06/08/24 0544)  Temp Source: Oral (06/08/24 0544)  Respirations: 18 (06/08/24 0630)  SpO2: 95 % (06/08/24 0630)    Physical Exam  Vitals and nursing note reviewed.   Constitutional:       General: She is not in acute distress.     Appearance: She is not ill-appearing.   HENT:      Head: Normocephalic and atraumatic.      Right Ear: External ear normal.      Left Ear: External ear normal.      Nose: Nose normal.      Mouth/Throat:      Mouth: Mucous membranes are moist.      Pharynx: Oropharynx is clear.   Eyes:      General: No scleral  icterus.  Cardiovascular:      Rate and Rhythm: Normal rate and regular rhythm.   Pulmonary:      Effort: Pulmonary effort is normal.      Breath sounds: Normal breath sounds.   Chest:      Chest wall: No tenderness.   Abdominal:      General: Bowel sounds are normal. There is no distension.      Palpations: Abdomen is soft. There is no mass.      Tenderness: There is no abdominal tenderness. There is no guarding or rebound.   Musculoskeletal:        Arms:       Cervical back: Neck supple.      Right lower leg: No edema.      Left lower leg: No edema.      Comments: No injuries or deformities. Mild TTP of right-sided thoracic paraspinal musculature   Skin:     General: Skin is warm and dry.   Neurological:      General: No focal deficit present.      Mental Status: She is alert and oriented to person, place, and time. Mental status is at baseline.   Psychiatric:         Mood and Affect: Mood normal.          Additional Data:     Lab Results:  Results from last 7 days   Lab Units 06/08/24  0550   WBC Thousand/uL 6.41   HEMOGLOBIN g/dL 15.1   HEMATOCRIT % 46.8*   PLATELETS Thousands/uL 268   SEGS PCT % 59   LYMPHO PCT % 28   MONO PCT % 9   EOS PCT % 3     Results from last 7 days   Lab Units 06/08/24  0550   SODIUM mmol/L 140   POTASSIUM mmol/L 4.1   CHLORIDE mmol/L 106   CO2 mmol/L 25   BUN mg/dL 16   CREATININE mg/dL 0.74   ANION GAP mmol/L 9   CALCIUM mg/dL 9.3   ALBUMIN g/dL 4.4   TOTAL BILIRUBIN mg/dL 0.27   ALK PHOS U/L 82   ALT U/L 23   AST U/L 22   GLUCOSE RANDOM mg/dL 102                       Lines/Drains:  Invasive Devices       Peripheral Intravenous Line  Duration             Peripheral IV 06/08/24 Right;Ventral (anterior) Forearm <1 day                        Imaging: Reviewed radiology reports from this admission including: chest CT scan and abdominal/pelvic CT  CT chest abdomen pelvis w contrast   Final Result by Grecia Miguel MD (06/08 0801)         1. No acute posttraumatic injury visualized.    2. 4 mm left upper lobe nodule.      Indeterminate solid pulmonary nodule measuring 4 mm. In a low-risk patient with a solid nodule <6 mm, recommend no follow-up. In a high-risk patient, CT at 12 months is optional with stronger consideration if there is suspicious nodule morphology and/or    upper lobe location.               Richardson BANUELOS et al. Guidelines for Management of Incidental Pulmonary Nodules Detected on CT Images: From the Fleischner Society 2017. Radiology. 2017 Jul;284(1):228-243.   Study was marked in Epic for follow-up notification.      Workstation performed: [id number]         CT recon only thoracic spine   Final Result by Grecia Miguel MD (06/08 0805)      No fracture or traumatic subluxation.   Thoracic spondylosis.               Workstation performed: [id number]         X-ray chest 1 view portable   ED Interpretation by Vick Marsh MD (06/08 0626)   No signs of obvious cardiopulmonary disease          EKG and Other Studies Reviewed on Admission:   EKG: Sinus rhythm with frequent premature ventricular complexes in a pattern of bigeminy. HR 74.     ** Please Note: This note has been constructed using a voice recognition system. **

## 2024-06-08 NOTE — ED NOTES
Pt utilizing call bell, sitting on chair.  Requesting inhailer, c/o still being in ED.   Informed about being held in ED. Apologized for wait. Pt requesting to go home and follow up OP.   CESAR advised.      Shay Chopra RN  06/08/24 [id number]

## 2024-06-08 NOTE — ED PROVIDER NOTES
History  Chief Complaint   Patient presents with    Palpitations     Pt reports her heart started racing when she heard a noise in a house last night and it has not calmed down since.     69-year-old female with a history of asthma and hypothyroidism presenting to the ED with a complaint of palpitations.  Patient states that she has had palpitations on and off for the past week after she had a fall at home.  Patient states that she had fallen off a stepstool and hit the right side of her body into a door jam.  Patient denies head strike and LOC.  Patient did not get medical attention at that time and just had minor pain.  Since then patient has had spurts of palpitations that have gone away.  Tonight patient was asleep when her dog startled her waking her up and patient noted she was having palpitations again.  Patient states that her palpitations did not go away which prompted her to come to the ER.  Patient is denying chest pain, shortness of breath, difficulty breathing, abdominal pain, urinary changes, nausea, vomiting, diarrhea, headache, lightheadedness, dizziness, blurred vision.        Prior to Admission Medications   Prescriptions Last Dose Informant Patient Reported? Taking?   Biotin 1000 MCG tablet  Self Yes No   Sig: Take 1,000 mcg by mouth daily   EPINEPHrine (EPIPEN) 0.3 mg/0.3 mL SOAJ  Self Yes No   Sig: Inject 0.3 mL as directed   Patient not taking: Reported on 4/4/2023   Multiple Vitamin (MULTI-VITAMIN DAILY PO)  Self Yes No   Sig: Take 1 tablet by mouth daily   Omega-3 Fatty Acids (EQL OMEGA 3 FISH OIL) 1000 MG CAPS  Self Yes No   Sig: Take 1 capsule by mouth daily   budesonide-formoterol (SYMBICORT) 160-4.5 mcg/act inhaler  Self Yes No   Sig: Inhale daily   budesonide-formoterol (Symbicort) 160-4.5 mcg/act inhaler   No No   Sig: Inhale 1 puff every morning Rinse mouth after use.   co-enzyme Q-10 30 MG capsule  Self Yes No   Sig: Take 30 mg by mouth 3 (three) times a day   fluticasone (FLONASE) 50  mcg/act nasal spray  Self Yes No   Si sprays into each nostril daily   latanoprost (XALATAN) 0.005 % ophthalmic solution   Yes No   Si drop daily at bedtime   levothyroxine 25 mcg tablet   No No   Sig: TAKE 1 TABLET MONDAY -     FRIDAY AND 2 TABLETS       SATURDAY -    loratadine (CLARITIN) 10 mg tablet  Self Yes No   Sig: Take 1 tablet by mouth daily   pantoprazole (PROTONIX) 40 mg tablet   No No   Sig: Take 1 tablet (40 mg total) by mouth daily   simvastatin (ZOCOR) 20 mg tablet   No No   Sig: Take 1 tablet (20 mg total) by mouth daily   Patient taking differently: Take 30 mg by mouth daily   terbinafine (LamISIL) 250 mg tablet   No No   Sig: One po q day      Facility-Administered Medications: None       Past Medical History:   Diagnosis Date    Allergic rhinitis     Anaphylaxis     Asthma     GERD (gastroesophageal reflux disease)     Glaucoma     Hypothyroidism        Past Surgical History:   Procedure Laterality Date    EYE SURGERY Left 2020     macular gap - 2020 & 2020       Family History   Problem Relation Age of Onset    Heart disease Mother     No Known Problems Father     No Known Problems Brother     No Known Problems Brother     No Known Problems Maternal Aunt     Breast cancer Paternal Aunt         50's    Allergy (severe) Paternal Uncle         bee venom    Other Maternal Grandmother         Thyroid disorder    No Known Problems Maternal Grandfather     No Known Problems Paternal Grandmother     No Known Problems Paternal Grandfather     No Known Problems Son     No Known Problems Son     No Known Problems Son     Breast cancer Cousin         age unknown, 40's    Breast cancer Cousin         early 50's     I have reviewed and agree with the history as documented.    E-Cigarette/Vaping    E-Cigarette Use Never User      E-Cigarette/Vaping Substances    Nicotine No     THC No     CBD No     Flavoring No     Other No     Unknown No      Social History     Tobacco Use     Smoking status: Never    Smokeless tobacco: Never   Vaping Use    Vaping status: Never Used   Substance Use Topics    Alcohol use: Yes     Comment: social    Drug use: No        Review of Systems   Constitutional:  Negative for appetite change, chills and fever.   HENT:  Negative for congestion and rhinorrhea.    Eyes:  Negative for visual disturbance.   Respiratory:  Negative for chest tightness and shortness of breath.    Cardiovascular:  Positive for palpitations. Negative for chest pain.   Gastrointestinal:  Negative for abdominal pain.   Endocrine: Negative for polyuria.   Genitourinary:  Negative for dyspareunia and dysuria.   Musculoskeletal:  Negative for back pain and gait problem.   Neurological:  Negative for dizziness, weakness, numbness and headaches.   Psychiatric/Behavioral:  Negative for agitation.        Physical Exam  ED Triage Vitals   Temperature Pulse Respirations Blood Pressure SpO2   06/08/24 0544 06/08/24 0530 06/08/24 0530 06/08/24 0530 06/08/24 0530   97.8 °F (36.6 °C) 78 20 (!) 186/84 97 %      Temp Source Heart Rate Source Patient Position - Orthostatic VS BP Location FiO2 (%)   06/08/24 0544 06/08/24 0530 06/08/24 0600 06/08/24 0530 --   Oral Monitor Lying Right arm       Pain Score       --                    Orthostatic Vital Signs  Vitals:    06/08/24 0530 06/08/24 0600 06/08/24 0630   BP: (!) 186/84 144/75 145/65   Pulse: 78 75 76   Patient Position - Orthostatic VS:  Lying Lying       Physical Exam  Constitutional:       Appearance: Normal appearance.   HENT:      Head: Normocephalic and atraumatic.      Right Ear: External ear normal.      Left Ear: External ear normal.      Nose: Nose normal.      Mouth/Throat:      Pharynx: Oropharynx is clear.   Eyes:      Extraocular Movements: Extraocular movements intact.   Cardiovascular:      Rate and Rhythm: Normal rate. Rhythm irregular.      Pulses: Normal pulses.      Heart sounds: Normal heart sounds.   Pulmonary:      Effort:  Pulmonary effort is normal.      Breath sounds: Normal breath sounds.   Abdominal:      Palpations: Abdomen is soft.   Musculoskeletal:         General: Normal range of motion.      Right shoulder: Tenderness present.        Arms:       Cervical back: Normal range of motion.      Comments: Tenderness along the right lateral chest and back.  No obvious deformities or bruising.   Skin:     General: Skin is warm.      Capillary Refill: Capillary refill takes less than 2 seconds.   Neurological:      General: No focal deficit present.      Mental Status: She is alert and oriented to person, place, and time.   Psychiatric:         Mood and Affect: Mood normal.         Behavior: Behavior normal.         ED Medications  Medications   sodium chloride (PF) 0.9 % injection 3 mL (has no administration in time range)   multi-electrolyte (PLASMALYTE-A/ISOLYTE-S PH 7.4) IV solution 500 mL (500 mL Intravenous New Bag 6/8/24 0549)   iohexol (OMNIPAQUE) 350 MG/ML injection (MULTI-DOSE) 100 mL (100 mL Intravenous Given 6/8/24 0734)       Diagnostic Studies  Results Reviewed       Procedure Component Value Units Date/Time    HS Troponin I 2hr [[id number]]  (Normal) Collected: 06/08/24 0812    Lab Status: Final result Specimen: Blood from Arm, Right Updated: 06/08/24 0843     hs TnI 2hr 4 ng/L      Delta 2hr hsTnI 0 ng/L     HS Troponin I 4hr [[id number]]     Lab Status: No result Specimen: Blood     B-Type Natriuretic Peptide(BNP) [[id number]]  (Normal) Collected: 06/08/24 0550    Lab Status: Final result Specimen: Blood from Arm, Right Updated: 06/08/24 0653     BNP 29 pg/mL     TSH, 3rd generation with Free T4 reflex [[id number]]  (Abnormal) Collected: 06/08/24 0556    Lab Status: Final result Specimen: Blood Updated: 06/08/24 0638     TSH 3RD GENERATON 5.582 uIU/mL     T4, free [[id number]] Collected: 06/08/24 0556    Lab Status: In process Specimen: Blood Updated: 06/08/24 0638    D-dimer, quantitative [[id number]]  (Abnormal)  Collected: 06/08/24 0550    Lab Status: Final result Specimen: Blood from Arm, Right Updated: 06/08/24 0636     D-Dimer, Quant 0.62 ug/ml FEU     Narrative:      In the evaluation for possible pulmonary embolism, in the appropriate (Well's Score of 4 or less) patient, the age adjusted d-dimer cutoff for this patient can be calculated as:    Age x 0.01 (in ug/mL) for Age-adjusted D-dimer exclusion threshold for a patient over 50 years.    HS Troponin 0hr (reflex protocol) [[id number]]  (Normal) Collected: 06/08/24 0550    Lab Status: Final result Specimen: Blood from Arm, Right Updated: 06/08/24 0628     hs TnI 0hr 4 ng/L     Comprehensive metabolic panel [[id number]] Collected: 06/08/24 0550    Lab Status: Final result Specimen: Blood from Arm, Right Updated: 06/08/24 0624     Sodium 140 mmol/L      Potassium 4.1 mmol/L      Chloride 106 mmol/L      CO2 25 mmol/L      ANION GAP 9 mmol/L      BUN 16 mg/dL      Creatinine 0.74 mg/dL      Glucose 102 mg/dL      Calcium 9.3 mg/dL      AST 22 U/L      ALT 23 U/L      Alkaline Phosphatase 82 U/L      Total Protein 8.1 g/dL      Albumin 4.4 g/dL      Total Bilirubin 0.27 mg/dL      eGFR 82 ml/min/1.73sq m     Narrative:      National Kidney Disease Foundation guidelines for Chronic Kidney Disease (CKD):     Stage 1 with normal or high GFR (GFR > 90 mL/min/1.73 square meters)    Stage 2 Mild CKD (GFR = 60-89 mL/min/1.73 square meters)    Stage 3A Moderate CKD (GFR = 45-59 mL/min/1.73 square meters)    Stage 3B Moderate CKD (GFR = 30-44 mL/min/1.73 square meters)    Stage 4 Severe CKD (GFR = 15-29 mL/min/1.73 square meters)    Stage 5 End Stage CKD (GFR <15 mL/min/1.73 square meters)  Note: GFR calculation is accurate only with a steady state creatinine    Magnesium [[id number]]  (Normal) Collected: 06/08/24 0550    Lab Status: Final result Specimen: Blood from Arm, Right Updated: 06/08/24 0624     Magnesium 2.0 mg/dL     Phosphorus [[id number]]  (Normal) Collected: 06/08/24  0550    Lab Status: Final result Specimen: Blood from Arm, Right Updated: 06/08/24 0624     Phosphorus 4.0 mg/dL     CK [[id number]]  (Normal) Collected: 06/08/24 0550    Lab Status: Final result Specimen: Blood from Arm, Right Updated: 06/08/24 0624     Total CK 92 U/L     UA w Reflex to Microscopic w Reflex to Culture [[id number]] Collected: 06/08/24 0557    Lab Status: Final result Specimen: Urine, Clean Catch Updated: 06/08/24 0608     Color, UA Colorless     Clarity, UA Clear     Specific Gravity, UA 1.003     pH, UA 5.5     Leukocytes, UA Negative     Nitrite, UA Negative     Protein, UA Negative mg/dl      Glucose, UA Negative mg/dl      Ketones, UA Negative mg/dl      Urobilinogen, UA <2.0 mg/dl      Bilirubin, UA Negative     Occult Blood, UA Negative    CBC and differential [[id number]]  (Abnormal) Collected: 06/08/24 0550    Lab Status: Final result Specimen: Blood from Arm, Right Updated: 06/08/24 0605     WBC 6.41 Thousand/uL      RBC 4.99 Million/uL      Hemoglobin 15.1 g/dL      Hematocrit 46.8 %      MCV 94 fL      MCH 30.3 pg      MCHC 32.3 g/dL      RDW 13.2 %      MPV 9.4 fL      Platelets 268 Thousands/uL      nRBC 0 /100 WBCs      Segmented % 59 %      Immature Grans % 0 %      Lymphocytes % 28 %      Monocytes % 9 %      Eosinophils Relative 3 %      Basophils Relative 1 %      Absolute Neutrophils 3.71 Thousands/µL      Absolute Immature Grans 0.02 Thousand/uL      Absolute Lymphocytes 1.82 Thousands/µL      Absolute Monocytes 0.58 Thousand/µL      Eosinophils Absolute 0.22 Thousand/µL      Basophils Absolute 0.06 Thousands/µL                    CT chest abdomen pelvis w contrast   Final Result by Grecia Miguel MD (06/08 0801)         1. No acute posttraumatic injury visualized.   2. 4 mm left upper lobe nodule.      Indeterminate solid pulmonary nodule measuring 4 mm. In a low-risk patient with a solid nodule <6 mm, recommend no follow-up. In a high-risk patient, CT at 12 months is  optional with stronger consideration if there is suspicious nodule morphology and/or    upper lobe location.               Richardson BANUELOS, et al. Guidelines for Management of Incidental Pulmonary Nodules Detected on CT Images: From the Fleischner Society 2017. Radiology. 2017 Jul;284(1):228-243.   Study was marked in Epic for follow-up notification.      Workstation performed: [id number]         CT recon only thoracic spine   Final Result by Grecia Miguel MD (06/08 0805)      No fracture or traumatic subluxation.   Thoracic spondylosis.               Workstation performed: [id number]         X-ray chest 1 view portable   ED Interpretation by Vick Marsh MD (06/08 0626)   No signs of obvious cardiopulmonary disease            Procedures  ECG 12 Lead Documentation Only    Date/Time: 6/8/2024 5:59 AM    Performed by: Vick Marsh MD  Authorized by: Vick Marsh MD    Indications / Diagnosis:  Palpitations  ECG reviewed by me, the ED Provider: yes    Patient location:  ED  Previous ECG:     Previous ECG:  Compared to current    Similarity:  Changes noted  Interpretation:     Interpretation: abnormal    Rate:     ECG rate:  83    ECG rate assessment: normal    Rhythm:     Rhythm: sinus rhythm    Ectopy:     Ectopy: bigeminy and PVCs    QRS:     QRS axis:  Left    QRS intervals:  Normal  Conduction:     Conduction: normal    ST segments:     ST segments:  Normal  T waves:     T waves: normal          ED Course  ED Course as of 06/08/24 0846   Sat Jun 08, 2024   0625 UA w Reflex to Microscopic w Reflex to Culture  reassuring   0625 Total CK: 92  reassuring   0625 Phosphorus: 4.0  reassuring   0625 MAGNESIUM: 2.0  reassuring   0625 Comprehensive metabolic panel  Unremarkable and reassuring   0626 CBC and differential(!)  Mildly elevated HCT otherwise unremarkable and reassuring   0636 hs TnI 0hr: 4  reassuring   0638 D-Dimer, Quant(!): 0.62  Normal for age adjusted   0652 TSH 3RD GENERATON(!):  5.582  elevated   0806 IMPRESSION:        1. No acute posttraumatic injury visualized.  2. 4 mm left upper lobe nodule.     Indeterminate solid pulmonary nodule measuring 4 mm. In a low-risk patient with a solid nodule <6 mm, recommend no follow-up. In a high-risk patient, CT at 12 months is optional with stronger consideration if there is suspicious nodule morphology and/or   upper lobe location.              Richardson BANUELOS, et al. Guidelines for Management of Incidental Pulmonary Nodules Detected on CT Images: From the Fleischner Society 2017. Radiology. 2017 Jul;284(1):228-243.  Study was marked in Epic for follow-up notification.     Workstation performed: [id number]     0806 Per ct read    MEDIASTINUM AND ORION: 10 mm right hilar node unchanged.   0809 IMPRESSION:     No fracture or traumatic subluxation.  Thoracic spondylosis.              Workstation performed: [id number]     0828 Discussed ct scan results with patient             HEART Risk Score      Flowsheet Row Most Recent Value   Heart Score Risk Calculator    History 1 Filed at: 06/08/2024 0828   ECG 1 Filed at: 06/08/2024 0828   Age 2 Filed at: 06/08/2024 0828   Risk Factors 1 Filed at: 06/08/2024 0828   Troponin 0 Filed at: 06/08/2024 0828   HEART Score 5 Filed at: 06/08/2024 0828                PERC Rule for PE      Flowsheet Row Most Recent Value   PERC Rule for PE    Age >=50 1 Filed at: 06/08/2024 0846   HR >=100 0 Filed at: 06/08/2024 0846   O2 Sat on room air < 95% 0 Filed at: 06/08/2024 0846   History of PE or DVT 0 Filed at: 06/08/2024 0846   Recent trauma or surgery 0 Filed at: 06/08/2024 0846   Hemoptysis 0 Filed at: 06/08/2024 0846   Exogenous estrogen 0 Filed at: 06/08/2024 0846   Unilateral leg swelling 0 Filed at: 06/08/2024 0846   PERC Rule for PE Results 1 Filed at: 06/08/2024 0846                    Wells' Criteria for PE      Flowsheet Row Most Recent Value   Wells' Criteria for PE    Clinical signs and symptoms of DVT 0 Filed at:  06/08/2024 0846   PE is primary diagnosis or equally likely 0 Filed at: 06/08/2024 0846   HR >100 0 Filed at: 06/08/2024 0846   Immobilization at least 3 days or Surgery in the previous 4 weeks 0 Filed at: 06/08/2024 0846   Previous, objectively diagnosed PE or DVT 0 Filed at: 06/08/2024 0846   Hemoptysis 0 Filed at: 06/08/2024 0846   Malignancy with treatment within 6 months or palliative 0 Filed at: 06/08/2024 0846   Wells' Criteria Total 0 Filed at: 06/08/2024 0846              Medical Decision Making  69-year-old female presenting to the emergency department with a complaint of palpitations.  DDx including but not limited to: metabolic abnormality, cardiac arrhythmia, ACS, MI,  thyroid disease, PE, anxiety, adverse reaction.    Will conduct a cardiac workup as well as a D-dimer to assess for possible PE.  Will assess thyroid given past thyroid history.  Will complete a CT scan.  Following workup with relatively unremarkable labs and CT scan significant for a lung nodule in which the patient was made aware of, it was ultimately decided that patient would benefit from ops admission on telemetry.   IM accepted patient to observation and patient admitted.    Amount and/or Complexity of Data Reviewed  Labs: ordered. Decision-making details documented in ED Course.  Radiology: ordered and independent interpretation performed.    Risk  Prescription drug management.  Decision regarding hospitalization.          Disposition  Final diagnoses:   Palpitations   Bigeminy   Lung nodule     Time reflects when diagnosis was documented in both MDM as applicable and the Disposition within this note       Time User Action Codes Description Comment    6/8/2024  7:30 AM Vick Modi [R00.2] Palpitations     6/8/2024  7:30 AM Vick Modi [I49.8] Bigeminy     6/8/2024  8:08 AM Vick Modi [R91.1] Lung nodule           ED Disposition       ED Disposition   Admit    Condition   Stable    Date/Time   Sat  Jun 8, 2024 0843    Comment   Case was discussed with [facility] and the patient's admission status was agreed to be Admission Status: observation status to the service of Dr. Durbin .               Follow-up Information    None         Patient's Medications   Discharge Prescriptions    No medications on file     No discharge procedures on file.    PDMP Review         Value Time User    PDMP Reviewed  Yes 6/8/2024  5:22 AM Pérez Martinez MD             ED Provider  Attending physically available and evaluated Cynthia Reyes. I managed the patient along with the ED Attending.    Electronically Signed by           Vick Marsh MD  06/08/24 0844       Vick Marsh MD  06/08/24 0846

## 2024-06-08 NOTE — ASSESSMENT & PLAN NOTE
Patient presents with palpitations  She has been experiencing palpitations for the past 2 weeks, 3-4 times a week, lasting for about 1 hour.   They come on while lying in bed, usually between 3 and 5 AM  No chest pain, no shortness of breath  During these episodes her SBP is elevated to 170s-180s when she checks with her home cuff  The patient has previously been worked up for palpitations outpatient, without significant findings on Holter monitor  EKG in the ED shows sinus rhythm with frequent premature ventricular complexes    Plan:  Monitor Telemetry  Consult cardiology, appreciate recommendations   Consider calcium channel blocker for palpitation symptoms. 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

## 2024-06-08 NOTE — PLAN OF CARE
Problem: PAIN - ADULT  Goal: Verbalizes/displays adequate comfort level or baseline comfort level  Description: Interventions:  - Encourage patient to monitor pain and request assistance  - Assess pain using appropriate pain scale  - Administer analgesics based on type and severity of pain and evaluate response  - Implement non-pharmacological measures as appropriate and evaluate response  - Consider cultural and social influences on pain and pain management  - Notify physician/advanced practitioner if interventions unsuccessful or patient reports new pain  Outcome: Progressing     Problem: INFECTION - ADULT  Goal: Absence or prevention of progression during hospitalization  Description: INTERVENTIONS:  - Assess and monitor for signs and symptoms of infection  - Monitor lab/diagnostic results  - Monitor all insertion sites, i.e. indwelling lines, tubes, and drains  - Monitor endotracheal if appropriate and nasal secretions for changes in amount and color  - Fleming appropriate cooling/warming therapies per order  - Administer medications as ordered  - Instruct and encourage patient and family to use good hand hygiene technique  - Identify and instruct in appropriate isolation precautions for identified infection/condition  Outcome: Progressing  Goal: Absence of fever/infection during neutropenic period  Description: INTERVENTIONS:  - Monitor WBC    Outcome: Progressing     Problem: SAFETY ADULT  Goal: Patient will remain free of falls  Description: INTERVENTIONS:  - Educate patient/family on patient safety including physical limitations  - Instruct patient to call for assistance with activity   - Consult OT/PT to assist with strengthening/mobility   - Keep Call bell within reach  - Keep bed low and locked with side rails adjusted as appropriate  - Keep care items and personal belongings within reach  - Initiate and maintain comfort rounds  - Make Fall Risk Sign visible to staff  - Offer Toileting every 2 Hours,  in advance of need  - Initiate/Maintain 2alarm  - Obtain necessary fall risk management equipment: 2  - Apply yellow socks and bracelet for high fall risk patients  - Consider moving patient to room near nurses station  Outcome: Progressing  Goal: Maintain or return to baseline ADL function  Description: INTERVENTIONS:  -  Assess patient's ability to carry out ADLs; assess patient's baseline for ADL function and identify physical deficits which impact ability to perform ADLs (bathing, care of mouth/teeth, toileting, grooming, dressing, etc.)  - Assess/evaluate cause of self-care deficits   - Assess range of motion  - Assess patient's mobility; develop plan if impaired  - Assess patient's need for assistive devices and provide as appropriate  - Encourage maximum independence but intervene and supervise when necessary  - Involve family in performance of ADLs  - Assess for home care needs following discharge   - Consider OT consult to assist with ADL evaluation and planning for discharge  - Provide patient education as appropriate  Outcome: Progressing  Goal: Maintains/Returns to pre admission functional level  Description: INTERVENTIONS:  - Perform AM-PAC 6 Click Basic Mobility/ Daily Activity assessment daily.  - Set and communicate daily mobility goal to care team and patient/family/caregiver.   - Collaborate with rehabilitation services on mobility goals if consulted  - Perform Range of Motion 2 times a day.  - Reposition patient every 2 hours.  - Dangle patient 2 times a day  - Stand patient 2 times a day  - Ambulate patient 2 times a day  - Out of bed to chair 2 times a day   - Out of bed for meals 2 times a day  - Out of bed for toileting  - Record patient progress and toleration of activity level   Outcome: Progressing     Problem: DISCHARGE PLANNING  Goal: Discharge to home or other facility with appropriate resources  Description: INTERVENTIONS:  - Identify barriers to discharge w/patient and caregiver  -  Arrange for needed discharge resources and transportation as appropriate  - Identify discharge learning needs (meds, wound care, etc.)  - Arrange for interpretive services to assist at discharge as needed  - Refer to Case Management Department for coordinating discharge planning if the patient needs post-hospital services based on physician/advanced practitioner order or complex needs related to functional status, cognitive ability, or social support system  Outcome: Progressing     Problem: Knowledge Deficit  Goal: Patient/family/caregiver demonstrates understanding of disease process, treatment plan, medications, and discharge instructions  Description: Complete learning assessment and assess knowledge base.  Interventions:  - Provide teaching at level of understanding  - Provide teaching via preferred learning methods  Outcome: Progressing     Problem: CARDIOVASCULAR - ADULT  Goal: Maintains optimal cardiac output and hemodynamic stability  Description: INTERVENTIONS:  - Monitor I/O, vital signs and rhythm  - Monitor for S/S and trends of decreased cardiac output  - Administer and titrate ordered vasoactive medications to optimize hemodynamic stability  - Assess quality of pulses, skin color and temperature  - Assess for signs of decreased coronary artery perfusion  - Instruct patient to report change in severity of symptoms  Outcome: Progressing  Goal: Absence of cardiac dysrhythmias or at baseline rhythm  Description: INTERVENTIONS:  - Continuous cardiac monitoring, vital signs, obtain 12 lead EKG if ordered  - Administer antiarrhythmic and heart rate control medications as ordered  - Monitor electrolytes and administer replacement therapy as ordered  Outcome: Progressing

## 2024-06-08 NOTE — ED ATTENDING ATTESTATION
6/8/2024  I, Pérez Martinez MD, saw and evaluated the patient. I have discussed the patient with the resident/non-physician practitioner and agree with the resident's/non-physician practitioner's findings, Plan of Care, and MDM as documented in the resident's/non-physician practitioner's note, except where noted. All available labs and Radiology studies were reviewed.  I was present for key portions of any procedure(s) performed by the resident/non-physician practitioner and I was immediately available to provide assistance.       At this point I agree with the current assessment done in the Emergency Department.  I have conducted an independent evaluation of this patient a history and physical is as follows: Patient is a 69 year old female with 2 hours of palpitations this AM. Has cough from asthma and did not take her inhaler yet this AM. (+) R upper back trauma 2 weeks ago. No long travel. No sob. No fever. No chest pain. No N/V. No urinary sx. No caffeine use. Was last seen at [facility] immunology on 4/24/24 for toxic effect of wasp venom. [facility] website checked on this patient and no Rx found. NCAT. Mask in place. No scleral icterus. Lungs clear. Heart regular without murmur. Abdomen soft and nontender. Good bowel sounds. No edema. No rash noted. (+) R paravertebral lower thoracic tenderness. No rash or ecchymosis noted. DDx including but not limited to: metabolic abnormality, cardiac arrhythmia, ACS, MI,  thyroid disease, PE, anxiety, adverse reaction, intrathoracic trauma, retroperitoneal trauma; doubt dissection or rhabdomyolysis. I reviewed EKG. Will check labs, CXR and CT.     ED Course         Critical Care Time  Procedures

## 2024-06-09 ENCOUNTER — APPOINTMENT (OUTPATIENT)
Dept: NON INVASIVE DIAGNOSTICS | Facility: HOSPITAL | Age: 69
End: 2024-06-09
Payer: MEDICARE

## 2024-06-09 VITALS
RESPIRATION RATE: 18 BRPM | OXYGEN SATURATION: 93 % | WEIGHT: 188 LBS | SYSTOLIC BLOOD PRESSURE: 134 MMHG | DIASTOLIC BLOOD PRESSURE: 76 MMHG | HEIGHT: 61 IN | TEMPERATURE: 98.4 F | BODY MASS INDEX: 35.5 KG/M2 | HEART RATE: 72 BPM

## 2024-06-09 LAB
ANION GAP SERPL CALCULATED.3IONS-SCNC: 6 MMOL/L (ref 4–13)
AORTIC ROOT: 2.9 CM
APICAL FOUR CHAMBER EJECTION FRACTION: 62 %
ASCENDING AORTA: 3.2 CM
BASOPHILS # BLD AUTO: 0.05 THOUSANDS/ÂΜL (ref 0–0.1)
BASOPHILS NFR BLD AUTO: 1 % (ref 0–1)
BSA FOR ECHO PROCEDURE: 1.84 M2
BUN SERPL-MCNC: 15 MG/DL (ref 5–25)
CALCIUM SERPL-MCNC: 9.3 MG/DL (ref 8.4–10.2)
CHLORIDE SERPL-SCNC: 104 MMOL/L (ref 96–108)
CO2 SERPL-SCNC: 28 MMOL/L (ref 21–32)
CREAT SERPL-MCNC: 0.76 MG/DL (ref 0.6–1.3)
E WAVE DECELERATION TIME: 251 MS
E/A RATIO: 0.83
EOSINOPHIL # BLD AUTO: 0.25 THOUSAND/ÂΜL (ref 0–0.61)
EOSINOPHIL NFR BLD AUTO: 5 % (ref 0–6)
ERYTHROCYTE [DISTWIDTH] IN BLOOD BY AUTOMATED COUNT: 13.3 % (ref 11.6–15.1)
FRACTIONAL SHORTENING: 33 (ref 28–44)
GFR SERPL CREATININE-BSD FRML MDRD: 79 ML/MIN/1.73SQ M
GLUCOSE P FAST SERPL-MCNC: 102 MG/DL (ref 65–99)
GLUCOSE SERPL-MCNC: 102 MG/DL (ref 65–140)
HCT VFR BLD AUTO: 45.3 % (ref 34.8–46.1)
HGB BLD-MCNC: 14.6 G/DL (ref 11.5–15.4)
IMM GRANULOCYTES # BLD AUTO: 0.01 THOUSAND/UL (ref 0–0.2)
IMM GRANULOCYTES NFR BLD AUTO: 0 % (ref 0–2)
INTERVENTRICULAR SEPTUM IN DIASTOLE (PARASTERNAL SHORT AXIS VIEW): 1.1 CM
INTERVENTRICULAR SEPTUM: 1.1 CM (ref 0.6–1.1)
LAAS-AP4: 16.1 CM2
LEFT ATRIUM SIZE: 4.4 CM
LEFT INTERNAL DIMENSION IN SYSTOLE: 3.1 CM (ref 2.1–4)
LEFT VENTRICULAR INTERNAL DIMENSION IN DIASTOLE: 4.6 CM (ref 3.5–6)
LEFT VENTRICULAR POSTERIOR WALL IN END DIASTOLE: 0.9 CM
LEFT VENTRICULAR STROKE VOLUME: 58 ML
LVSV (TEICH): 58 ML
LYMPHOCYTES # BLD AUTO: 1.41 THOUSANDS/ÂΜL (ref 0.6–4.47)
LYMPHOCYTES NFR BLD AUTO: 26 % (ref 14–44)
MCH RBC QN AUTO: 30.5 PG (ref 26.8–34.3)
MCHC RBC AUTO-ENTMCNC: 32.2 G/DL (ref 31.4–37.4)
MCV RBC AUTO: 95 FL (ref 82–98)
MONOCYTES # BLD AUTO: 0.48 THOUSAND/ÂΜL (ref 0.17–1.22)
MONOCYTES NFR BLD AUTO: 9 % (ref 4–12)
MV E'TISSUE VEL-SEP: 6 CM/S
MV PEAK A VEL: 0.77 M/S
MV PEAK E VEL: 64 CM/S
MV STENOSIS PRESSURE HALF TIME: 73 MS
MV VALVE AREA P 1/2 METHOD: 3.01
NEUTROPHILS # BLD AUTO: 3.22 THOUSANDS/ÂΜL (ref 1.85–7.62)
NEUTS SEG NFR BLD AUTO: 59 % (ref 43–75)
NRBC BLD AUTO-RTO: 0 /100 WBCS
PLATELET # BLD AUTO: 260 THOUSANDS/UL (ref 149–390)
PMV BLD AUTO: 9.3 FL (ref 8.9–12.7)
POTASSIUM SERPL-SCNC: 4.2 MMOL/L (ref 3.5–5.3)
RBC # BLD AUTO: 4.79 MILLION/UL (ref 3.81–5.12)
RIGHT ATRIUM AREA SYSTOLE A4C: 11.6 CM2
RIGHT VENTRICLE ID DIMENSION: 3.1 CM
SL CV LV EF: 60
SL CV PED ECHO LEFT VENTRICLE DIASTOLIC VOLUME (MOD BIPLANE) 2D: 96 ML
SL CV PED ECHO LEFT VENTRICLE SYSTOLIC VOLUME (MOD BIPLANE) 2D: 37 ML
SODIUM SERPL-SCNC: 138 MMOL/L (ref 135–147)
TRICUSPID ANNULAR PLANE SYSTOLIC EXCURSION: 2.1 CM
WBC # BLD AUTO: 5.42 THOUSAND/UL (ref 4.31–10.16)

## 2024-06-09 PROCEDURE — 93306 TTE W/DOPPLER COMPLETE: CPT

## 2024-06-09 PROCEDURE — 80048 BASIC METABOLIC PNL TOTAL CA: CPT

## 2024-06-09 PROCEDURE — 99232 SBSQ HOSP IP/OBS MODERATE 35: CPT | Performed by: HOSPITALIST

## 2024-06-09 PROCEDURE — 99204 OFFICE O/P NEW MOD 45 MIN: CPT | Performed by: INTERNAL MEDICINE

## 2024-06-09 PROCEDURE — 93306 TTE W/DOPPLER COMPLETE: CPT | Performed by: INTERNAL MEDICINE

## 2024-06-09 PROCEDURE — 85025 COMPLETE CBC W/AUTO DIFF WBC: CPT

## 2024-06-09 PROCEDURE — 99239 HOSP IP/OBS DSCHRG MGMT >30: CPT | Performed by: HOSPITALIST

## 2024-06-09 RX ORDER — SIMVASTATIN 20 MG
30 TABLET ORAL DAILY
Qty: 30 TABLET | Refills: 0 | Status: SHIPPED | OUTPATIENT
Start: 2024-06-09

## 2024-06-09 RX ADMIN — B-COMPLEX W/ C & FOLIC ACID TAB 1 TABLET: TAB at 08:25

## 2024-06-09 RX ADMIN — BUDESONIDE AND FORMOTEROL FUMARATE DIHYDRATE 1 PUFF: 160; 4.5 AEROSOL RESPIRATORY (INHALATION) at 08:27

## 2024-06-09 RX ADMIN — PANTOPRAZOLE SODIUM 40 MG: 40 TABLET, DELAYED RELEASE ORAL at 06:29

## 2024-06-09 RX ADMIN — LEVOTHYROXINE SODIUM 50 MCG: 50 TABLET ORAL at 06:35

## 2024-06-09 RX ADMIN — LORATADINE 10 MG: 10 TABLET ORAL at 08:25

## 2024-06-09 NOTE — ASSESSMENT & PLAN NOTE
Patient presents with palpitations  She has been experiencing palpitations for the past 2 weeks, 3-4 times a week, lasting for about 1 hour.   They come on while lying in bed, usually between 3 and 5 AM  No chest pain, no shortness of breath  During these episodes her SBP is elevated to 170s-180s when she checks with her home cuff  The patient has previously been worked up for palpitations outpatient, without significant findings on Holter monitor  EKG in the ED shows sinus rhythm with frequent premature ventricular complexes    Plan:  Consider calcium channel blocker for palpitation symptoms. 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

## 2024-06-09 NOTE — ASSESSMENT & PLAN NOTE
Lab Results   Component Value Date    [id number] 5.582 (H) 06/08/2024    FREET4 0.90 06/08/2024      Home regimen: Levothyroxine 25 mcg daily Monday through Friday.  Levothyroxine 50 mcg daily Saturday and Sunday.  Patient took only 25 mcg this morning, as she forgot it was Saturday    Plan:  Continue home regimen

## 2024-06-09 NOTE — CONSULTS
Cardiology   Cynthia Reyes 69 y.o. female MRN: [id number]  Unit/Bed#: S -01 Encounter: [id number]      Reason for Consult / Principal Problem: Palpitations    Physician Requesting Consult:  Candy Melara MD    Outpatient Cardiologist: None    Assessment  1.  Palpitations  2.  History of PVCs.  -ECG on admission demonstrated NSR with frequent PVCs.  -Telemetry review -predominant NSR, with occasional-frequent PVCs.  -48-hour Holter monitor 3/2023 - 9.3% intermittent VE's -trigeminy/bigeminy or single.  No sustained episodes.  3. Dyslipidemia  -Lipid profile 4/2/2024; cholesterol 176, triglyceride 218, HDL 49 and LDL calculated 83.  -On pravastatin 40 mg daily.  4. EDEL; utilizes CPAP at HS  5. Hypothyroidism  -TSH level 5.6/free T4 0.90  -On levothyroxine.    Plan  -Pt appears to be experiencing palpitations with in the presence of occasional to frequent PVCs.  Has a history of PVCs.  Historically PVCs felt to be mediated in the presence of high stress states.  When she had worn a Holter monitor back in March 2023, she was found to have a 9.3% VE burden; at this time she was experiencing a great deal of stress secondary to taking care of her disabled .  Over the past 2 weeks she has been experiencing moderate intensity pain/tenderness in her right upper back stemming from a mechanical fall.  Since then she has been experiencing intermittent palpitations.  Otherwise when she is in her usual state of health and not feeling stressed, she does not experience palpitations.   -Obtain TTE to rule out any significant structural heart disease.  -We discussed initiation of certain medical therapies that have been shown to help/potentially reduce the overall burden of PVCs. She has asthma and currently on immunotherapy given a severe/anaphylactic reaction to bee stings.  Beta-blocker therapy would be relatively contraindicated in this regard. Could consider the use of calcium channel blockers, however at  this time patient prefers to not start medical therapies. She states that once she has recovered from her acute back injury she would like to get a sense of how she is feeling from a palpitation perspective.   -Would recommend a 48-hour Holter monitor as an outpatient to assess her overall PVC burden with her usual level of activity.  -Can consider an exercise stress test as an outpatient to rule out ischemia as a potential etiology for her PVCs.  -CPAP at HS.  -TSH/free T4 level assessed -and within normal range.  -Will arrange follow-up with cardiology as an outpatient.      HPI: Cynthia Reyes 69 y.o. year old female with a medical history of intermittent palpitations, EDEL, hypothyroidism, GERD, asthma, and chronic rhinitis.  Non-smoker, denies excessive alcohol or recreational/illicit drug use.  She did not previously follow with a routine cardiology provider prior to admission.    In review of her medical record, she has had outpatient cardiac monitoring performed in the past for further workup/evaluation of palpitations.  The last time she had worn a Holter monitor was back in March 2023 which demonstrated 9.3% burden of of intermittent ventricular ectopy which included a combination of trigeminy/bigeminy or single PVCs.  No sustained episodes.  She presented to the [facility] on 6/8/2024 with complaints of intermittent palpitations over the past 2 weeks.  Further workup in the ED  Hemodynamics on admission  Temp 97.8 degrees F, HR 78, RR 20, /84, sat 97% on RA.  Laboratory data on admission  Na+ 140, K+ 4.1, chloride 106, CO2 25, anion gap 9, BUN 16, creatinine 0.74, glucose 102, calcium 9.3, normal LFTs, albumin 4.4, WBC 6.4, Hgb 15.1 and platelet count 268.  HS troponin level 4--4  BNP level 29.    Family History:   Family History   Problem Relation Age of Onset    Heart disease Mother     No Known Problems Father     No Known Problems Brother     No Known Problems Brother     No Known Problems  Maternal Aunt     Breast cancer Paternal Aunt         50's    Allergy (severe) Paternal Uncle         bee venom    Other Maternal Grandmother         Thyroid disorder    No Known Problems Maternal Grandfather     No Known Problems Paternal Grandmother     No Known Problems Paternal Grandfather     No Known Problems Son     No Known Problems Son     No Known Problems Son     Breast cancer Cousin         age unknown, 40's    Breast cancer Cousin         early 50's     Historical Information   Past Medical History:   Diagnosis Date    Allergic rhinitis     Anaphylaxis     Asthma     GERD (gastroesophageal reflux disease)     Glaucoma     Hypothyroidism      Past Surgical History:   Procedure Laterality Date    EYE SURGERY Left 06/08/2020     macular gap - 06/08/2020 & 11/02/2020     Social History   Social History     Substance and Sexual Activity   Alcohol Use Yes    Comment: social     Social History     Substance and Sexual Activity   Drug Use No     Social History     Tobacco Use   Smoking Status Never   Smokeless Tobacco Never     Family History:   Family History   Problem Relation Age of Onset    Heart disease Mother     No Known Problems Father     No Known Problems Brother     No Known Problems Brother     No Known Problems Maternal Aunt     Breast cancer Paternal Aunt         50's    Allergy (severe) Paternal Uncle         bee venom    Other Maternal Grandmother         Thyroid disorder    No Known Problems Maternal Grandfather     No Known Problems Paternal Grandmother     No Known Problems Paternal Grandfather     No Known Problems Son     No Known Problems Son     No Known Problems Son     Breast cancer Cousin         age unknown, 40's    Breast cancer Cousin         early 50's       Review of Systems:  Review of Systems   Constitutional:  Negative for chills, fatigue and fever.   Eyes:  Negative for visual disturbance.   Respiratory:  Negative for cough, chest tightness and shortness of breath.     Cardiovascular:  Positive for palpitations. Negative for chest pain and leg swelling.   Gastrointestinal:  Negative for abdominal pain.   Neurological:  Negative for dizziness, light-headedness and headaches.   All other systems reviewed and are negative.          Scheduled Meds:  Current Facility-Administered Medications   Medication Dose Route Frequency Provider Last Rate    acetaminophen  650 mg Oral Q6H PRN Angie Cárdenas MD      budesonide-formoterol  1 puff Inhalation QAM Angie Cárdenas MD      enoxaparin  40 mg Subcutaneous Daily Angie Cárdenas MD      fluticasone  2 spray Nasal HS Angie Cárdenas MD      latanoprost  1 drop Both Eyes HS Angie Cárdenas MD      [START ON 6/10/2024] levothyroxine  25 mcg Oral Once per day on Monday Tuesday Wednesday Thursday Friday Angie Cárdenas MD      levothyroxine  50 mcg Oral Once per day on Sunday Saturday Angie Cárdenas MD      loratadine  10 mg Oral Daily Angie Cárdenas MD      multivitamin stress formula  1 tablet Oral Daily Angie Cárdenas MD      pantoprazole  40 mg Oral Daily Before Breakfast Angie Cárdenas MD      pravastatin  40 mg Oral Daily With Dinner Angie Cárdenas MD      sodium chloride (PF)  3 mL Intravenous Q1H PRN Vick Marsh MD       Continuous Infusions:   PRN Meds:.  acetaminophen    Insert peripheral IV **AND** sodium chloride (PF)  all current active meds have been reviewed and current meds:   Current Facility-Administered Medications   Medication Dose Route Frequency    acetaminophen (TYLENOL) tablet 650 mg  650 mg Oral Q6H PRN    budesonide-formoterol (SYMBICORT) 160-4.5 mcg/act inhaler 1 puff  1 puff Inhalation QAM    enoxaparin (LOVENOX) subcutaneous injection 40 mg  40 mg Subcutaneous Daily    fluticasone (FLONASE) 50 mcg/act nasal spray 2 spray  2 spray Nasal HS    latanoprost (XALATAN) 0.005 % ophthalmic solution 1 drop  1 drop Both Eyes HS    [START ON 6/10/2024] levothyroxine tablet 25 mcg  25 mcg Oral Once per day on Monday Tuesday  "Wednesday Thursday Friday    levothyroxine tablet 50 mcg  50 mcg Oral Once per day on Sunday Saturday    loratadine (CLARITIN) tablet 10 mg  10 mg Oral Daily    multivitamin stress formula tablet 1 tablet  1 tablet Oral Daily    pantoprazole (PROTONIX) EC tablet 40 mg  40 mg Oral Daily Before Breakfast    pravastatin (PRAVACHOL) tablet 40 mg  40 mg Oral Daily With Dinner    sodium chloride (PF) 0.9 % injection 3 mL  3 mL Intravenous Q1H PRN       Allergies   Allergen Reactions    Bee Venom Anaphylaxis     Hornets, wasp, and bees    Codeine Nausea Only and Vomiting     Reaction Date: 24Aug2011;     Other      Narcotic ---nausea and vomiting     Oxycodone-Acetaminophen Nausea Only and Vomiting     Reaction Date: 24Aug2011;     Vicodin  [Hydrocodone-Acetaminophen] Nausea Only and Vomiting     Reaction Date: 24Aug2011;     Sulfa Antibiotics Rash       Objective   Vitals: Blood pressure 139/80, pulse 79, temperature 99.3 °F (37.4 °C), resp. rate 18, height 5' 1\" (1.549 m), weight 85.3 kg (188 lb), SpO2 95%., Body mass index is 35.52 kg/m².,   Orthostatic Blood Pressures      Flowsheet Row Most Recent Value   Blood Pressure 139/80 filed at 06/09/2024 0829   Patient Position - Orthostatic VS Sitting filed at 06/08/2024 1417              Intake/Output Summary (Last 24 hours) at 6/9/2024 0841  Last data filed at 6/9/2024 0801  Gross per 24 hour   Intake --   Output 400 ml   Net -400 ml       Invasive Devices       Peripheral Intravenous Line  Duration             Peripheral IV 06/08/24 Right;Ventral (anterior) Forearm 1 day                    Physical Exam:  Physical Exam  Vitals and nursing note reviewed.   Constitutional:       General: She is not in acute distress.     Appearance: She is obese. She is not diaphoretic.   HENT:      Head: Normocephalic and atraumatic.   Eyes:      General: No scleral icterus.  Cardiovascular:      Rate and Rhythm: Normal rate and regular rhythm.      Pulses: Normal pulses.      Heart " sounds: Normal heart sounds.   Pulmonary:      Effort: Pulmonary effort is normal.      Breath sounds: Normal breath sounds. No wheezing or rales.   Abdominal:      Palpations: Abdomen is soft.   Musculoskeletal:      Right lower leg: No edema.      Left lower leg: No edema.   Skin:     General: Skin is warm and dry.      Capillary Refill: Capillary refill takes less than 2 seconds.   Neurological:      General: No focal deficit present.      Mental Status: She is alert and oriented to person, place, and time.   Psychiatric:         Mood and Affect: Mood normal.         Lab Results:   Recent Results (from the past 24 hour(s))   Basic metabolic panel    Collection Time: 06/09/24  7:00 AM   Result Value Ref Range    Sodium 138 135 - 147 mmol/L    Potassium 4.2 3.5 - 5.3 mmol/L    Chloride 104 96 - 108 mmol/L    CO2 28 21 - 32 mmol/L    ANION GAP 6 4 - 13 mmol/L    BUN 15 5 - 25 mg/dL    Creatinine 0.76 0.60 - 1.30 mg/dL    Glucose 102 65 - 140 mg/dL    Glucose, Fasting 102 (H) 65 - 99 mg/dL    Calcium 9.3 8.4 - 10.2 mg/dL    eGFR 79 ml/min/1.73sq m   CBC and differential    Collection Time: 06/09/24  7:00 AM   Result Value Ref Range    WBC 5.42 4.31 - 10.16 Thousand/uL    RBC 4.79 3.81 - 5.12 Million/uL    Hemoglobin 14.6 11.5 - 15.4 g/dL    Hematocrit 45.3 34.8 - 46.1 %    MCV 95 82 - 98 fL    MCH 30.5 26.8 - 34.3 pg    MCHC 32.2 31.4 - 37.4 g/dL    RDW 13.3 11.6 - 15.1 %    MPV 9.3 8.9 - 12.7 fL    Platelets 260 149 - 390 Thousands/uL    nRBC 0 /100 WBCs    Segmented % 59 43 - 75 %    Immature Grans % 0 0 - 2 %    Lymphocytes % 26 14 - 44 %    Monocytes % 9 4 - 12 %    Eosinophils Relative 5 0 - 6 %    Basophils Relative 1 0 - 1 %    Absolute Neutrophils 3.22 1.85 - 7.62 Thousands/µL    Absolute Immature Grans 0.01 0.00 - 0.20 Thousand/uL    Absolute Lymphocytes 1.41 0.60 - 4.47 Thousands/µL    Absolute Monocytes 0.48 0.17 - 1.22 Thousand/µL    Eosinophils Absolute 0.25 0.00 - 0.61 Thousand/µL    Basophils Absolute  0.05 0.00 - 0.10 Thousands/µL       * Please Note: Fluency DirectDictation voice to text software may have been used in the creation of this document. **

## 2024-06-09 NOTE — ASSESSMENT & PLAN NOTE
Patient presents with palpitations  She has been experiencing palpitations for the past 2 weeks, 3-4 times a week, lasting for about 1 hour.   They come on while lying in bed, usually between 3 and 5 AM  No chest pain, no shortness of breath  During these episodes her SBP is elevated to 170s-180s when she checks with her home cuff  The patient has previously been worked up for palpitations outpatient, without significant findings on Holter monitor  EKG in the ED shows sinus rhythm with frequent premature ventricular complexes    Plan:  Monitor Telemetry  Consult cardiology, appreciate recommendations   Consider calcium channel blocker for palpitation symptoms. 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

## 2024-06-09 NOTE — DISCHARGE SUMMARY
"[facility]  Discharge- Cynthia Reyes 1955, 69 y.o. female MRN: [id number]  Unit/Bed#: S -Marcia Encounter: [id number]  Primary Care Provider: Onesimo Kan MD   Date and time admitted to hospital: 6/8/2024  5:22 AM    * Palpitations  Assessment & Plan  Patient presents with palpitations  She has been experiencing palpitations for the past 2 weeks, 3-4 times a week, lasting for about 1 hour.   They come on while lying in bed, usually between 3 and 5 AM  No chest pain, no shortness of breath  During these episodes her SBP is elevated to 170s-180s when she checks with her home cuff  The patient has previously been worked up for palpitations outpatient, without significant findings on Holter monitor  EKG in the ED shows sinus rhythm with frequent premature ventricular complexes    Plan:  Consider calcium channel blocker for palpitation symptoms. 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   \"Indeterminate solid pulmonary nodule measuring 4 mm. In a low-risk patient with a solid nodule <6 mm, recommend no follow-up. In a high-risk patient, CT at 12 months is optional with stronger consideration if there is suspicious nodule morphology and/or upper lobe location.\"    Patient denies any history of smoking. She is low-risk. Follow up is not required.     Gastroesophageal reflux disease without esophagitis  Assessment & Plan  Continue home Protonix 40 mg daily    Mild intermittent asthma without complication  Assessment & Plan  Patient has history of asthma  She does not report wheezing or worsening shortness of breath. Not in acute exacerbation.   Continue home Symbicort 160-4.5 " 1 puff daily    Chronic rhinitis  Assessment & Plan  Continue home Flonase and Loratadine    EDEL (obstructive sleep apnea)  Assessment & Plan   home CPAP machine    Hypothyroidism  Assessment & Plan  Lab Results   Component Value Date    [id number] 5.582 (H) 06/08/2024    FREET4 0.90 06/08/2024      Home regimen: Levothyroxine 25 mcg daily Monday through Friday.  Levothyroxine 50 mcg daily Saturday and Sunday.  Patient took only 25 mcg this morning, as she forgot it was Saturday    Plan:  Continue home regimen    Hyperlipidemia  Assessment & Plan  Continue home statin         Medical Problems       Resolved Problems  Date Reviewed: 6/9/2024   None       Discharging Physician / Practitioner: Raya Rose MD  PCP: Onesimo Kan MD  Admission Date:   Admission Orders (From admission, onward)       Ordered        06/08/24 0844  Place in Observation  Once                          Discharge Date: 06/09/24    Consultations During Hospital Stay:  cardiology    Procedures Performed:   none    Significant Findings / Test Results:   CT chest abdomen pelvis w contrast   Final Result by Grecia Miguel MD (06/08 0801)         1. No acute posttraumatic injury visualized.   2. 4 mm left upper lobe nodule.      Indeterminate solid pulmonary nodule measuring 4 mm. In a low-risk patient with a solid nodule <6 mm, recommend no follow-up. In a high-risk patient, CT at 12 months is optional with stronger consideration if there is suspicious nodule morphology and/or    upper lobe location.               Richardson BANUELOS, et al. Guidelines for Management of Incidental Pulmonary Nodules Detected on CT Images: From the Fleischner Society 2017. Radiology. 2017 Jul;284(1):228-243.   Study was marked in Epic for follow-up notification.      Workstation performed: [id number]         CT recon only thoracic spine   Final Result by Grecia Miguel MD (06/08 0805)      No fracture or traumatic subluxation.   Thoracic spondylosis.                Workstation performed: [id number]         X-ray chest 1 view portable   ED Interpretation by Vick Marsh MD (06/08 0626)   No signs of obvious cardiopulmonary disease      Final Result by Kelly Perez MD (06/08 5845)      No acute cardiopulmonary disease.            Workstation performed: [id number]               Incidental Findings:   none       Test Results Pending at Discharge (will require follow up):   none     Outpatient Tests Requested:  none    Complications:  none    Reason for Admission: palpitations    Hospital Course:   Cynthia Reyes is a 69 y.o. female patient who originally presented to the hospital on 6/8/2024 due to palpitations.  Few PVCs seen on EKG, no abnormalities witnessed on telemetry throughout the course of her hospital stay.  Patient states she still exhibited a few episodes of palpitations which she felt when she was laying flat.  Patient declined any beta-blocker use, as her allergist recommended against it.  May consider calcium channel blockers in the future.  Cardiology was consulted for the patient and patient again subsequently denied beta-blocker usage.  Cardiology would like to follow-up with patient in the outpatient setting to potentially initiate a Holter monitor and stress test.  Echocardiogram order was also placed by cardiology to rule out any acute pathology; unremarkable.  Patient no other symptoms or side effects in relation to the palpitations, and she said they come and go.  Patient is comfortable with following up with cardiology as an outpatient and is stable for discharge to home.    The patient, initially admitted to the hospital as inpatient, was discharged earlier than expected given the following: symptoms resolved, cardiology outpatient .    Please see above list of diagnoses and related plan for additional information.     Condition at Discharge: stable    Discharge Day Visit / Exam:   Subjective:  see progress note  Vitals: Blood Pressure:  "134/76 (06/09/24 1526)  Pulse: 72 (06/09/24 1526)  Temperature: 98.4 °F (36.9 °C) (06/09/24 1526)  Temp Source: Oral (06/08/24 1352)  Respirations: 18 (06/09/24 1526)  Height: 5' 1\" (154.9 cm) (06/09/24 1119)  Weight - Scale: 85.3 kg (188 lb) (06/09/24 1119)  SpO2: 93 % (06/09/24 1526)  Exam:   Physical Exam  Constitutional:       General: She is not in acute distress.  HENT:      Head: Normocephalic and atraumatic.      Right Ear: External ear normal.      Nose: Nose normal.      Mouth/Throat:      Mouth: Mucous membranes are moist.      Pharynx: Oropharynx is clear.   Eyes:      Extraocular Movements: Extraocular movements intact.   Cardiovascular:      Rate and Rhythm: Normal rate and regular rhythm.      Heart sounds: No murmur heard.  Pulmonary:      Effort: Pulmonary effort is normal. No respiratory distress.      Breath sounds: No wheezing or rales.   Abdominal:      General: There is no distension.      Palpations: Abdomen is soft.      Tenderness: There is no abdominal tenderness. There is no guarding.   Musculoskeletal:         General: No swelling or tenderness.   Skin:     General: Skin is warm and dry.   Neurological:      Mental Status: She is alert and oriented to person, place, and time.          Discussion with Family: Patient declined call to .     Discharge instructions/Information to patient and family:   See after visit summary for information provided to patient and family.      Provisions for Follow-Up Care:  See after visit summary for information related to follow-up care and any pertinent home health orders.      Mobility at time of Discharge:   Basic Mobility Inpatient Raw Score: 24  JH-HLM Goal: 8: Walk 250 feet or more  JH-HLM Achieved: 8: Walk 250 feet ot more  HLM Goal achieved. Continue to encourage appropriate mobility.     Disposition:   Home    Planned Readmission: no     Discharge Statement:  I spent 45 minutes discharging the patient. This time was spent on the day " of discharge. I had direct contact with the patient on the day of discharge. Greater than 50% of the total time was spent examining patient, answering all patient questions, arranging and discussing plan of care with patient as well as directly providing post-discharge instructions.  Additional time then spent on discharge activities.    Discharge Medications:  See after visit summary for reconciled discharge medications provided to patient and/or family.      **Please Note: This note may have been constructed using a voice recognition system**

## 2024-06-09 NOTE — ASSESSMENT & PLAN NOTE
Lab Results   Component Value Date    [id number] 5.582 (H) 06/08/2024    FREET4 0.90 06/08/2024      Home regimen: Levothyroxine 25 mcg daily Monday through Friday.  Levothyroxine 50 mcg daily Saturday and Sunday.  Patient took only 25 mcg this morning, as she forgot it was Saturday    Plan:  Continue home regimen

## 2024-06-09 NOTE — PROGRESS NOTES
"[facility]  Progress Note  Name: Cynthia Reyes I  MRN: [id number]  Unit/Bed#: S -01 I Date of Admission: 6/8/2024   Date of Service: 6/9/2024 I Hospital Day: 0    Assessment & Plan   * Palpitations  Assessment & Plan  Patient presents with palpitations  She has been experiencing palpitations for the past 2 weeks, 3-4 times a week, lasting for about 1 hour.   They come on while lying in bed, usually between 3 and 5 AM  No chest pain, no shortness of breath  During these episodes her SBP is elevated to 170s-180s when she checks with her home cuff  The patient has previously been worked up for palpitations outpatient, without significant findings on Holter monitor  EKG in the ED shows sinus rhythm with frequent premature ventricular complexes    Plan:  Monitor Telemetry  Consult cardiology, appreciate recommendations   Consider calcium channel blocker for palpitation symptoms. 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   \"Indeterminate solid pulmonary nodule measuring 4 mm. In a low-risk patient with a solid nodule <6 mm, recommend no follow-up. In a high-risk patient, CT at 12 months is optional with stronger consideration if there is suspicious nodule morphology and/or upper lobe location.\"    Patient denies any history of smoking. She is low-risk. Follow up is not required.     Gastroesophageal reflux disease without esophagitis  Assessment & Plan  Continue home Protonix 40 mg daily    Mild intermittent asthma without complication  Assessment & Plan  Patient has history of asthma  She does not report wheezing or worsening shortness of breath. Not in acute exacerbation.   Continue home Symbicort 160-4.5 1 puff daily    Chronic rhinitis  Assessment & Plan  Continue home Flonase and " Loratadine    EDEL (obstructive sleep apnea)  Assessment & Plan   home CPAP machine    Hypothyroidism  Assessment & Plan  Lab Results   Component Value Date    [id number] 5.582 (H) 06/08/2024    FREET4 0.90 06/08/2024      Home regimen: Levothyroxine 25 mcg daily Monday through Friday.  Levothyroxine 50 mcg daily Saturday and Sunday.  Patient took only 25 mcg this morning, as she forgot it was Saturday    Plan:  Continue home regimen    Hyperlipidemia  Assessment & Plan  Continue home statin                VTE Pharmacologic Prophylaxis: VTE Score: 3 Moderate Risk (Score 3-4) - Pharmacological DVT Prophylaxis Ordered: enoxaparin (Lovenox).    Mobility:   Basic Mobility Inpatient Raw Score: 24  JH-HLM Goal: 8: Walk 250 feet or more  JH-HLM Achieved: 8: Walk 250 feet ot more  JH-HLM Goal achieved. Continue to encourage appropriate mobility.    Patient Centered Rounds: I performed bedside rounds with nursing staff today.   Discussions with Specialists or Other Care Team Provider: Cardiology    Education and Discussions with Family / Patient: Patient declined call to .     Total Time Spent on Date of Encounter in care of patient: 25 mins. This time was spent on one or more of the following: performing physical exam; counseling and coordination of care; obtaining or reviewing history; documenting in the medical record; reviewing/ordering tests, medications or procedures; communicating with other healthcare professionals and discussing with patient's family/caregivers.    Current Length of Stay: 0 day(s)  Current Patient Status: Observation   Certification Statement: The patient will continue to require additional inpatient hospital stay due to palpitations  Discharge Plan: Today or early tomorrow after cardiology.    Code Status: Level 1 - Full Code    Subjective:   Patient examined at bedside.  States she felt more palpitations overnight, she thinks that they are exacerbated when she lays flat.  She says  the palpitations go away when she moves around.  Per nursing, vitals were stable overnight, may be related to anxiety.    Objective:     Vitals:   Temp (24hrs), Av.6 °F (37 °C), Min:97.9 °F (36.6 °C), Max:99.3 °F (37.4 °C)    Temp:  [97.9 °F (36.6 °C)-99.3 °F (37.4 °C)] 99.3 °F (37.4 °C)  HR:  [73-79] 79  Resp:  [16-18] 18  BP: (131-140)/(68-78) 140/78  SpO2:  [92 %-96 %] 94 %  Body mass index is 35.52 kg/m².     Input and Output Summary (last 24 hours):   No intake or output data in the 24 hours ending 24 0744    Physical Exam:   Physical Exam  Constitutional:       General: She is not in acute distress.  HENT:      Head: Normocephalic and atraumatic.      Right Ear: External ear normal.      Nose: Nose normal.      Mouth/Throat:      Mouth: Mucous membranes are moist.      Pharynx: Oropharynx is clear.   Eyes:      Extraocular Movements: Extraocular movements intact.   Cardiovascular:      Rate and Rhythm: Normal rate and regular rhythm.      Heart sounds: No murmur heard.  Pulmonary:      Effort: Pulmonary effort is normal. No respiratory distress.      Breath sounds: No wheezing or rales.   Abdominal:      General: There is no distension.      Palpations: Abdomen is soft.      Tenderness: There is no abdominal tenderness. There is no guarding.   Musculoskeletal:         General: No swelling or tenderness.   Skin:     General: Skin is warm and dry.   Neurological:      Mental Status: She is alert and oriented to person, place, and time.          Additional Data:     Labs:  Results from last 7 days   Lab Units 24  0700   WBC Thousand/uL 5.42   HEMOGLOBIN g/dL 14.6   HEMATOCRIT % 45.3   PLATELETS Thousands/uL 260   SEGS PCT % 59   LYMPHO PCT % 26   MONO PCT % 9   EOS PCT % 5     Results from last 7 days   Lab Units 24  0700 24  0550   SODIUM mmol/L 138 140   POTASSIUM mmol/L 4.2 4.1   CHLORIDE mmol/L 104 106   CO2 mmol/L 28 25   BUN mg/dL 15 16   CREATININE mg/dL 0.76 0.74   ANION GAP  mmol/L 6 9   CALCIUM mg/dL 9.3 9.3   ALBUMIN g/dL  --  4.4   TOTAL BILIRUBIN mg/dL  --  0.27   ALK PHOS U/L  --  82   ALT U/L  --  23   AST U/L  --  22   GLUCOSE RANDOM mg/dL 102 102                       Lines/Drains:  Invasive Devices       Peripheral Intravenous Line  Duration             Peripheral IV 06/08/24 Right;Ventral (anterior) Forearm 1 day                      Telemetry:  Telemetry Orders (From admission, onward)               24 Hour Telemetry Monitoring  Continuous x 24 Hours (Telem)        Expiring   Question:  Reason for 24 Hour Telemetry  Answer:  Arrhythmias requiring acute medical intervention / PPM or ICD malfunction                     Telemetry Reviewed: Normal Sinus Rhythm  Indication for Continued Telemetry Use: Arrthymias requiring medical therapy             Imaging: No pertinent imaging reviewed.    Recent Cultures (last 7 days):         Last 24 Hours Medication List:   Current Facility-Administered Medications   Medication Dose Route Frequency Provider Last Rate    acetaminophen  650 mg Oral Q6H PRN Angie Cárdenas MD      budesonide-formoterol  1 puff Inhalation QAM Angie Cárdenas MD      enoxaparin  40 mg Subcutaneous Daily Angie Cárdenas MD      fluticasone  2 spray Nasal HS Angie Cárdenas MD      latanoprost  1 drop Both Eyes HS Angie Cárdenas MD      [START ON 6/10/2024] levothyroxine  25 mcg Oral Once per day on Monday Tuesday Wednesday Thursday Friday Angie Cárdenas MD      levothyroxine  50 mcg Oral Once per day on Sunday Saturday Angie Cárdenas MD      loratadine  10 mg Oral Daily Angie Cárdensa MD      multivitamin stress formula  1 tablet Oral Daily Angie Cárdenas MD      pantoprazole  40 mg Oral Daily Before Breakfast Angie Cárdenas MD      pravastatin  40 mg Oral Daily With Dinner Angie Cárdenas MD      sodium chloride (PF)  3 mL Intravenous Q1H PRN Vick Marsh MD          Today, Patient Was Seen By: Raya Rose MD    **Please Note: This note may have been  constructed using a voice recognition system.**

## 2024-06-09 NOTE — PLAN OF CARE
Problem: PAIN - ADULT  Goal: Verbalizes/displays adequate comfort level or baseline comfort level  Description: Interventions:  - Encourage patient to monitor pain and request assistance  - Assess pain using appropriate pain scale  - Administer analgesics based on type and severity of pain and evaluate response  - Implement non-pharmacological measures as appropriate and evaluate response  - Consider cultural and social influences on pain and pain management  - Notify physician/advanced practitioner if interventions unsuccessful or patient reports new pain  Outcome: Progressing     Problem: INFECTION - ADULT  Goal: Absence or prevention of progression during hospitalization  Description: INTERVENTIONS:  - Assess and monitor for signs and symptoms of infection  - Monitor lab/diagnostic results  - Monitor all insertion sites, i.e. indwelling lines, tubes, and drains  - Monitor endotracheal if appropriate and nasal secretions for changes in amount and color  - [place] appropriate cooling/warming therapies per order  - Administer medications as ordered  - Instruct and encourage patient and family to use good hand hygiene technique  - Identify and instruct in appropriate isolation precautions for identified infection/condition  Outcome: Progressing  Goal: Absence of fever/infection during neutropenic period  Description: INTERVENTIONS:  - Monitor WBC    Outcome: Progressing     Problem: SAFETY ADULT  Goal: Patient will remain free of falls  Description: INTERVENTIONS:  - Educate patient/family on patient safety including physical limitations  - Instruct patient to call for assistance with activity   - Consult OT/PT to assist with strengthening/mobility   - Keep Call bell within reach  - Keep bed low and locked with side rails adjusted as appropriate  - Keep care items and personal belongings within reach  - Initiate and maintain comfort rounds  - Make Fall Risk Sign visible to staff  - Offer Toileting every  Hours,  in advance of need  - Initiate/Maintain alarm  - Obtain necessary fall risk management equipment:   - Apply yellow socks and bracelet for high fall risk patients  - Consider moving patient to room near nurses station  Outcome: Progressing  Goal: Maintain or return to baseline ADL function  Description: INTERVENTIONS:  -  Assess patient's ability to carry out ADLs; assess patient's baseline for ADL function and identify physical deficits which impact ability to perform ADLs (bathing, care of mouth/teeth, toileting, grooming, dressing, etc.)  - Assess/evaluate cause of self-care deficits   - Assess range of motion  - Assess patient's mobility; develop plan if impaired  - Assess patient's need for assistive devices and provide as appropriate  - Encourage maximum independence but intervene and supervise when necessary  - Involve family in performance of ADLs  - Assess for home care needs following discharge   - Consider OT consult to assist with ADL evaluation and planning for discharge  - Provide patient education as appropriate  Outcome: Progressing  Goal: Maintains/Returns to pre admission functional level  Description: INTERVENTIONS:  - Perform AM-PAC 6 Click Basic Mobility/ Daily Activity assessment daily.  - Set and communicate daily mobility goal to care team and patient/family/caregiver.   - Collaborate with rehabilitation services on mobility goals if consulted  - Perform Range of Motion  times a day.  - Reposition patient every  hours.  - Dangle patient  times a day  - Stand patient  times a day  - Ambulate patient  times a day  - Out of bed to chair  times a day   - Out of bed for meals  times a day  - Out of bed for toileting  - Record patient progress and toleration of activity level   Outcome: Progressing     Problem: DISCHARGE PLANNING  Goal: Discharge to home or other facility with appropriate resources  Description: INTERVENTIONS:  - Identify barriers to discharge w/patient and caregiver  - Arrange for  needed discharge resources and transportation as appropriate  - Identify discharge learning needs (meds, wound care, etc.)  - Arrange for interpretive services to assist at discharge as needed  - Refer to Case Management Department for coordinating discharge planning if the patient needs post-hospital services based on physician/advanced practitioner order or complex needs related to functional status, cognitive ability, or social support system  Outcome: Progressing     Problem: Knowledge Deficit  Goal: Patient/family/caregiver demonstrates understanding of disease process, treatment plan, medications, and discharge instructions  Description: Complete learning assessment and assess knowledge base.  Interventions:  - Provide teaching at level of understanding  - Provide teaching via preferred learning methods  Outcome: Progressing

## 2024-06-09 NOTE — DISCHARGE INSTR - AVS FIRST PAGE
Dear Cynthia Reyes,     It was our pleasure to care for you here at [facility].  It is our hope that we were always able to exceed the expected standards for your care during your stay.  You were hospitalized due to ***.  You were cared for on the *** floor by Raya Rose MD under the service of Candy Melara MD with the [facility] who covers for your primary care physician (PCP), Onesimo Kan MD, while you were hospitalized.  If you have any questions or concerns related to this hospitalization, you may contact us at .  For follow up as well as any medication refills, we recommend that you follow up with your primary care physician.  A registered nurse will reach out to you by phone within a few days after your discharge to answer any additional questions that you may have after going home.  However, at this time we provide for you here, the most important instructions / recommendations at discharge:     Notable Medication Adjustments -   Take all prior to admission medications  Testing Required after Discharge -   None  ** Please contact your PCP to request testing orders for any of the testing recommended here **  Important follow up information -   Follow-up with your PCP within 1 week  Follow-up with cardiology as an outpatient  Other Instructions -   Take all medications as indicated  If any new or worsening symptoms, contact your PCP or go to the nearest emergency department  Please review this entire after visit summary as additional general instructions including medication list, appointments, activity, diet, any pertinent wound care, and other additional recommendations from your care team that may be provided for you.      Sincerely,     Raya Rose MD

## 2024-06-10 ENCOUNTER — TELEPHONE (OUTPATIENT)
Dept: CARDIOLOGY CLINIC | Facility: CLINIC | Age: 69
End: 2024-06-10

## 2024-06-10 ENCOUNTER — TRANSITIONAL CARE MANAGEMENT (OUTPATIENT)
Dept: FAMILY MEDICINE CLINIC | Facility: CLINIC | Age: 69
End: 2024-06-10

## 2024-06-11 ENCOUNTER — OFFICE VISIT (OUTPATIENT)
Dept: FAMILY MEDICINE CLINIC | Facility: CLINIC | Age: 69
End: 2024-06-11
Payer: MEDICARE

## 2024-06-11 VITALS
HEART RATE: 67 BPM | SYSTOLIC BLOOD PRESSURE: 124 MMHG | BODY MASS INDEX: 35.68 KG/M2 | RESPIRATION RATE: 18 BRPM | HEIGHT: 61 IN | WEIGHT: 189 LBS | TEMPERATURE: 97.8 F | OXYGEN SATURATION: 96 % | DIASTOLIC BLOOD PRESSURE: 70 MMHG

## 2024-06-11 DIAGNOSIS — R73.9 HYPERGLYCEMIA: Primary | ICD-10-CM

## 2024-06-11 DIAGNOSIS — R00.2 PALPITATIONS: ICD-10-CM

## 2024-06-11 PROBLEM — E11.8 TYPE 2 DIABETES MELLITUS WITH COMPLICATION, WITHOUT LONG-TERM CURRENT USE OF INSULIN (HCC): Status: RESOLVED | Noted: 2019-05-20 | Resolved: 2024-06-11

## 2024-06-11 PROCEDURE — 99495 TRANSJ CARE MGMT MOD F2F 14D: CPT | Performed by: FAMILY MEDICINE

## 2024-06-11 RX ORDER — TERBINAFINE HYDROCHLORIDE 250 MG/1
250 TABLET ORAL DAILY
COMMUNITY

## 2024-06-11 RX ORDER — UBIDECARENONE 30 MG
CAPSULE ORAL DAILY
COMMUNITY

## 2024-06-11 RX ORDER — GLUCOSAMINE/D3/BOSWELLIA SERRA 1500MG-400
TABLET ORAL DAILY
COMMUNITY

## 2024-06-11 RX ORDER — EPINEPHRINE 0.15 MG/.3ML
0.15 INJECTION INTRAMUSCULAR ONCE
COMMUNITY

## 2024-06-11 NOTE — ASSESSMENT & PLAN NOTE
Hyperglycemia.  A1c stable without much change at 6.3.  Patient will continue to be mindful of carbohydrates

## 2024-06-11 NOTE — PROGRESS NOTES
FAMILY PRACTICE OFFICE VISIT       NAME: Cynthia Reyes  AGE: 69 y.o. SEX: female       : 1955        MRN: [id number]    DATE: 2024  TIME: 5:03 PM    Assessment and Plan     Problem List Items Addressed This Visit       Palpitations     Palpitations.  Patient will follow-up with cardiologist.  She was reluctant to consider starting any medication such as calcium channel blockers.  She will discuss further with cardiologist after obtaining 48-hour Holter monitor and outpatient stress test         Hyperglycemia - Primary     Hyperglycemia.  A1c stable without much change at 6.3.  Patient will continue to be mindful of carbohydrates          TCM Call       Date and time call was made  6/10/2024 11:59 AM    Hospital care reviewed  Records reviewed    Patient was hospitialized at  [facility]    Date of Admission  24    Date of discharge  24    Diagnosis  Palpitations    Disposition  Home    Were the patients medications reviewed and updated  No    Current Symptoms  None          TCM Call       Post hospital issues  None    Should patient be enrolled in anticoag monitoring?  No    Scheduled for follow up?  Yes    Did you obtain your prescribed medications  Yes    Do you need help managing your prescriptions or medications  No    Is transportation to your appointment needed  No    I have advised the patient to call PCP with any new or worsening symptoms  Neeru Espana MA    Living Arrangements  Family members    Support System  Family    Counseling  Family    Counseling topics  Activities of daily living; Importance of RX compliance    Comments  Patient schedule TCM appt with Dr. Kan on 2024 at 3:20 pm             Hospital Course:   Cynthia Reyes is a 69 y.o. female patient who originally presented to the hospital on 2024 due to palpitations.  Few PVCs seen on EKG, no abnormalities witnessed on telemetry throughout the course of her hospital stay.  Patient states she  still exhibited a few episodes of palpitations which she felt when she was laying flat.  Patient declined any beta-blocker use, as her allergist recommended against it.  May consider calcium channel blockers in the future.  Cardiology was consulted for the patient and patient again subsequently denied beta-blocker usage.  Cardiology would like to follow-up with patient in the outpatient setting to potentially initiate a Holter monitor and stress test.  Echocardiogram order was also placed by cardiology to rule out any acute pathology; unremarkable.  Patient no other symptoms or side effects in relation to the palpitations, and she said they come and go.  Patient is comfortable with following up with cardiology as an outpatient and is stable for discharge to home.         Chief Complaint     Chief Complaint   Patient presents with    Transition of Care Management       History of Present Illness     I reviewed the patient's discharge summary from her latest hospitalization.  Patient continues to have some nighttime palpitations but much less so compared to prior to hospitalization.  She is scheduled to meet with cardiologist later this week to consider ordering Holter monitor and stress test as an outpatient.  Patient is scheduled to have a trip to [place] in July and she would like to get these test performed before she leaves.  She denies any chest pain or shortness of breath.  She denies any syncope type of symptoms.  She denies consumption of excessive caffeine or any over-the-counter decongestants.        Review of Systems   Review of Systems   Constitutional: Negative.    HENT: Negative.     Eyes: Negative.    Respiratory: Negative.     Cardiovascular:  Positive for palpitations. Negative for chest pain and leg swelling.   Gastrointestinal: Negative.    Genitourinary: Negative.    Musculoskeletal: Negative.    Skin: Negative.    Neurological: Negative.    Psychiatric/Behavioral: Negative.         Active  Problem List     Patient Active Problem List   Diagnosis    Hyperlipidemia    Esophagitis, reflux    Hypothyroidism    EDEL (obstructive sleep apnea)    Vitamin D deficiency    Insufficient sleep syndrome    Chronic rhinitis    Mild intermittent asthma without complication    Gastroesophageal reflux disease without esophagitis    Obesity (BMI 30-39.9)    Palpitations    Hyperglycemia    Paresthesia    Pruritus    Obesity, morbid (HCC)    Chronic bilateral thoracic back pain    Hot flashes    COVID-19 virus infection    Onychomycosis    Asthma    Abnormal CT scan       Past Medical History:  Past Medical History:   Diagnosis Date    Allergic rhinitis     Anaphylaxis     Asthma     GERD (gastroesophageal reflux disease)     Glaucoma     Hypothyroidism        Past Surgical History:  Past Surgical History:   Procedure Laterality Date    EYE SURGERY Left 06/08/2020     macular gap - 06/08/2020 & 11/02/2020       Family History:  Family History   Problem Relation Age of Onset    Heart disease Mother     No Known Problems Father     No Known Problems Brother     No Known Problems Brother     No Known Problems Maternal Aunt     Breast cancer Paternal Aunt         50's    Allergy (severe) Paternal Uncle         bee venom    Other Maternal Grandmother         Thyroid disorder    No Known Problems Maternal Grandfather     No Known Problems Paternal Grandmother     No Known Problems Paternal Grandfather     No Known Problems Son     No Known Problems Son     No Known Problems Son     Breast cancer Cousin         age unknown, 40's    Breast cancer Cousin         early 50's       Social History:  Social History     Socioeconomic History    Marital status:      Spouse name: Not on file    Number of children: Not on file    Years of education: Not on file    Highest education level: Not on file   Occupational History    Not on file   Tobacco Use    Smoking status: Never    Smokeless tobacco: Never   Vaping Use    Vaping  status: Never Used   Substance and Sexual Activity    Alcohol use: Yes     Comment: social    Drug use: No    Sexual activity: Not Currently   Other Topics Concern    Not on file   Social History Narrative    Not on file     Social Determinants of Health     Financial Resource Strain: Low Risk  (9/27/2023)    Overall Financial Resource Strain (CARDIA)     Difficulty of Paying Living Expenses: Not very hard   Food Insecurity: Not on file   Transportation Needs: No Transportation Needs (9/27/2023)    PRAPARE - Transportation     Lack of Transportation (Medical): No     Lack of Transportation (Non-Medical): No   Physical Activity: Not on file   Stress: Not on file   Social Connections: Not on file   Intimate Partner Violence: Not on file   Housing Stability: Not on file       Objective     Vitals:    06/11/24 1546   BP: 124/70   Pulse: 67   Resp: 18   Temp: 97.8 °F (36.6 °C)   SpO2: 96%     Wt Readings from Last 3 Encounters:   06/11/24 85.7 kg (189 lb)   06/09/24 85.3 kg (188 lb)   04/24/24 85.7 kg (189 lb)       Physical Exam  Constitutional:       General: She is not in acute distress.     Appearance: Normal appearance. She is not ill-appearing.   HENT:      Head: Normocephalic and atraumatic.   Eyes:      General:         Right eye: No discharge.         Left eye: No discharge.      Conjunctiva/sclera: Conjunctivae normal.      Pupils: Pupils are equal, round, and reactive to light.   Neck:      Vascular: No carotid bruit.   Cardiovascular:      Rate and Rhythm: Normal rate and regular rhythm.      Heart sounds: Normal heart sounds. No murmur heard.  Pulmonary:      Effort: Pulmonary effort is normal.      Breath sounds: Normal breath sounds. No wheezing, rhonchi or rales.   Musculoskeletal:      Right lower leg: No edema.      Left lower leg: No edema.   Lymphadenopathy:      Cervical: No cervical adenopathy.   Skin:     Findings: No rash.   Neurological:      General: No focal deficit present.      Mental Status:  She is alert and oriented to person, place, and time.      Cranial Nerves: No cranial nerve deficit.   Psychiatric:         Mood and Affect: Mood normal.         Behavior: Behavior normal.         Thought Content: Thought content normal.         Judgment: Judgment normal.         Pertinent Laboratory/Diagnostic Studies:  Lab Results   Component Value Date    GLUCOSE 106 02/27/2015    BUN 15 06/09/2024    CREATININE 0.76 06/09/2024    CALCIUM 9.3 06/09/2024     11/02/2017    K 4.2 06/09/2024    CO2 28 06/09/2024     06/09/2024     Lab Results   Component Value Date    ALT 23 06/08/2024    AST 22 06/08/2024    ALKPHOS 82 06/08/2024    BILITOT 0.6 11/02/2017       Lab Results   Component Value Date    WBC 5.42 06/09/2024    HGB 14.6 06/09/2024    HCT 45.3 06/09/2024    MCV 95 06/09/2024     06/09/2024       Lab Results   Component Value Date    TSH 2.04 06/26/2019       Lab Results   Component Value Date    CHOL 189 11/02/2017     Lab Results   Component Value Date    TRIG 218 (H) 04/02/2024     Lab Results   Component Value Date    HDL 49 (L) 04/02/2024     Lab Results   Component Value Date    LDLCALC 83 04/02/2024     Lab Results   Component Value Date    HGBA1C 6.2 (H) 04/02/2024       Results for orders placed or performed during the hospital encounter of 06/08/24   CBC and differential   Result Value Ref Range    WBC 6.41 4.31 - 10.16 Thousand/uL    RBC 4.99 3.81 - 5.12 Million/uL    Hemoglobin 15.1 11.5 - 15.4 g/dL    Hematocrit 46.8 (H) 34.8 - 46.1 %    MCV 94 82 - 98 fL    MCH 30.3 26.8 - 34.3 pg    MCHC 32.3 31.4 - 37.4 g/dL    RDW 13.2 11.6 - 15.1 %    MPV 9.4 8.9 - 12.7 fL    Platelets 268 149 - 390 Thousands/uL    nRBC 0 /100 WBCs    Segmented % 59 43 - 75 %    Immature Grans % 0 0 - 2 %    Lymphocytes % 28 14 - 44 %    Monocytes % 9 4 - 12 %    Eosinophils Relative 3 0 - 6 %    Basophils Relative 1 0 - 1 %    Absolute Neutrophils 3.71 1.85 - 7.62 Thousands/µL    Absolute Immature Grans  "0.02 0.00 - 0.20 Thousand/uL    Absolute Lymphocytes 1.82 0.60 - 4.47 Thousands/µL    Absolute Monocytes 0.58 0.17 - 1.22 Thousand/µL    Eosinophils Absolute 0.22 0.00 - 0.61 Thousand/µL    Basophils Absolute 0.06 0.00 - 0.10 Thousands/µL   HS Troponin 0hr (reflex protocol)   Result Value Ref Range    hs TnI 0hr 4 \"Refer to ACS Flowchart\"- see link ng/L   B-Type Natriuretic Peptide(BNP)   Result Value Ref Range    BNP 29 0 - 100 pg/mL   Comprehensive metabolic panel   Result Value Ref Range    Sodium 140 135 - 147 mmol/L    Potassium 4.1 3.5 - 5.3 mmol/L    Chloride 106 96 - 108 mmol/L    CO2 25 21 - 32 mmol/L    ANION GAP 9 4 - 13 mmol/L    BUN 16 5 - 25 mg/dL    Creatinine 0.74 0.60 - 1.30 mg/dL    Glucose 102 65 - 140 mg/dL    Calcium 9.3 8.4 - 10.2 mg/dL    AST 22 13 - 39 U/L    ALT 23 7 - 52 U/L    Alkaline Phosphatase 82 34 - 104 U/L    Total Protein 8.1 6.4 - 8.4 g/dL    Albumin 4.4 3.5 - 5.0 g/dL    Total Bilirubin 0.27 0.20 - 1.00 mg/dL    eGFR 82 ml/min/1.73sq m   Magnesium   Result Value Ref Range    Magnesium 2.0 1.9 - 2.7 mg/dL   Phosphorus   Result Value Ref Range    Phosphorus 4.0 2.3 - 4.1 mg/dL   D-dimer, quantitative   Result Value Ref Range    D-Dimer, Quant 0.62 (H) <0.50 ug/ml FEU   TSH, 3rd generation with Free T4 reflex   Result Value Ref Range    TSH 3RD GENERATON 5.582 (H) 0.450 - 4.500 uIU/mL   UA w Reflex to Microscopic w Reflex to Culture    Specimen: Urine, Clean Catch   Result Value Ref Range    Color, UA Colorless     Clarity, UA Clear     Specific Gravity, UA 1.003 1.003 - 1.030    pH, UA 5.5 4.5, 5.0, 5.5, 6.0, 6.5, 7.0, 7.5, 8.0    Leukocytes, UA Negative Negative    Nitrite, UA Negative Negative    Protein, UA Negative Negative mg/dl    Glucose, UA Negative Negative mg/dl    Ketones, UA Negative Negative mg/dl    Urobilinogen, UA <2.0 <2.0 mg/dl mg/dl    Bilirubin, UA Negative Negative    Occult Blood, UA Negative Negative   CK   Result Value Ref Range    Total CK 92 26 - 192 U/L " "  HS Troponin I 2hr   Result Value Ref Range    hs TnI 2hr 4 \"Refer to ACS Flowchart\"- see link ng/L    Delta 2hr hsTnI 0 <20 ng/L   T4, free   Result Value Ref Range    Free T4 0.90 0.61 - 1.12 ng/dL   Basic metabolic panel   Result Value Ref Range    Sodium 138 135 - 147 mmol/L    Potassium 4.2 3.5 - 5.3 mmol/L    Chloride 104 96 - 108 mmol/L    CO2 28 21 - 32 mmol/L    ANION GAP 6 4 - 13 mmol/L    BUN 15 5 - 25 mg/dL    Creatinine 0.76 0.60 - 1.30 mg/dL    Glucose 102 65 - 140 mg/dL    Glucose, Fasting 102 (H) 65 - 99 mg/dL    Calcium 9.3 8.4 - 10.2 mg/dL    eGFR 79 ml/min/1.73sq m   CBC and differential   Result Value Ref Range    WBC 5.42 4.31 - 10.16 Thousand/uL    RBC 4.79 3.81 - 5.12 Million/uL    Hemoglobin 14.6 11.5 - 15.4 g/dL    Hematocrit 45.3 34.8 - 46.1 %    MCV 95 82 - 98 fL    MCH 30.5 26.8 - 34.3 pg    MCHC 32.2 31.4 - 37.4 g/dL    RDW 13.3 11.6 - 15.1 %    MPV 9.3 8.9 - 12.7 fL    Platelets 260 149 - 390 Thousands/uL    nRBC 0 /100 WBCs    Segmented % 59 43 - 75 %    Immature Grans % 0 0 - 2 %    Lymphocytes % 26 14 - 44 %    Monocytes % 9 4 - 12 %    Eosinophils Relative 5 0 - 6 %    Basophils Relative 1 0 - 1 %    Absolute Neutrophils 3.22 1.85 - 7.62 Thousands/µL    Absolute Immature Grans 0.01 0.00 - 0.20 Thousand/uL    Absolute Lymphocytes 1.41 0.60 - 4.47 Thousands/µL    Absolute Monocytes 0.48 0.17 - 1.22 Thousand/µL    Eosinophils Absolute 0.25 0.00 - 0.61 Thousand/µL    Basophils Absolute 0.05 0.00 - 0.10 Thousands/µL   ECG 12 lead   Result Value Ref Range    Ventricular Rate 83 BPM    Atrial Rate 83 BPM    FL Interval 154 ms    QRSD Interval 80 ms    QT Interval 386 ms    QTC Interval 453 ms    P Axis 49 degrees    QRS Axis 20 degrees    T Wave Axis -27 degrees   ECG 12 lead   Result Value Ref Range    Ventricular Rate 74 BPM    Atrial Rate 74 BPM    FL Interval 166 ms    QRSD Interval 78 ms    QT Interval 378 ms    QTC Interval 419 ms    P Axis 55 degrees    QRS Axis 12 degrees    T " Wave Axis -20 degrees   Echo complete w/ contrast if indicated   Result Value Ref Range    BSA 1.84 m2    A4C EF 62 %    LVIDd 4.60 cm    LVIDS 3.10 cm    IVSd 1.10 cm    LVPWd 0.90 cm    FS 33 28 - 44    MV E' Tissue Velocity Septal 6 cm/s    E/A ratio 0.83     E wave deceleration time 251 ms    MV Peak E Brian 64 cm/s    MV Peak A Brian 0.77 m/s    RVID d 3.1 cm    Tricuspid annular plane systolic excursion 2.10 cm    LA size 4.4 cm    RAA A4C 11.6 cm2    MV stenosis pressure 1/2 time 73 ms    MV valve area p 1/2 method 3.01     Ao root 2.90 cm    Asc Ao 3.2 cm    Left ventricular stroke volume (2D) 58.00 mL    IVS 1.1 cm    LEFT VENTRICLE SYSTOLIC VOLUME (MOD BIPLANE) 2D 37 mL    LV DIASTOLIC VOLUME (MOD BIPLANE) 2D 96 mL    Left Atrium Area-systolic Four Chamber 16.1 cm2    LVSV, 2D 58 mL    LV EF 60        No orders of the defined types were placed in this encounter.      ALLERGIES:  Allergies   Allergen Reactions    Bee Venom Anaphylaxis     Hornets, wasp, and bees    Codeine Nausea Only and Vomiting     Reaction Date: 24Aug2011;     Other      Narcotic ---nausea and vomiting     Oxycodone-Acetaminophen Nausea Only and Vomiting     Reaction Date: 24Aug2011;     Vicodin  [Hydrocodone-Acetaminophen] Nausea Only and Vomiting     Reaction Date: 24Aug2011;     Sulfa Antibiotics Rash       Current Medications     Current Outpatient Medications   Medication Sig Dispense Refill    Biotin 70739 MCG TABS Take by mouth in the morning      budesonide-formoterol (Symbicort) 160-4.5 mcg/act inhaler Inhale 1 puff every morning Rinse mouth after use. 10.2 g 5    Coenzyme Q10 (CoQ10) 30 MG CAPS Take by mouth in the morning      EPINEPHrine (EPIPEN JR) 0.15 mg/0.3 mL SOAJ Inject 0.15 mg into a muscle once      fluticasone (FLONASE) 50 mcg/act nasal spray 2 sprays into each nostril daily      latanoprost (XALATAN) 0.005 % ophthalmic solution 1 drop daily at bedtime      levothyroxine 25 mcg tablet TAKE 1 TABLET MONDAY -     FRIDAY  AND 2 TABLETS       SATURDAY - SUNDAY 116 tablet 3    loratadine (CLARITIN) 10 mg tablet Take 1 tablet by mouth daily      Multiple Vitamin (MULTI-VITAMIN DAILY PO) Take 1 tablet by mouth daily      Omega-3 Fatty Acids (EQL OMEGA 3 FISH OIL PO) Take by mouth in the morning      pantoprazole (PROTONIX) 40 mg tablet Take 1 tablet (40 mg total) by mouth daily 90 tablet 1    simvastatin (ZOCOR) 20 mg tablet Take 1.5 tablets (30 mg total) by mouth daily 30 tablet 0    terbinafine (LamISIL) 250 mg tablet Take 250 mg by mouth daily       No current facility-administered medications for this visit.         Health Maintenance     Health Maintenance   Topic Date Due    Hepatitis C Screening  Never done    Kidney Health Evaluation: Albumin/Creatinine Ratio  Never done    DM Eye Exam  Never done    RSV Vaccine Age 60+ Years (1 - 1-dose 60+ series) Never done    Zoster Vaccine (2 of 3) 05/05/2015    Diabetic Foot Exam  05/16/2019    Colorectal Cancer Screening  12/20/2023    PT PLAN OF CARE  12/28/2023    HEMOGLOBIN A1C  07/02/2024    Urinary Incontinence Screening  09/27/2024    Medicare Annual Wellness Visit (AWV)  09/27/2024    Fall Risk  11/28/2024    Depression Screening  04/01/2025    Kidney Health Evaluation: GFR  06/09/2025    Breast Cancer Screening: Mammogram  07/06/2025    Osteoporosis Screening  Completed    Pneumococcal Vaccine: 65+ Years  Completed    Influenza Vaccine  Completed    COVID-19 Vaccine  Completed    RSV Vaccine age 0-20 Months  Aged Out    HIB Vaccine  Aged Out    IPV Vaccine  Aged Out    Hepatitis A Vaccine  Aged Out    Meningococcal ACWY Vaccine  Aged Out    HPV Vaccine  Aged Out     Immunization History   Administered Date(s) Administered    COVID-19 MODERNA VACC 0.5 ML IM 02/23/2021, 03/23/2021, 10/30/2021, 04/22/2022    COVID-19 Moderna mRNA Vaccine 12 Yr+ 50 mcg/0.5 mL (Spikevax) 10/12/2023    COVID-19 Pfizer Vac BIVALENT Gage-sucrose 12 Yr+ IM 11/02/2022    COVID-19, unspecified 03/20/2021     H1N1, All Formulations 10/30/2009    INFLUENZA 10/01/2015, 10/04/2016, 10/13/2021, 09/20/2022    Influenza, high dose seasonal 0.7 mL 09/04/2020, 09/20/2022, 09/27/2023    Influenza, seasonal, injectable 10/01/2008, 10/01/2016, 10/02/2017    Influenza, seasonal, injectable, preservative free 10/01/2018    Pneumococcal Conjugate 13-Valent 09/04/2020    Pneumococcal Polysaccharide PPV23 11/20/2019    Td (adult), adsorbed 06/01/1999    Zoster 04/30/2014, 03/10/2015       Onesimo Kan MD

## 2024-06-13 ENCOUNTER — OFFICE VISIT (OUTPATIENT)
Dept: CARDIOLOGY CLINIC | Facility: CLINIC | Age: 69
End: 2024-06-13
Payer: MEDICARE

## 2024-06-13 VITALS
HEIGHT: 61 IN | DIASTOLIC BLOOD PRESSURE: 78 MMHG | SYSTOLIC BLOOD PRESSURE: 128 MMHG | OXYGEN SATURATION: 96 % | HEART RATE: 62 BPM | WEIGHT: 189 LBS | BODY MASS INDEX: 35.68 KG/M2

## 2024-06-13 DIAGNOSIS — R07.2 PRECORDIAL PAIN: ICD-10-CM

## 2024-06-13 DIAGNOSIS — E66.01 OBESITY, MORBID (HCC): ICD-10-CM

## 2024-06-13 DIAGNOSIS — I49.8 OTHER SPECIFIED CARDIAC ARRHYTHMIAS: ICD-10-CM

## 2024-06-13 DIAGNOSIS — I49.3 PVC'S (PREMATURE VENTRICULAR CONTRACTIONS): Primary | ICD-10-CM

## 2024-06-13 DIAGNOSIS — R00.2 PALPITATIONS: ICD-10-CM

## 2024-06-13 PROCEDURE — 99214 OFFICE O/P EST MOD 30 MIN: CPT | Performed by: NURSE PRACTITIONER

## 2024-06-13 NOTE — LETTER
2024     Onesimo Kan MD  [address] [facility] [address]    Patient: Cynthia Reyes   YOB: 1955   Date of Visit: 2024       Dear Dr. Kan:    Thank you for referring Cynthia Reyes to me for evaluation. Below are my notes for this consultation.    If you have questions, please do not hesitate to call me. I look forward to following your patient along with you.         Sincerely,        GABBY Mauro        CC: MD Roseann Cantu CRNP  2024  3:27 PM  Sign when Signing Visit  Cardiology   Hospital Follow Up   Office Visit Note  Cynthia Reyes   69 y.o.   female   MRN: [id number]  [facility] [place]  [address] [facility]    [facility] [address]  [phone]    PCP: Onesimo Kan MD  Cardiologist: Will be Dr. Kang          Summary of Plan:  Heart healthy diet, hydration 64 ounces of water a day  48-hour Holter monitor  Stress test  Follow-up with Dr. Kang  Colon Ca screenin2018, up-to-date      Assessment/Plan  Palpitations.  Recent ADM  - 2024  Possibly related to frequent PVCs.  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  Patient prefers not to be on medical therapy if not necessary.  Will hold off until after monitor result is known  Type2 DM.  2024: Hemoglobin A1c 6.2  EDEL, maintained on CPAP  Hyperlipidemia, on simvastatin 30 mg/d.  2024: Calculated LDL 81 non-  Hypothyroidism, continued on levothyroxine.  Euthyroid  Obesity, BMI 35  Cardiac testing  Holter 3/2023: 9.3% burden intermittent ventricular ectopy including trigeminy/bigeminy or single PVCs.  No sustained  episodes.  TTE 6/9/2024.  EF 60%.  Systolic function normal.  Wall motion normal.  Grade 1 DD.  RV normal size and function.  Holter: Pending                  HPI  Cynthia Reyes is a 69 y.o.year old female with known history of CAD heart failure or arrhythmia.  She does have treated sleep apnea, intermittent palpitations.  She is a non-smoker.  She denies excessive alcohol.  She does not follow with cardiology.  In the past she had a Holter monitor, in March 2023 given palpitations.        ADM 6/8 - 6/9/2024  Chief complaint palpitations  EKG: Normal sinus rhythm with frequent PVCs  BNP 29  HS troponin level 4>4  TSH: Will be obtained  Cardiology was consulted  The patient declined use of any AV felicia agents  Echo: Unremarkable.  Preserved LV function  An outpatient Holter monitor and stress test were recommended      6/13/2024  Hospital follow-up                          Review of Systems   Constitutional: Negative for chills.   Cardiovascular:  Negative for chest pain, claudication, cyanosis, dyspnea on exertion, irregular heartbeat, leg swelling, near-syncope, orthopnea, palpitations, paroxysmal nocturnal dyspnea and syncope.   Respiratory:  Negative for cough and shortness of breath.    Gastrointestinal:  Negative for heartburn and nausea.   Neurological:  Negative for dizziness, focal weakness, headaches, light-headedness and weakness.   All other systems reviewed and are negative.            Assessment  There are no diagnoses linked to this encounter.    Past Medical History:   Diagnosis Date   • Allergic rhinitis    • Anaphylaxis    • Asthma    • GERD (gastroesophageal reflux disease)    • Glaucoma    • Hypothyroidism        Past Surgical History:   Procedure Laterality Date   • EYE SURGERY Left 06/08/2020     macular gap - 06/08/2020 & 11/02/2020           Allergies  Allergies   Allergen Reactions   • Bee Venom Anaphylaxis     Hornets, wasp, and bees   • Codeine Nausea Only and Vomiting     Reaction Date:  24Aug2011;    • Other      Narcotic ---nausea and vomiting    • Oxycodone-Acetaminophen Nausea Only and Vomiting     Reaction Date: 24Aug2011;    • Vicodin  [Hydrocodone-Acetaminophen] Nausea Only and Vomiting     Reaction Date: 24Aug2011;    • Sulfa Antibiotics Rash         Medications    Current Outpatient Medications:   •  Biotin 49399 MCG TABS, Take by mouth in the morning, Disp: , Rfl:   •  budesonide-formoterol (Symbicort) 160-4.5 mcg/act inhaler, Inhale 1 puff every morning Rinse mouth after use., Disp: 10.2 g, Rfl: 5  •  Coenzyme Q10 (CoQ10) 30 MG CAPS, Take by mouth in the morning, Disp: , Rfl:   •  EPINEPHrine (EPIPEN JR) 0.15 mg/0.3 mL SOAJ, Inject 0.15 mg into a muscle once, Disp: , Rfl:   •  fluticasone (FLONASE) 50 mcg/act nasal spray, 2 sprays into each nostril daily, Disp: , Rfl:   •  latanoprost (XALATAN) 0.005 % ophthalmic solution, 1 drop daily at bedtime, Disp: , Rfl:   •  levothyroxine 25 mcg tablet, TAKE 1 TABLET MONDAY -     FRIDAY AND 2 TABLETS       SATURDAY - SUNDAY, Disp: 116 tablet, Rfl: 3  •  loratadine (CLARITIN) 10 mg tablet, Take 1 tablet by mouth daily, Disp: , Rfl:   •  Multiple Vitamin (MULTI-VITAMIN DAILY PO), Take 1 tablet by mouth daily, Disp: , Rfl:   •  Omega-3 Fatty Acids (EQL OMEGA 3 FISH OIL PO), Take by mouth in the morning, Disp: , Rfl:   •  pantoprazole (PROTONIX) 40 mg tablet, Take 1 tablet (40 mg total) by mouth daily, Disp: 90 tablet, Rfl: 1  •  simvastatin (ZOCOR) 20 mg tablet, Take 1.5 tablets (30 mg total) by mouth daily, Disp: 30 tablet, Rfl: 0  •  terbinafine (LamISIL) 250 mg tablet, Take 250 mg by mouth daily, Disp: , Rfl:       Social History     Socioeconomic History   • Marital status:      Spouse name: Not on file   • Number of children: Not on file   • Years of education: Not on file   • Highest education level: Not on file   Occupational History   • Not on file   Tobacco Use   • Smoking status: Never   • Smokeless tobacco: Never   Vaping Use   •  Vaping status: Never Used   Substance and Sexual Activity   • Alcohol use: Yes     Comment: social   • Drug use: No   • Sexual activity: Not Currently   Other Topics Concern   • Not on file   Social History Narrative   • Not on file     Social Determinants of Health     Financial Resource Strain: Low Risk  (9/27/2023)    Overall Financial Resource Strain (CARDIA)    • Difficulty of Paying Living Expenses: Not very hard   Food Insecurity: Not on file   Transportation Needs: No Transportation Needs (9/27/2023)    PRAPARE - Transportation    • Lack of Transportation (Medical): No    • Lack of Transportation (Non-Medical): No   Physical Activity: Not on file   Stress: Not on file   Social Connections: Not on file   Intimate Partner Violence: Not on file   Housing Stability: Not on file       Family History   Problem Relation Age of Onset   • Heart disease Mother    • No Known Problems Father    • No Known Problems Brother    • No Known Problems Brother    • No Known Problems Maternal Aunt    • Breast cancer Paternal Aunt         50's   • Allergy (severe) Paternal Uncle         bee venom   • Other Maternal Grandmother         Thyroid disorder   • No Known Problems Maternal Grandfather    • No Known Problems Paternal Grandmother    • No Known Problems Paternal Grandfather    • No Known Problems Son    • No Known Problems Son    • No Known Problems Son    • Breast cancer Cousin         age unknown, 40's   • Breast cancer Cousin         early 50's       Physical Exam  Vitals and nursing note reviewed.   Constitutional:       General: She is not in acute distress.     Appearance: She is obese. She is not diaphoretic.   HENT:      Head: Normocephalic and atraumatic.   Eyes:      Conjunctiva/sclera: Conjunctivae normal.   Cardiovascular:      Rate and Rhythm: Normal rate and regular rhythm.      Pulses: Intact distal pulses.      Heart sounds: Normal heart sounds.   Pulmonary:      Effort: Pulmonary effort is normal.       "Breath sounds: Normal breath sounds.   Abdominal:      General: Bowel sounds are normal.      Palpations: Abdomen is soft.   Musculoskeletal:         General: Normal range of motion.      Cervical back: Normal range of motion and neck supple.   Skin:     General: Skin is warm and dry.   Neurological:      Mental Status: She is alert and oriented to person, place, and time.         Vitals: Height 5' 1\" (1.549 m), weight 85.7 kg (189 lb).   Wt Readings from Last 3 Encounters:   06/13/24 85.7 kg (189 lb)   06/11/24 85.7 kg (189 lb)   06/09/24 85.3 kg (188 lb)           Labs & Results:  Lab Results   Component Value Date    WBC 5.42 06/09/2024    HGB 14.6 06/09/2024    HCT 45.3 06/09/2024    MCV 95 06/09/2024     06/09/2024     BNP   Date Value Ref Range Status   06/08/2024 29 0 - 100 pg/mL Final     No components found for: \"CHEM\"  Total CK   Date Value Ref Range Status   06/08/2024 92 26 - 192 U/L Final     No results found for this or any previous visit.    No results found for this or any previous visit.    No valid procedures specified.  No results found for this or any previous visit.                    This note was completed in part utilizing [employer] direct voice recognition software.   Grammatical errors, random word insertion, spelling mistakes, and incomplete sentences may be an occasional consequence of the system secondary to software limitations, ambient noise and hardware issues. At the time of dictation, efforts were made to edit, clarify and /or correct errors.  Please read the chart carefully and recognize, using context, where substitutions have occurred.  If you have any questions or concerns about the context, text or information contained within the body of this dictation, please contact myself, the provider, for further clarification      "

## 2024-06-13 NOTE — PROGRESS NOTES
Cardiology   Hospital Follow Up   Office Visit Note  Cynthia Reyes   69 y.o.   female   MRN: [id number]  [facility] [place]  [address]    [facility] [address] [phone]    PCP: Onesimo Kan MD  Cardiologist: Will be Dr. Kang          Summary of Plan:  Heart healthy diet, hydration 64 ounces of water a day  48-hour Holter monitor  Stress test  Follow-up with Dr. Kang  Colon Ca screenin2018, up-to-date      Assessment/Plan  Palpitations.  Recent ADM  - 2024  Possibly related to frequent PVCs.  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  Patient prefers not to be on medical therapy if not necessary.  Will hold off until after monitor result is known  Type2 DM.  2024: Hemoglobin A1c 6.2  EDEL, maintained on CPAP  Hyperlipidemia, on simvastatin 30 mg/d.  2024: Calculated LDL 81 non-  Hypothyroidism, continued on levothyroxine.  Euthyroid  Obesity, BMI 35  Cardiac testing  Holter 3/2023: 9.3% burden intermittent ventricular ectopy including trigeminy/bigeminy or single PVCs.  No sustained episodes.  TTE 2024.  EF 60%.  Systolic function normal.  Wall motion normal.  Grade 1 DD.  RV normal size and function.  Holter: Pending                  HPI  Cynthia Reyes is a 69 y.o.year old female with known history of CAD heart failure or arrhythmia.  She does have treated sleep apnea, intermittent palpitations.  She is a non-smoker.  She denies excessive alcohol.  She does not follow with cardiology.  In the past she had a Holter monitor, in 2023 given palpitations.        ADM  - 2024  Chief complaint palpitations  EKG: Normal  sinus rhythm with frequent PVCs  BNP 29  HS troponin level 4>4  TSH: Will be obtained  Cardiology was consulted  The patient declined use of any AV felicia agents  Echo: Unremarkable.  Preserved LV function  An outpatient Holter monitor and stress test were recommended      6/13/2024  Hospital follow-up  She reports she has palpitations that wake her from her sleep.  They do not interfere with any activities of daily living during the day.  She denies chest pain or shortness of breath.  She has precordial discomfort that wakes her from her sleep.  She is less anxious about the palpitations now that she knows that they are something that is not life-threatening.  She is agreeable to a 48-hour Holter monitor, and a nuclear stress test.  She does not use cardiac stimulants.  She would like to return to see Dr. Kang after testing is complete.          Review of Systems   Constitutional: Negative for chills.   Cardiovascular:  Negative for chest pain, claudication, cyanosis, dyspnea on exertion, irregular heartbeat, leg swelling, near-syncope, orthopnea, palpitations, paroxysmal nocturnal dyspnea and syncope.   Respiratory:  Negative for cough and shortness of breath.    Gastrointestinal:  Negative for heartburn and nausea.   Neurological:  Negative for dizziness, focal weakness, headaches, light-headedness and weakness.   All other systems reviewed and are negative.            Assessment  There are no diagnoses linked to this encounter.    Past Medical History:   Diagnosis Date    Allergic rhinitis     Anaphylaxis     Asthma     GERD (gastroesophageal reflux disease)     Glaucoma     Hypothyroidism        Past Surgical History:   Procedure Laterality Date    EYE SURGERY Left 06/08/2020     macular gap - 06/08/2020 & 11/02/2020           Allergies  Allergies   Allergen Reactions    Bee Venom Anaphylaxis     Hornets, wasp, and bees    Codeine Nausea Only and Vomiting     Reaction Date: 24Aug2011;     Other      Narcotic  ---nausea and vomiting     Oxycodone-Acetaminophen Nausea Only and Vomiting     Reaction Date: 24Aug2011;     Vicodin  [Hydrocodone-Acetaminophen] Nausea Only and Vomiting     Reaction Date: 24Aug2011;     Sulfa Antibiotics Rash         Medications    Current Outpatient Medications:     Biotin 46952 MCG TABS, Take by mouth in the morning, Disp: , Rfl:     budesonide-formoterol (Symbicort) 160-4.5 mcg/act inhaler, Inhale 1 puff every morning Rinse mouth after use., Disp: 10.2 g, Rfl: 5    Coenzyme Q10 (CoQ10) 30 MG CAPS, Take by mouth in the morning, Disp: , Rfl:     EPINEPHrine (EPIPEN JR) 0.15 mg/0.3 mL SOAJ, Inject 0.15 mg into a muscle once, Disp: , Rfl:     fluticasone (FLONASE) 50 mcg/act nasal spray, 2 sprays into each nostril daily, Disp: , Rfl:     latanoprost (XALATAN) 0.005 % ophthalmic solution, 1 drop daily at bedtime, Disp: , Rfl:     levothyroxine 25 mcg tablet, TAKE 1 TABLET MONDAY -     FRIDAY AND 2 TABLETS       SATURDAY - SUNDAY, Disp: 116 tablet, Rfl: 3    loratadine (CLARITIN) 10 mg tablet, Take 1 tablet by mouth daily, Disp: , Rfl:     Multiple Vitamin (MULTI-VITAMIN DAILY PO), Take 1 tablet by mouth daily, Disp: , Rfl:     Omega-3 Fatty Acids (EQL OMEGA 3 FISH OIL PO), Take by mouth in the morning, Disp: , Rfl:     pantoprazole (PROTONIX) 40 mg tablet, Take 1 tablet (40 mg total) by mouth daily, Disp: 90 tablet, Rfl: 1    simvastatin (ZOCOR) 20 mg tablet, Take 1.5 tablets (30 mg total) by mouth daily, Disp: 30 tablet, Rfl: 0    terbinafine (LamISIL) 250 mg tablet, Take 250 mg by mouth daily, Disp: , Rfl:       Social History     Socioeconomic History    Marital status:      Spouse name: Not on file    Number of children: Not on file    Years of education: Not on file    Highest education level: Not on file   Occupational History    Not on file   Tobacco Use    Smoking status: Never    Smokeless tobacco: Never   Vaping Use    Vaping status: Never Used   Substance and Sexual Activity     Alcohol use: Yes     Comment: social    Drug use: No    Sexual activity: Not Currently   Other Topics Concern    Not on file   Social History Narrative    Not on file     Social Determinants of Health     Financial Resource Strain: Low Risk  (9/27/2023)    Overall Financial Resource Strain (CARDIA)     Difficulty of Paying Living Expenses: Not very hard   Food Insecurity: Not on file   Transportation Needs: No Transportation Needs (9/27/2023)    PRAPARE - Transportation     Lack of Transportation (Medical): No     Lack of Transportation (Non-Medical): No   Physical Activity: Not on file   Stress: Not on file   Social Connections: Not on file   Intimate Partner Violence: Not on file   Housing Stability: Not on file       Family History   Problem Relation Age of Onset    Heart disease Mother     No Known Problems Father     No Known Problems Brother     No Known Problems Brother     No Known Problems Maternal Aunt     Breast cancer Paternal Aunt         50's    Allergy (severe) Paternal Uncle         bee venom    Other Maternal Grandmother         Thyroid disorder    No Known Problems Maternal Grandfather     No Known Problems Paternal Grandmother     No Known Problems Paternal Grandfather     No Known Problems Son     No Known Problems Son     No Known Problems Son     Breast cancer Cousin         age unknown, 40's    Breast cancer Cousin         early 50's       Physical Exam  Vitals and nursing note reviewed.   Constitutional:       General: She is not in acute distress.     Appearance: She is obese. She is not diaphoretic.   HENT:      Head: Normocephalic and atraumatic.   Eyes:      Conjunctiva/sclera: Conjunctivae normal.   Cardiovascular:      Rate and Rhythm: Normal rate and regular rhythm.      Pulses: Intact distal pulses.      Heart sounds: Normal heart sounds.   Pulmonary:      Effort: Pulmonary effort is normal.      Breath sounds: Normal breath sounds.   Abdominal:      General: Bowel sounds are normal.  "     Palpations: Abdomen is soft.   Musculoskeletal:         General: Normal range of motion.      Cervical back: Normal range of motion and neck supple.   Skin:     General: Skin is warm and dry.   Neurological:      Mental Status: She is alert and oriented to person, place, and time.         Vitals: Height 5' 1\" (1.549 m), weight 85.7 kg (189 lb).   Wt Readings from Last 3 Encounters:   06/13/24 85.7 kg (189 lb)   06/11/24 85.7 kg (189 lb)   06/09/24 85.3 kg (188 lb)           Labs & Results:  Lab Results   Component Value Date    WBC 5.42 06/09/2024    HGB 14.6 06/09/2024    HCT 45.3 06/09/2024    MCV 95 06/09/2024     06/09/2024     BNP   Date Value Ref Range Status   06/08/2024 29 0 - 100 pg/mL Final     No components found for: \"CHEM\"  Total CK   Date Value Ref Range Status   06/08/2024 92 26 - 192 U/L Final     No results found for this or any previous visit.    No results found for this or any previous visit.    No valid procedures specified.  No results found for this or any previous visit.                    This note was completed in part utilizing [employer] direct voice recognition software.   Grammatical errors, random word insertion, spelling mistakes, and incomplete sentences may be an occasional consequence of the system secondary to software limitations, ambient noise and hardware issues. At the time of dictation, efforts were made to edit, clarify and /or correct errors.  Please read the chart carefully and recognize, using context, where substitutions have occurred.  If you have any questions or concerns about the context, text or information contained within the body of this dictation, please contact myself, the provider, for further clarification      "

## 2024-06-30 ENCOUNTER — PATIENT MESSAGE (OUTPATIENT)
Dept: SLEEP CENTER | Facility: CLINIC | Age: 69
End: 2024-06-30

## 2024-07-01 ENCOUNTER — TELEPHONE (OUTPATIENT)
Dept: SLEEP CENTER | Facility: CLINIC | Age: 69
End: 2024-07-01

## 2024-07-01 NOTE — TELEPHONE ENCOUNTER
"Per Melodigram message:  I have been recently seen in theER at [facility] for heart palpitations. The cardiologist said my heart skips a beat. I am having further testing this week and next. My question is this always occurs early in the morning between 3-6am and I am wondering if anything with my breathing or sleep apnea is showing up on my CPAP machine that might contribute to figuring out what is happening and why. Thank you.   ---------------------------------------------    Patient experiencing \"heart missing a beat\", states it always occurs between 3-6am, inquiring if related to EDEL and what compliance shows.    Compliance uploaded to media.    Dr. Oneal, please review and advise.  "

## 2024-07-03 ENCOUNTER — HOSPITAL ENCOUNTER (OUTPATIENT)
Dept: NON INVASIVE DIAGNOSTICS | Facility: CLINIC | Age: 69
Discharge: HOME | End: 2024-07-03
Payer: MEDICARE

## 2024-07-03 DIAGNOSIS — I49.8 OTHER SPECIFIED CARDIAC ARRHYTHMIAS: ICD-10-CM

## 2024-07-03 DIAGNOSIS — R07.2 PRECORDIAL PAIN: ICD-10-CM

## 2024-07-03 DIAGNOSIS — R00.2 PALPITATIONS: ICD-10-CM

## 2024-07-03 DIAGNOSIS — I49.3 PVC'S (PREMATURE VENTRICULAR CONTRACTIONS): ICD-10-CM

## 2024-07-03 LAB
MAX HR PERCENT: 75 %
MAX HR: 113 BPM
NUC STRESS EJECTION FRACTION: 64 %
RATE PRESSURE PRODUCT: NORMAL
SL CV REST NUCLEAR ISOTOPE DOSE: 10.2 MCI
SL CV STRESS NUCLEAR ISOTOPE DOSE: 31.6 MCI
SL CV STRESS RECOVERY BP: NORMAL MMHG
SL CV STRESS RECOVERY HR: 76 BPM
SL CV STRESS RECOVERY O2 SAT: 98 %
STRESS ANGINA INDEX: 0
STRESS BASELINE BP: NORMAL MMHG
STRESS BASELINE HR: 62 BPM
STRESS O2 SAT REST: 98 %
STRESS PEAK HR: 113 BPM
STRESS POST ESTIMATED WORKLOAD: 1.5 METS
STRESS POST EXERCISE DUR MIN: 3 MIN
STRESS POST O2 SAT PEAK: 98 %
STRESS POST PEAK BP: 114 MMHG
STRESS/REST PERFUSION RATIO: 1.19

## 2024-07-03 PROCEDURE — A9502 TC99M TETROFOSMIN: HCPCS

## 2024-07-03 PROCEDURE — 78452 HT MUSCLE IMAGE SPECT MULT: CPT | Performed by: INTERNAL MEDICINE

## 2024-07-03 PROCEDURE — 78452 HT MUSCLE IMAGE SPECT MULT: CPT

## 2024-07-03 PROCEDURE — 93018 CV STRESS TEST I&R ONLY: CPT | Performed by: INTERNAL MEDICINE

## 2024-07-03 PROCEDURE — 93017 CV STRESS TEST TRACING ONLY: CPT

## 2024-07-03 PROCEDURE — 93016 CV STRESS TEST SUPVJ ONLY: CPT | Performed by: INTERNAL MEDICINE

## 2024-07-03 RX ORDER — REGADENOSON 0.08 MG/ML
0.4 INJECTION, SOLUTION INTRAVENOUS ONCE
Status: COMPLETED | OUTPATIENT
Start: 2024-07-03 | End: 2024-07-03

## 2024-07-03 RX ADMIN — REGADENOSON 0.4 MG: 0.08 INJECTION, SOLUTION INTRAVENOUS at 13:43

## 2024-07-06 LAB
MAX DIASTOLIC BP: 82 MMHG
MAX PREDICTED HEART RATE: 150 BPM
PROTOCOL NAME: NORMAL
STRESS POST EXERCISE DUR MIN: 3 MIN
STRESS POST EXERCISE DUR SEC: 0 SEC
STRESS POST PEAK HR: 113 BPM
STRESS POST PEAK SYSTOLIC BP: 132 MMHG
TARGET HR FORMULA: NORMAL

## 2024-07-08 ENCOUNTER — HOSPITAL ENCOUNTER (OUTPATIENT)
Dept: MAMMOGRAPHY | Facility: HOSPITAL | Age: 69
Discharge: HOME | End: 2024-07-08
Payer: MEDICARE

## 2024-07-08 VITALS — BODY MASS INDEX: 35.68 KG/M2 | WEIGHT: 189 LBS | HEIGHT: 61 IN

## 2024-07-08 DIAGNOSIS — Z12.31 SCREENING MAMMOGRAM FOR HIGH-RISK PATIENT: ICD-10-CM

## 2024-07-08 PROCEDURE — 77067 SCR MAMMO BI INCL CAD: CPT

## 2024-07-08 PROCEDURE — 77063 BREAST TOMOSYNTHESIS BI: CPT

## 2024-07-09 ENCOUNTER — HOSPITAL ENCOUNTER (OUTPATIENT)
Dept: NON INVASIVE DIAGNOSTICS | Facility: CLINIC | Age: 69
Discharge: HOME | End: 2024-07-09
Payer: MEDICARE

## 2024-07-09 DIAGNOSIS — R00.2 PALPITATIONS: ICD-10-CM

## 2024-07-09 DIAGNOSIS — I49.3 PVC'S (PREMATURE VENTRICULAR CONTRACTIONS): ICD-10-CM

## 2024-07-09 PROCEDURE — 93225 XTRNL ECG REC<48 HRS REC: CPT

## 2024-07-09 PROCEDURE — 93226 XTRNL ECG REC<48 HR SCAN A/R: CPT

## 2024-07-09 NOTE — TELEPHONE ENCOUNTER
Received call from Natalie at [facility] stating they are unable to provide a compliance report as patient did not get her CPAP machine from [facility].  They left a message for patient and asked for call back with her machine information so they can register her machine with them.  Once this is done, they can obtain compliance.

## 2024-07-12 PROCEDURE — 93227 XTRNL ECG REC<48 HR R&I: CPT | Performed by: INTERNAL MEDICINE

## 2024-07-12 NOTE — TELEPHONE ENCOUNTER
"90-day compliance report scanned to media under \"CPAP results\".     Dr. Oneal, please see 7/1/24 encounter note and advise.  Thanks.   "

## 2024-07-13 DIAGNOSIS — I49.3 SYMPTOMATIC PVCS: Primary | ICD-10-CM

## 2024-07-13 DIAGNOSIS — E78.2 MIXED HYPERLIPIDEMIA: ICD-10-CM

## 2024-07-13 RX ORDER — ATORVASTATIN CALCIUM 40 MG/1
40 TABLET, FILM COATED ORAL DAILY
Qty: 30 TABLET | Refills: 5 | Status: SHIPPED | OUTPATIENT
Start: 2024-07-13

## 2024-07-13 RX ORDER — DILTIAZEM HYDROCHLORIDE 120 MG/1
120 CAPSULE, COATED, EXTENDED RELEASE ORAL DAILY
Qty: 30 CAPSULE | Refills: 5 | Status: SHIPPED | OUTPATIENT
Start: 2024-07-13

## 2024-07-15 ENCOUNTER — TELEPHONE (OUTPATIENT)
Dept: CARDIOLOGY CLINIC | Facility: CLINIC | Age: 69
End: 2024-07-15

## 2024-07-15 NOTE — TELEPHONE ENCOUNTER
----- Message from GABBY Mauro sent at 7/13/2024  8:27 AM EDT -----  Holter monitor reviewed  4.4% burden of PVCs for which she was symptomatic  Her recent nuclear stress test showed no ischemia  Start Cardizem CD1 20 mg daily.  A prescription was sent to her pharmacy she is  intolerant to beta-blockers and advised to avoid them in the past.    Also, Cardizem is not recommended with simvastatin at the dose she is taking, as this can cause muscle pains  I would like her to stop the simvastatin and start atorvastatin 40 mg, 1 tablet daily, which is generic.    There is no interaction with Cardizem and atorvastatin  Please have her keep her follow-up appointment

## 2024-07-15 NOTE — TELEPHONE ENCOUNTER
Pt returned the call. She stated she will be [place] and does not want to start anything new beforehand. She also stated she is very worried about starting the Cardizem as she does not want her BP to drop too much. She stated her BP runs around 128/82.     She is also worried about starting the atorvastatin due to the dose. She stated she was on a higher dose of a statin in the past and had muscle cramps with it and is worried that will happen again with the 40 mg.     Advised pt will send a message to the provider to review and advise.

## 2024-07-16 NOTE — TELEPHONE ENCOUNTER
Spoke with pt- she wanted to know what the lowest dose of the Cardizem she could take. She thinks 120 mg is a lot. Please advise

## 2024-08-29 NOTE — PROGRESS NOTES
Daily Note     Today's date: 2023  Patient name: Colleen Flores  : 1955  MRN: [id number]  Referring provider: Beth Zhong MD  Dx:   Encounter Diagnosis     ICD-10-CM    1. Weakness of both lower extremities  R29.898       2. Weakness of both upper extremities  R29.898           Start Time: 1530  Stop Time: 1610  Total time in clinic (min): 40 minutes    Subjective: When performing the exercises, she states her left arm is weaker than the right. Objective: See treatment diary below    Assessment: Tolerated treatment well. Patient would benefit from continued PT    Plan: Continue per plan of care.       Precautions: not sig      Manuals            Prone P-A mid thoracic mobs LMR - gr 2 & 4            Supine tissue deformation - left subscapularis LMR            A-P GH mobs Gr 1 - LMR                         Neuro Re-Ed                         [place] - lawnmower  5# - 3x9           Cruz - sustained row + walkout  10# x 12 laps           XS - sustained horiz abd + walkout  3# - 2x7                                                  Ther Ex            Ube - rev 3'            Banded row - 5 sec hold - 3 sec release Blue x7            T hold at wall  5"x5 5"x12           HEP LMR LMR           Face pull  Orange 3x9                                                  Ther Activity             Suitcase carry - kb + band  Yellow + 15# - 30 ft x 2 laps each           Bent over row with surge  15# x 15           Gait Training                                       Modalities yes

## 2024-09-09 ENCOUNTER — OFFICE VISIT (OUTPATIENT)
Dept: CARDIOLOGY CLINIC | Facility: CLINIC | Age: 69
End: 2024-09-09
Payer: MEDICARE

## 2024-09-09 VITALS
HEIGHT: 61 IN | SYSTOLIC BLOOD PRESSURE: 112 MMHG | WEIGHT: 190.8 LBS | DIASTOLIC BLOOD PRESSURE: 60 MMHG | HEART RATE: 58 BPM | BODY MASS INDEX: 36.02 KG/M2

## 2024-09-09 DIAGNOSIS — R00.2 PALPITATIONS: Primary | ICD-10-CM

## 2024-09-09 DIAGNOSIS — E78.2 MIXED HYPERLIPIDEMIA: ICD-10-CM

## 2024-09-09 PROCEDURE — 99214 OFFICE O/P EST MOD 30 MIN: CPT | Performed by: INTERNAL MEDICINE

## 2024-09-09 RX ORDER — SIMVASTATIN 20 MG
TABLET ORAL
COMMUNITY
Start: 2024-08-25

## 2024-09-09 NOTE — ASSESSMENT & PLAN NOTE
Due to frequency of PVCs.  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  Currently without aggressive symptoms requiring medical therapy, however if she requires this in the future, we can consider calcium channel blocker.  Echocardiogram revealed normal LV size and function with grade 1 diastolic dysfunction and no significant valvular abnormalities.  Nuclear stress test revealed no significant perfusion abnormalities with a normal ejection fraction.  A 48-hour Holter monitor was also completed revealing ventricular ectopy to be 4.4% of total beats with symptoms correlating with PVCs.

## 2024-09-09 NOTE — PROGRESS NOTES
"Cardiology Follow Up    Cynthia Reyes  1955  [id number]  [facility] [address] [address] [phone]      1. Palpitations  Assessment & Plan:  Due to frequency of PVCs.  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  Currently without aggressive symptoms requiring medical therapy, however if she requires this in the future, we can consider calcium channel blocker.  Echocardiogram revealed normal LV size and function with grade 1 diastolic dysfunction and no significant valvular abnormalities.  Nuclear stress test revealed no significant perfusion abnormalities with a normal ejection fraction.  A 48-hour Holter monitor was also completed revealing ventricular ectopy to be 4.4% of total beats with symptoms correlating with PVCs.  2. Mixed hyperlipidemia  Assessment & Plan:  Continued on statin and omega-3 fatty acids.  LDL noted to be 83 in April 2024.       Chief Complaint   Patient presents with    Follow-up     3 months    Palpitations     In early am hours, much better then previously        Interval History: Patient is here for continued management of frequent PVCs, occasionally symptomatic with palpitations, but improved compared to prior.  No reported chest pain, shortness of breath, lightheadedness, syncope, LE edema, orthopnea, PND, or significant weight changes.  Patient remains active without any increased fatigue out of the ordinary.        Blood pressure 112/60, pulse 58, height 5' 1\" (1.549 m), weight 86.5 kg (190 lb 12.8 oz).      Review of Systems:  Review of Systems   Constitutional:  Negative for activity change, appetite change, chills, diaphoresis, fatigue and unexpected weight change.   HENT:  Negative for hearing loss, nosebleeds and sore " throat.    Eyes:  Negative for photophobia and visual disturbance.   Respiratory:  Negative for cough, chest tightness, shortness of breath and wheezing.    Cardiovascular:  Positive for palpitations. Negative for chest pain and leg swelling.   Gastrointestinal:  Negative for abdominal pain, diarrhea, nausea and vomiting.   Endocrine: Negative for polyuria.   Genitourinary:  Negative for dysuria, frequency and hematuria.   Musculoskeletal:  Negative for arthralgias, back pain, gait problem and neck pain.   Skin:  Negative for pallor and rash.   Neurological:  Negative for dizziness, syncope and headaches.   Hematological:  Does not bruise/bleed easily.   Psychiatric/Behavioral:  Negative for behavioral problems and confusion.        Physical Exam:  Physical Exam  Vitals reviewed.   Constitutional:       Appearance: Normal appearance. She is well-developed. She is not ill-appearing or diaphoretic.   HENT:      Head: Normocephalic and atraumatic.      Nose: Nose normal.   Eyes:      General: No scleral icterus.     Extraocular Movements: Extraocular movements intact.      Pupils: Pupils are equal, round, and reactive to light.   Neck:      Vascular: No JVD.   Cardiovascular:      Rate and Rhythm: Normal rate and regular rhythm.      Heart sounds: Normal heart sounds. No murmur heard.     No friction rub. No gallop.   Pulmonary:      Effort: Pulmonary effort is normal. No respiratory distress.      Breath sounds: Normal breath sounds. No wheezing or rales.   Abdominal:      General: Bowel sounds are normal. There is no distension.      Palpations: Abdomen is soft.      Tenderness: There is no abdominal tenderness.   Musculoskeletal:         General: No deformity. Normal range of motion.      Cervical back: Normal range of motion and neck supple.      Right lower leg: No edema.      Left lower leg: No edema.   Skin:     General: Skin is warm and dry.      Findings: No rash.   Neurological:      Mental Status: She is  alert and oriented to person, place, and time.      Cranial Nerves: No cranial nerve deficit.   Psychiatric:         Mood and Affect: Mood normal.         Behavior: Behavior normal.         Patient Active Problem List   Diagnosis    Hyperlipidemia    Esophagitis, reflux    Hypothyroidism    EDEL (obstructive sleep apnea)    Vitamin D deficiency    Insufficient sleep syndrome    Chronic rhinitis    Mild intermittent asthma without complication    Gastroesophageal reflux disease without esophagitis    Obesity (BMI 30-39.9)    Palpitations    Hyperglycemia    Paresthesia    Pruritus    Obesity, morbid (HCC)    Chronic bilateral thoracic back pain    Hot flashes    COVID-19 virus infection    Onychomycosis    Asthma    Abnormal CT scan     Past Medical History:   Diagnosis Date    Allergic rhinitis     Anaphylaxis     Asthma     GERD (gastroesophageal reflux disease)     Glaucoma     Hypothyroidism      Social History     Socioeconomic History    Marital status:      Spouse name: Not on file    Number of children: Not on file    Years of education: Not on file    Highest education level: Not on file   Occupational History    Not on file   Tobacco Use    Smoking status: Never    Smokeless tobacco: Never   Vaping Use    Vaping status: Never Used   Substance and Sexual Activity    Alcohol use: Yes     Comment: social    Drug use: No    Sexual activity: Not Currently   Other Topics Concern    Not on file   Social History Narrative    Not on file     Social Determinants of Health     Financial Resource Strain: Low Risk  (9/27/2023)    Overall Financial Resource Strain (CARDIA)     Difficulty of Paying Living Expenses: Not very hard   Food Insecurity: Not on file   Transportation Needs: No Transportation Needs (9/27/2023)    PRAPARE - Transportation     Lack of Transportation (Medical): No     Lack of Transportation (Non-Medical): No   Physical Activity: Not on file   Stress: Not on file   Social Connections: Not on  file   Intimate Partner Violence: Not on file   Housing Stability: Not on file      Family History   Problem Relation Age of Onset    Heart disease Mother     No Known Problems Father     No Known Problems Brother     No Known Problems Brother     No Known Problems Maternal Aunt     Breast cancer Paternal Aunt         50's    Allergy (severe) Paternal Uncle         bee venom    Other Maternal Grandmother         Thyroid disorder    No Known Problems Maternal Grandfather     No Known Problems Paternal Grandmother     No Known Problems Paternal Grandfather     No Known Problems Son     No Known Problems Son     No Known Problems Son     Breast cancer Cousin         age unknown, 40's    Breast cancer Cousin         early 50's     Past Surgical History:   Procedure Laterality Date    EYE SURGERY Left 06/08/2020     macular gap - 06/08/2020 & 11/02/2020       Current Outpatient Medications:     Biotin 87471 MCG TABS, Take by mouth in the morning, Disp: , Rfl:     budesonide-formoterol (Symbicort) 160-4.5 mcg/act inhaler, Inhale 1 puff every morning May increase to 2 puffs every 12 hrs when sick. Rinse mouth after use., Disp: 10.2 g, Rfl: 5    Coenzyme Q10 (CoQ10) 30 MG CAPS, Take by mouth in the morning, Disp: , Rfl:     EPINEPHrine (EPIPEN JR) 0.15 mg/0.3 mL SOAJ, Inject 0.15 mg into a muscle once, Disp: , Rfl:     fluticasone (FLONASE) 50 mcg/act nasal spray, 2 sprays into each nostril daily, Disp: , Rfl:     latanoprost (XALATAN) 0.005 % ophthalmic solution, 1 drop daily at bedtime, Disp: , Rfl:     levothyroxine 25 mcg tablet, TAKE 1 TABLET MONDAY -     FRIDAY AND 2 TABLETS       SATURDAY - SUNDAY, Disp: 116 tablet, Rfl: 3    loratadine (CLARITIN) 10 mg tablet, Take 1 tablet by mouth daily, Disp: , Rfl:     Multiple Vitamin (MULTI-VITAMIN DAILY PO), Take 1 tablet by mouth daily, Disp: , Rfl:     Omega-3 Fatty Acids (EQL OMEGA 3 FISH OIL PO), Take by mouth in the morning, Disp: , Rfl:     pantoprazole (PROTONIX) 40 mg  tablet, Take 1 tablet (40 mg total) by mouth daily, Disp: 90 tablet, Rfl: 1    simvastatin (ZOCOR) 20 mg tablet, , Disp: , Rfl:   Allergies   Allergen Reactions    Bee Venom Anaphylaxis     Hornets, wasp, and bees    Codeine Nausea Only and Vomiting     Reaction Date: 24Aug2011;     Other      Narcotic ---nausea and vomiting     Oxycodone-Acetaminophen Nausea Only and Vomiting     Reaction Date: 24Aug2011;     Vicodin  [Hydrocodone-Acetaminophen] Nausea Only and Vomiting     Reaction Date: 24Aug2011;     Sulfa Antibiotics Rash       Labs:  Hospital Outpatient Visit on 07/03/2024   Component Date Value    Rest Nuclear Isotope Dose 07/03/2024 10.20     Stress Nuclear Isotope D* 07/03/2024 31.60     Baseline HR 07/03/2024 62     Baseline BP 07/03/2024 132/82     O2 sat rest 07/03/2024 98     Stress peak HR 07/03/2024 113     Post peak BP 07/03/2024 114     O2 sat peak 07/03/2024 98     Recovery HR 07/03/2024 76     Recovery BP 07/03/2024 140/90     O2 sat recovery 07/03/2024 98     Max HR 07/03/2024 113     Max HR Percent 07/03/2024 75     Exercise duration (min) 07/03/2024 3     Estimated workload 07/03/2024 1.5     Rate Pressure Product 07/03/2024 12,882.0     Angina Index 07/03/2024 0     Stress/rest perfusion ra* 07/03/2024 1.19     EF (%) 07/03/2024 64     Protocol Name 07/03/2024 [facility]     Exercise duration (min) 07/03/2024 3     Exercise duration (sec) 07/03/2024 0     Post Peak Systolic BP 07/03/2024 132     Max Diastolic Bp 07/03/2024 82     Peak HR 07/03/2024 113     Max Predicted Heart Rate 07/03/2024 150     Target Hr Formular 07/03/2024 (220 - Age)*100%    Admission on 06/08/2024, Discharged on 06/09/2024   Component Date Value    Ventricular Rate 06/08/2024 83     Atrial Rate 06/08/2024 83     TX Interval 06/08/2024 154     QRSD Interval 06/08/2024 80     QT Interval 06/08/2024 386     QTC Interval 06/08/2024 453     P Axis 06/08/2024 49     QRS Axis 06/08/2024 20     T Wave Axis 06/08/2024 -27      WBC 06/08/2024 6.41     RBC 06/08/2024 4.99     Hemoglobin 06/08/2024 15.1     Hematocrit 06/08/2024 46.8 (H)     MCV 06/08/2024 94     MCH 06/08/2024 30.3     MCHC 06/08/2024 32.3     RDW 06/08/2024 13.2     MPV 06/08/2024 9.4     Platelets 06/08/2024 268     nRBC 06/08/2024 0     Segmented % 06/08/2024 59     Immature Grans % 06/08/2024 0     Lymphocytes % 06/08/2024 28     Monocytes % 06/08/2024 9     Eosinophils Relative 06/08/2024 3     Basophils Relative 06/08/2024 1     Absolute Neutrophils 06/08/2024 3.71     Absolute Immature Grans 06/08/2024 0.02     Absolute Lymphocytes 06/08/2024 1.82     Absolute Monocytes 06/08/2024 0.58     Eosinophils Absolute 06/08/2024 0.22     Basophils Absolute 06/08/2024 0.06     hs TnI 0hr 06/08/2024 4     BNP 06/08/2024 29     Sodium 06/08/2024 140     Potassium 06/08/2024 4.1     Chloride 06/08/2024 106     CO2 06/08/2024 25     ANION GAP 06/08/2024 9     BUN 06/08/2024 16     Creatinine 06/08/2024 0.74     Glucose 06/08/2024 102     Calcium 06/08/2024 9.3     AST 06/08/2024 22     ALT 06/08/2024 23     Alkaline Phosphatase 06/08/2024 82     Total Protein 06/08/2024 8.1     Albumin 06/08/2024 4.4     Total Bilirubin 06/08/2024 0.27     eGFR 06/08/2024 82     Magnesium 06/08/2024 2.0     Phosphorus 06/08/2024 4.0     D-Dimer, Quant 06/08/2024 0.62 (H)     TSH 3RD GENERATON 06/08/2024 5.582 (H)     Color, UA 06/08/2024 Colorless     Clarity, UA 06/08/2024 Clear     Specific Gravity, UA 06/08/2024 1.003     pH, UA 06/08/2024 5.5     Leukocytes, UA 06/08/2024 Negative     Nitrite, UA 06/08/2024 Negative     Protein, UA 06/08/2024 Negative     Glucose, UA 06/08/2024 Negative     Ketones, UA 06/08/2024 Negative     Urobilinogen, UA 06/08/2024 <2.0     Bilirubin, UA 06/08/2024 Negative     Occult Blood, UA 06/08/2024 Negative     Total CK 06/08/2024 92     hs TnI 2hr 06/08/2024 4     Delta 2hr hsTnI 06/08/2024 0     Free T4 06/08/2024 0.90     Ventricular Rate 06/08/2024 74      Atrial Rate 06/08/2024 74     NE Interval 06/08/2024 166     QRSD Interval 06/08/2024 78     QT Interval 06/08/2024 378     QTC Interval 06/08/2024 419     P Axis 06/08/2024 55     QRS Axis 06/08/2024 12     T Wave [place] 06/08/2024 -20     Sodium 06/09/2024 138     Potassium 06/09/2024 4.2     Chloride 06/09/2024 104     CO2 06/09/2024 28     ANION GAP 06/09/2024 6     BUN 06/09/2024 15     Creatinine 06/09/2024 0.76     Glucose 06/09/2024 102     Glucose, Fasting 06/09/2024 102 (H)     Calcium 06/09/2024 9.3     eGFR 06/09/2024 79     WBC 06/09/2024 5.42     RBC 06/09/2024 4.79     Hemoglobin 06/09/2024 14.6     Hematocrit 06/09/2024 45.3     MCV 06/09/2024 95     MCH 06/09/2024 30.5     MCHC 06/09/2024 32.2     RDW 06/09/2024 13.3     MPV 06/09/2024 9.3     Platelets 06/09/2024 260     nRBC 06/09/2024 0     Segmented % 06/09/2024 59     Immature Grans % 06/09/2024 0     Lymphocytes % 06/09/2024 26     Monocytes % 06/09/2024 9     Eosinophils Relative 06/09/2024 5     Basophils Relative 06/09/2024 1     Absolute Neutrophils 06/09/2024 3.22     Absolute Immature Grans 06/09/2024 0.01     Absolute Lymphocytes 06/09/2024 1.41     Absolute Monocytes 06/09/2024 0.48     Eosinophils Absolute 06/09/2024 0.25     Basophils Absolute 06/09/2024 0.05     BSA 06/09/2024 1.84     A4C EF 06/09/2024 62     LVIDd 06/09/2024 4.60     LVIDS 06/09/2024 3.10     IVSd 06/09/2024 1.10     LVPWd 06/09/2024 0.90     FS 06/09/2024 33     MV E' Tissue Velocity Se* 06/09/2024 6     E/A ratio 06/09/2024 0.83     E wave deceleration time 06/09/2024 251     MV Peak E Brian 06/09/2024 64     MV Peak A Brian 06/09/2024 0.77     RVID d 06/09/2024 3.1     Tricuspid annular plane * 06/09/2024 2.10     LA size 06/09/2024 4.4     RAA A4C 06/09/2024 11.6     MV stenosis pressure 1/2* 06/09/2024 73     MV valve area p 1/2 meth* 06/09/2024 3.01     Ao root 06/09/2024 2.90     Asc Ao 06/09/2024 3.2     Left ventricular stroke * 06/09/2024 58.00     IVS  06/09/2024 1.1     LEFT VENTRICLE SYSTOLIC * 06/09/2024 37     LV DIASTOLIC VOLUME (MOD* 06/09/2024 96     Left Atrium Area-systoli* 06/09/2024 16.1     LVSV, 2D 06/09/2024 58     LV EF 06/09/2024 60    Appointment on 04/02/2024   Component Date Value    WBC 04/02/2024 4.57     RBC 04/02/2024 4.76     Hemoglobin 04/02/2024 14.6     Hematocrit 04/02/2024 44.7     MCV 04/02/2024 94     MCH 04/02/2024 30.7     MCHC 04/02/2024 32.7     RDW 04/02/2024 13.0     Platelets 04/02/2024 265     MPV 04/02/2024 10.0     Sodium 04/02/2024 139     Potassium 04/02/2024 4.2     Chloride 04/02/2024 104     CO2 04/02/2024 27     ANION GAP 04/02/2024 8     BUN 04/02/2024 11     Creatinine 04/02/2024 0.68     Glucose, Fasting 04/02/2024 94     Calcium 04/02/2024 9.2     AST 04/02/2024 27     ALT 04/02/2024 23     Alkaline Phosphatase 04/02/2024 57     Total Protein 04/02/2024 7.7     Albumin 04/02/2024 4.2     Total Bilirubin 04/02/2024 0.58     eGFR 04/02/2024 88     Cholesterol 04/02/2024 176     Triglycerides 04/02/2024 218 (H)     HDL, Direct 04/02/2024 49 (L)     LDL Calculated 04/02/2024 83     Non-HDL-Chol (CHOL-HDL) 04/02/2024 127     Hemoglobin A1C 04/02/2024 6.2 (H)     EAG 04/02/2024 131      Lab Results   Component Value Date    CHOL 189 11/02/2017    TRIG 218 (H) 04/02/2024    TRIG 172 (H) 06/26/2019    HDL 49 (L) 04/02/2024    HDL 43 (L) 06/26/2019     Imaging: No results found.

## 2024-09-26 ENCOUNTER — RA CDI HCC (OUTPATIENT)
Dept: OTHER | Facility: HOSPITAL | Age: 69
End: 2024-09-26

## 2024-10-03 ENCOUNTER — OFFICE VISIT (OUTPATIENT)
Dept: FAMILY MEDICINE CLINIC | Facility: CLINIC | Age: 69
End: 2024-10-03
Payer: MEDICARE

## 2024-10-03 VITALS
WEIGHT: 192 LBS | HEART RATE: 72 BPM | TEMPERATURE: 97.6 F | RESPIRATION RATE: 18 BRPM | HEIGHT: 61 IN | DIASTOLIC BLOOD PRESSURE: 80 MMHG | SYSTOLIC BLOOD PRESSURE: 130 MMHG | BODY MASS INDEX: 36.25 KG/M2 | OXYGEN SATURATION: 97 %

## 2024-10-03 DIAGNOSIS — J31.0 CHRONIC RHINITIS: ICD-10-CM

## 2024-10-03 DIAGNOSIS — E03.9 HYPOTHYROIDISM, UNSPECIFIED TYPE: ICD-10-CM

## 2024-10-03 DIAGNOSIS — Z00.00 MEDICARE ANNUAL WELLNESS VISIT, SUBSEQUENT: Primary | ICD-10-CM

## 2024-10-03 PROCEDURE — G0439 PPPS, SUBSEQ VISIT: HCPCS | Performed by: FAMILY MEDICINE

## 2024-10-03 PROCEDURE — 99213 OFFICE O/P EST LOW 20 MIN: CPT | Performed by: FAMILY MEDICINE

## 2024-10-03 NOTE — PATIENT INSTRUCTIONS

## 2024-10-03 NOTE — PROGRESS NOTES
Ambulatory Visit  Name: Cynthia Reyes      : 1955      MRN: [id number]  Encounter Provider: Onesimo Kan MD  Encounter Date: 10/3/2024   Encounter department: [facility]    Assessment & Plan  Medicare annual wellness visit, subsequent         Hypothyroidism, unspecified type  Hypothyroidism.  Patient will check TSH blood work.  She will continue with current dose of levothyroxine    Orders:    TSH, 3rd generation; Future    Chronic rhinitis  Chronic rhinitis.  Patient continue to see her allergist for her history of allergic rhinitis, asthma, and bee venom allergy.            Preventive health issues were discussed with patient, and age appropriate screening tests were ordered as noted in patient's After Visit Summary. Personalized health advice and appropriate referrals for health education or preventive services given if needed, as noted in patient's After Visit Summary.    History of Present Illness     Patient in the office to review chronic medical condition.  He states she had a viral URI after returning from [place] in 2024.  Symptoms lingered for approximately 3 weeks but started feel better at this time.  Recently had her yearly influenza and COVID-19 vaccination.  She does not obtain a regular form of exercise and does not always make the best dietary choices.  Her weight usually fluctuates within a few pounds.  Patient's Cologuard is up-to-date from 2023.       Patient Care Team:  Onesimo Kan MD as PCP - General  Onesimo Kan MD    Review of Systems   Constitutional: Negative.    HENT: Negative.     Eyes: Negative.    Respiratory: Negative.     Cardiovascular: Negative.    Gastrointestinal: Negative.    Genitourinary: Negative.    Musculoskeletal: Negative.    Skin: Negative.    Allergic/Immunologic: Positive for environmental allergies.   Neurological: Negative.    Psychiatric/Behavioral: Negative.       Medical History Reviewed by provider this  encounter:  [facility]       Annual Wellness Visit Questionnaire       Health Risk Assessment:   Patient rates overall health as very good. Patient feels that their physical health rating is slightly better. Patient is satisfied with their life. Eyesight was rated as same. Hearing was rated as slightly worse. Patient feels that their emotional and mental health rating is slightly better. Patients states they are sometimes angry. Patient states they are sometimes unusually tired/fatigued. Pain experienced in the last 7 days has been none. Patient states that she has experienced no weight loss or gain in last 6 months.     Depression Screening:   PHQ-2 Score: 0      Fall Risk Screening:   In the past year, patient has experienced: history of falling in past year      Urinary Incontinence Screening:   Patient has leaked urine accidently in the last six months.     Home Safety:  Patient does not have trouble with stairs inside or outside of their home. Patient has working smoke alarms and has working carbon monoxide detector. Home safety hazards include: none.     Nutrition:   Current diet is Regular.     Medications:   Patient is currently taking over-the-counter supplements. OTC medications include: see medication list. Patient is able to manage medications.     Activities of Daily Living (ADLs)/Instrumental Activities of Daily Living (IADLs):   Walk and transfer into and out of bed and chair?: Yes  Dress and groom yourself?: Yes    Bathe or shower yourself?: Yes    Feed yourself? Yes  Do your laundry/housekeeping?: Yes  Manage your money, pay your bills and track your expenses?: Yes  Make your own meals?: Yes    Do your own shopping?: Yes    Durable Medical Equipment Suppliers  [employer]    Previous Hospitalizations:   Any hospitalizations or ED visits within the last 12 months?: Yes    How many hospitalizations have you had in the last year?: 1-2    Advance Care Planning:   Living will: Yes    Durable POA for healthcare: Yes     Advanced directive: Yes      PREVENTIVE SCREENINGS      Cardiovascular Screening:    General: History Lipid Disorder and Screening Current      Diabetes Screening:     General: Screening Current      Colorectal Cancer Screening:     General: Screening Current      Breast Cancer Screening:     General: Screening Current      Cervical Cancer Screening:    General: Screening Not Indicated      Lung Cancer Screening:     General: Screening Not Indicated    Screening, Brief Intervention, and Referral to Treatment (SBIRT)    Screening  Typical number of drinks in a day: 0  Typical number of drinks in a week: 2  Interpretation: Low risk drinking behavior.    AUDIT-C Screenin) How often did you have a drink containing alcohol in the past year? 2 to 4 times a month  2) How many drinks did you have on a typical day when you were drinking in the past year? 1 to 2  3) How often did you have 6 or more drinks on one occasion in the past year? never    AUDIT-C Score: 2  Interpretation: Score 0-2 (female): Negative screen for alcohol misuse    Single Item Drug Screening:  How often have you used an illegal drug (including marijuana) or a prescription medication for non-medical reasons in the past year? never    Single Item Drug Screen Score: 0  Interpretation: Negative screen for possible drug use disorder    Social Determinants of Health     Financial Resource Strain: Low Risk  (2023)    Overall Financial Resource Strain (CARDIA)     Difficulty of Paying Living Expenses: Not very hard   Food Insecurity: No Food Insecurity (2024)    Hunger Vital Sign     Worried About Running Out of Food in the Last Year: Never true     Ran Out of Food in the Last Year: Never true   Transportation Needs: No Transportation Needs (2024)    PRAPARE - Transportation     Lack of Transportation (Medical): No     Lack of Transportation (Non-Medical): No   Housing Stability: Unknown (2024)    Housing Stability Vital Sign      "Unable to Pay for Housing in the Last Year: No     Homeless in the Last Year: No   Utilities: Not At Risk (9/26/2024)    [facility] Utilities     Threatened with loss of utilities: No     No results found.    Objective     /80   Pulse 72   Temp 97.6 °F (36.4 °C)   Resp 18   Ht 5' 1\" (1.549 m)   Wt 87.1 kg (192 lb)   SpO2 97%   BMI 36.28 kg/m²     Physical Exam  Vitals and nursing note reviewed.   Constitutional:       General: She is not in acute distress.     Appearance: Normal appearance. She is well-developed. She is not ill-appearing.   HENT:      Head: Normocephalic and atraumatic.   Eyes:      General:         Right eye: No discharge.         Left eye: No discharge.      Extraocular Movements: Extraocular movements intact.      Conjunctiva/sclera: Conjunctivae normal.      Pupils: Pupils are equal, round, and reactive to light.   Neck:      Vascular: No carotid bruit.   Cardiovascular:      Rate and Rhythm: Normal rate and regular rhythm.      Heart sounds: No murmur heard.  Pulmonary:      Effort: Pulmonary effort is normal. No respiratory distress.      Breath sounds: Normal breath sounds.   Abdominal:      General: Abdomen is flat. Bowel sounds are normal.      Palpations: Abdomen is soft.      Tenderness: There is no abdominal tenderness. There is no guarding or rebound.   Musculoskeletal:      Right lower leg: No edema.      Left lower leg: No edema.   Lymphadenopathy:      Cervical: No cervical adenopathy.   Skin:     General: Skin is warm and dry.      Capillary Refill: Capillary refill takes less than 2 seconds.   Neurological:      Mental Status: She is alert. Mental status is at baseline.   Psychiatric:         Mood and Affect: Mood normal.         Behavior: Behavior normal.         Thought Content: Thought content normal.         Judgment: Judgment normal.         "

## 2024-10-03 NOTE — ASSESSMENT & PLAN NOTE
Hypothyroidism.  Patient will check TSH blood work.  She will continue with current dose of levothyroxine    Orders:    TSH, 3rd generation; Future

## 2024-10-03 NOTE — ASSESSMENT & PLAN NOTE
Chronic rhinitis.  Patient continue to see her allergist for her history of allergic rhinitis, asthma, and bee venom allergy.

## 2024-10-28 DIAGNOSIS — E78.5 HYPERLIPIDEMIA, UNSPECIFIED HYPERLIPIDEMIA TYPE: Primary | ICD-10-CM

## 2024-10-28 RX ORDER — SIMVASTATIN 20 MG
20 TABLET ORAL
Qty: 90 TABLET | Refills: 3 | Status: SHIPPED | OUTPATIENT
Start: 2024-10-28

## 2024-11-06 DIAGNOSIS — E78.5 HYPERLIPIDEMIA, UNSPECIFIED HYPERLIPIDEMIA TYPE: ICD-10-CM

## 2024-11-06 RX ORDER — SIMVASTATIN 20 MG
20 TABLET ORAL
Qty: 90 TABLET | Refills: 3 | OUTPATIENT
Start: 2024-11-06

## 2024-11-21 ENCOUNTER — APPOINTMENT (OUTPATIENT)
Dept: LAB | Facility: CLINIC | Age: 69
End: 2024-11-21
Payer: MEDICARE

## 2024-11-21 DIAGNOSIS — E03.9 HYPOTHYROIDISM, UNSPECIFIED TYPE: ICD-10-CM

## 2024-11-21 LAB — TSH SERPL DL<=0.05 MIU/L-ACNC: 3.49 UIU/ML (ref 0.45–4.5)

## 2024-11-21 PROCEDURE — 36415 COLL VENOUS BLD VENIPUNCTURE: CPT

## 2024-11-21 PROCEDURE — 84443 ASSAY THYROID STIM HORMONE: CPT

## 2024-11-22 ENCOUNTER — RESULTS FOLLOW-UP (OUTPATIENT)
Dept: FAMILY MEDICINE CLINIC | Facility: CLINIC | Age: 69
End: 2024-11-22

## 2024-12-04 DIAGNOSIS — K21.9 GASTROESOPHAGEAL REFLUX DISEASE WITHOUT ESOPHAGITIS: ICD-10-CM

## 2024-12-05 RX ORDER — PANTOPRAZOLE SODIUM 40 MG/1
40 TABLET, DELAYED RELEASE ORAL DAILY
Qty: 90 TABLET | Refills: 1 | Status: SHIPPED | OUTPATIENT
Start: 2024-12-05

## 2024-12-11 ENCOUNTER — OFFICE VISIT (OUTPATIENT)
Dept: SLEEP CENTER | Facility: CLINIC | Age: 69
End: 2024-12-11
Payer: MEDICARE

## 2024-12-11 VITALS
WEIGHT: 192.6 LBS | BODY MASS INDEX: 36.36 KG/M2 | SYSTOLIC BLOOD PRESSURE: 126 MMHG | DIASTOLIC BLOOD PRESSURE: 78 MMHG | HEIGHT: 61 IN

## 2024-12-11 DIAGNOSIS — G47.33 OSA (OBSTRUCTIVE SLEEP APNEA): Primary | ICD-10-CM

## 2024-12-11 DIAGNOSIS — K21.9 GASTROESOPHAGEAL REFLUX DISEASE WITHOUT ESOPHAGITIS: ICD-10-CM

## 2024-12-11 DIAGNOSIS — J45.20 MILD INTERMITTENT ASTHMA WITHOUT COMPLICATION: ICD-10-CM

## 2024-12-11 DIAGNOSIS — F41.9 ANXIETY: ICD-10-CM

## 2024-12-11 DIAGNOSIS — E66.9 OBESITY (BMI 30-39.9): ICD-10-CM

## 2024-12-11 DIAGNOSIS — J31.0 CHRONIC RHINITIS: ICD-10-CM

## 2024-12-11 PROCEDURE — G2211 COMPLEX E/M VISIT ADD ON: HCPCS | Performed by: INTERNAL MEDICINE

## 2024-12-11 PROCEDURE — 99214 OFFICE O/P EST MOD 30 MIN: CPT | Performed by: INTERNAL MEDICINE

## 2024-12-11 NOTE — PROGRESS NOTES
"  Follow-Up Note - [facility]   Cynthia Reyes  69 y.o. female  :1955  MRN:[id number]  DOS:2024    CC: I saw this patient for follow-up in clinic today for Sleep disordered breathing, Coexisting Sleep and Medical Problems.  Patient received ot a  Dream Station Version 2 machine from [employer] over a year ago.. Interval changes: She had palpitations in  of this year but has resolved since stress has decreased.      A home sleep study on 6/15/16 demonstrated a respiratory event index of 73 per hour and a snore index of 46.9%. Minimum oxygen saturation was 55% and 74.4% of the study was spent with saturations less than 90%. During a subsequent therapeutic study, her sleep disordered breathing was successfully remediated with nasal CPAP at 11 cm of water. She tolerated CPAP and felt a little less drowsy the next day.      PFSH, Problem List, Medications & Allergies were reviewed in EMR.   She  has a past medical history of Allergic rhinitis, Anaphylaxis, Asthma, GERD (gastroesophageal reflux disease), Glaucoma, and Hypothyroidism.    She has a current medication list which includes the following prescription(s): biotin, budesonide-formoterol, coq10, fluticasone, latanoprost, levothyroxine, loratadine, multiple vitamin, omega-3 fatty acids, pantoprazole, simvastatin, and epinephrine.    PHYSIOLOGICAL DATA REVIEW : Using PAP > 4 hours/night 100%. Estimated PARAG 1.3/hour with pressure of 9.9cm H2O@90th/95th percentile; use is limited by  .  INTERPRETATION: Compliance is excellent; Pressure setting is:optimal; ;   SUBJECTIVE: With respect to use of PAP, Cynthia  is experiencing minimal adverse effects: mask discomfort.She derives benefit..  Feels satisfied with sleep and daytime function.   Sleep Routine: Cynthia reports getting (Patient-Rptd) (P) 7 hrs sleep; she usually has difficulty initiating and maintaining sleep  \"unless I am stressed and now thinking about [place] \".  She arises spontaneously " "and (Patient-Rptd) (P) Usually feels refreshed.Cynthia (Patient-Rptd) (P) Rarely]reports excessive daytime sleepiness,.  She rated herself at Total score: (Patient-Rptd) (P) 4 /24 on the Marcell Sleepiness Scale.   Other issues: She feels anxiety is improved and has not been needing any medication.     Habits: Reports that she has never smoked. She has never used smokeless tobacco.,  Reports current alcohol use.,  Reports no history of drug use., Caffeine use:none until  ; Exercise routine: none but is physically active..      ROS: Significant for weight has been stable.  Allergies, asthma and acid reflux are controlled..    EXAM: /78   Ht 5' 1\" (1.549 m)   Wt 87.4 kg (192 lb 9.6 oz)   BMI 36.39 kg/m²     Wt Readings from Last 3 Encounters:   12/11/24 87.4 kg (192 lb 9.6 oz)   10/24/24 87.1 kg (192 lb)   10/03/24 87.1 kg (192 lb)      Patient is well groomed; well appearing.   CNS: Alert, orientated, speech clear & coherent  Psych: cooperative and in no distress. Mental state: Appears normal.  H&N: EOMI; NC/AT: No facial pressure marks, no rashes.    Skin/Extrem: col & hydration normal; no edema  Resp: Respiratory effort is normal  Physical findings otherwise essentially unchanged from previous.    IMPRESSION: Problem List Items & Comorbidities Addressed this Visit    1. EDEL (obstructive sleep apnea)  PAP DME Resupply/Reorder    PAP DME Pressure Change      2. Anxiety        3. Chronic rhinitis        4. Mild intermittent asthma without complication        5. Gastroesophageal reflux disease without esophagitis        6. Obesity (BMI 30-39.9)            PLAN:  I reviewed results of prior studies and physiologic data with the patient.   I discussed treatment options with risks and benefits.  Treatment with  PAP is medically necessary and Cynthia is agreable to continue use.   Care of equipment, methods to improve comfort using PAP and importance of compliance with therapy were discussed.  Pressure setting: " "change 7-11 cmH2O using a nasal interface.    Rx provided to replace supplies and Care coordinated with DME provider.   The patient's current unit has now reached its 5 yr reasonable, useful life and she is eligible for a replacement device but declined at this time.  She will call a few months prior to her next visit for a prescription.  Discussed strategies for weight and stress reduction.    Follow-up is advised in 1 year or sooner if needed to monitor progress, compliance and to adjust therapy.     Thank you for allowing me to participate in the care of this patient.    Sincerely,     Authenticated electronically on 12/11/24   Board Certified Specialist     Portions of the record may have been created with voice recognition software. Occasional wrong word or \"sound a like\" substitutions may have occurred due to the inherent limitations of voice recognition software. There may also be notations and random deletions of words or characters from malfunctioning software. Read the chart carefully and recognize, using context, where substitutions/deletions have occurred.    "

## 2024-12-12 ENCOUNTER — TELEPHONE (OUTPATIENT)
Dept: SLEEP CENTER | Facility: CLINIC | Age: 69
End: 2024-12-12

## 2025-02-18 DIAGNOSIS — E03.9 HYPOTHYROIDISM, UNSPECIFIED TYPE: ICD-10-CM

## 2025-02-18 DIAGNOSIS — E78.5 HYPERLIPIDEMIA, UNSPECIFIED HYPERLIPIDEMIA TYPE: ICD-10-CM

## 2025-02-19 RX ORDER — LEVOTHYROXINE SODIUM 25 UG/1
TABLET ORAL
Qty: 116 TABLET | Refills: 1 | Status: SHIPPED | OUTPATIENT
Start: 2025-02-19

## 2025-02-19 RX ORDER — SIMVASTATIN 20 MG
20 TABLET ORAL DAILY
Qty: 90 TABLET | Refills: 1 | Status: SHIPPED | OUTPATIENT
Start: 2025-02-19

## 2025-03-27 ENCOUNTER — RA CDI HCC (OUTPATIENT)
Dept: OTHER | Facility: HOSPITAL | Age: 70
End: 2025-03-27

## 2025-03-27 DIAGNOSIS — E66.01 MORBID OBESITY (HCC): Primary | ICD-10-CM

## 2025-03-27 PROBLEM — U07.1 COVID-19 VIRUS INFECTION: Status: RESOLVED | Noted: 2022-08-18 | Resolved: 2025-03-27

## 2025-04-03 ENCOUNTER — OFFICE VISIT (OUTPATIENT)
Dept: FAMILY MEDICINE CLINIC | Facility: CLINIC | Age: 70
End: 2025-04-03
Payer: MEDICARE

## 2025-04-03 VITALS
BODY MASS INDEX: 36.86 KG/M2 | WEIGHT: 195.25 LBS | HEIGHT: 61 IN | HEART RATE: 67 BPM | OXYGEN SATURATION: 96 % | SYSTOLIC BLOOD PRESSURE: 134 MMHG | RESPIRATION RATE: 17 BRPM | DIASTOLIC BLOOD PRESSURE: 82 MMHG | TEMPERATURE: 97.5 F

## 2025-04-03 DIAGNOSIS — E78.5 HYPERLIPIDEMIA, UNSPECIFIED HYPERLIPIDEMIA TYPE: ICD-10-CM

## 2025-04-03 DIAGNOSIS — E66.01 OBESITY, MORBID (HCC): ICD-10-CM

## 2025-04-03 DIAGNOSIS — E03.9 HYPOTHYROIDISM, UNSPECIFIED TYPE: Primary | ICD-10-CM

## 2025-04-03 DIAGNOSIS — R73.9 ELEVATED BLOOD SUGAR: ICD-10-CM

## 2025-04-03 DIAGNOSIS — R73.9 HYPERGLYCEMIA: ICD-10-CM

## 2025-04-03 PROCEDURE — 99213 OFFICE O/P EST LOW 20 MIN: CPT | Performed by: FAMILY MEDICINE

## 2025-04-03 PROCEDURE — G2211 COMPLEX E/M VISIT ADD ON: HCPCS | Performed by: FAMILY MEDICINE

## 2025-04-03 NOTE — ASSESSMENT & PLAN NOTE
Hyperglycemia.  Patient will check A1c for further evaluation.  We will make further recommendations pending results of test.

## 2025-04-03 NOTE — PROGRESS NOTES
FAMILY PRACTICE OFFICE VISIT       NAME: Cynthia Reyes  AGE: 70 y.o. SEX: female       : 1955        MRN: [id number]    DATE: 4/3/2025  TIME: 12:09 PM    Assessment and Plan     Problem List Items Addressed This Visit       Hyperlipidemia    Hyperlipidemia.  Patient will check lipid panel blood work continue with current dose of fish oil tablets         Relevant Orders    CBC    Comprehensive metabolic panel    Lipid panel    TSH, 3rd generation    Hypothyroidism - Primary    Hypothyroidism.  Patient will check TSH blood work and continue with current dose of levothyroxine         Relevant Orders    CBC    Comprehensive metabolic panel    Lipid panel    TSH, 3rd generation    Hyperglycemia    Hyperglycemia.  Patient will check A1c for further evaluation.  We will make further recommendations pending results of test.         Relevant Orders    CBC    Comprehensive metabolic panel    Lipid panel    TSH, 3rd generation    Obesity, morbid (HCC)    Obesity.  Patient will obtain blood work as ordered for further evaluation.  Patient did not wish to consider semaglutide products for weight loss.  She will try to be more active with working in her garden this summer and continue walking.          Other Visit Diagnoses         Elevated blood sugar        Relevant Orders    Hemoglobin A1C                Chief Complaint     Chief Complaint   Patient presents with    Follow-up     6 MONTH        History of Present Illness     Patient in the office to discuss chronic medical conditions.  She denies any recent illness.  She continues to be frustrated with slight increase in her weight despite her efforts to walk on most days.  She has tried to modify her diet but also states she does not want to deprive herself of certain foods or desserts.        Review of Systems   Review of Systems   Constitutional:  Positive for unexpected weight change.   HENT: Negative.     Eyes: Negative.    Respiratory: Negative.      Cardiovascular: Negative.    Gastrointestinal: Negative.    Genitourinary: Negative.    Musculoskeletal: Negative.    Skin: Negative.    Neurological: Negative.    Psychiatric/Behavioral: Negative.         Active Problem List     Patient Active Problem List   Diagnosis    Hyperlipidemia    Esophagitis, reflux    Hypothyroidism    EDEL (obstructive sleep apnea)    Vitamin D deficiency    Insufficient sleep syndrome    Chronic rhinitis    Mild intermittent asthma without complication    Gastroesophageal reflux disease without esophagitis    Obesity (BMI 30-39.9)    Palpitations    Hyperglycemia    Paresthesia    Pruritus    Obesity, morbid (HCC)    Chronic bilateral thoracic back pain    Hot flashes    Onychomycosis    Asthma    Abnormal CT scan    Morbid obesity (HCC)       Past Medical History:  Past Medical History:   Diagnosis Date    Allergic rhinitis     Anaphylaxis     Asthma     GERD (gastroesophageal reflux disease)     Glaucoma     Hypothyroidism        Past Surgical History:  Past Surgical History:   Procedure Laterality Date    EYE SURGERY Left 06/08/2020     macular gap - 06/08/2020 & 11/02/2020       Family History:  Family History   Problem Relation Age of Onset    Heart disease Mother     No Known Problems Father     No Known Problems Brother     No Known Problems Brother     No Known Problems Maternal Aunt     Breast cancer Paternal Aunt         50's    Allergy (severe) Paternal Uncle         bee venom    Other Maternal Grandmother         Thyroid disorder    No Known Problems Maternal Grandfather     No Known Problems Paternal Grandmother     No Known Problems Paternal Grandfather     No Known Problems Son     No Known Problems Son     No Known Problems Son     Breast cancer Cousin         age unknown, 40's    Breast cancer Cousin         early 50's       Social History:  Social History     Socioeconomic History    Marital status:      Spouse name: Not on file    Number of children: Not on  file    Years of education: Not on file    Highest education level: Not on file   Occupational History    Not on file   Tobacco Use    Smoking status: Never    Smokeless tobacco: Never   Vaping Use    Vaping status: Never Used   Substance and Sexual Activity    Alcohol use: Yes     Comment: social    Drug use: No    Sexual activity: Not Currently   Other Topics Concern    Not on file   Social History Narrative    Not on file     Social Drivers of Health     Financial Resource Strain: Low Risk  (9/27/2023)    Overall Financial Resource Strain (CARDIA)     Difficulty of Paying Living Expenses: Not very hard   Food Insecurity: No Food Insecurity (9/26/2024)    Nursing - Inadequate Food Risk Classification     Worried About Running Out of Food in the Last Year: Never true     Ran Out of Food in the Last Year: Never true     Ran Out of Food in the Last Year: Not on file   Transportation Needs: No Transportation Needs (9/26/2024)    PRAPARE - Transportation     Lack of Transportation (Medical): No     Lack of Transportation (Non-Medical): No   Physical Activity: Not on file   Stress: Not on file   Social Connections: Not on file   Intimate Partner Violence: Not on file   Housing Stability: Unknown (9/26/2024)    Housing Stability Vital Sign     Unable to Pay for Housing in the Last Year: No     Number of Times Moved in the Last Year: Not on file     Homeless in the Last Year: No       Objective     Vitals:    04/03/25 1114   BP: 134/82   Pulse: 67   Resp: 17   Temp: 97.5 °F (36.4 °C)   SpO2: 96%     Wt Readings from Last 3 Encounters:   04/03/25 88.6 kg (195 lb 4 oz)   12/11/24 87.4 kg (192 lb 9.6 oz)   10/24/24 87.1 kg (192 lb)       Physical Exam  Constitutional:       General: She is not in acute distress.     Appearance: Normal appearance. She is not ill-appearing.   HENT:      Head: Normocephalic and atraumatic.   Eyes:      General:         Right eye: No discharge.         Left eye: No discharge.      Extraocular  Movements: Extraocular movements intact.      Conjunctiva/sclera: Conjunctivae normal.      Pupils: Pupils are equal, round, and reactive to light.   Neck:      Vascular: No carotid bruit.   Cardiovascular:      Rate and Rhythm: Normal rate and regular rhythm.      Heart sounds: Normal heart sounds. No murmur heard.  Pulmonary:      Effort: Pulmonary effort is normal.      Breath sounds: Normal breath sounds. No wheezing, rhonchi or rales.   Musculoskeletal:      Right lower leg: No edema.      Left lower leg: No edema.   Lymphadenopathy:      Cervical: No cervical adenopathy.   Skin:     Findings: No rash.   Neurological:      General: No focal deficit present.      Mental Status: She is alert and oriented to person, place, and time.      Cranial Nerves: No cranial nerve deficit.   Psychiatric:         Mood and Affect: Mood normal.         Behavior: Behavior normal.         Thought Content: Thought content normal.         Judgment: Judgment normal.         Pertinent Laboratory/Diagnostic Studies:  Lab Results   Component Value Date    GLUCOSE 106 02/27/2015    BUN 15 06/09/2024    CREATININE 0.76 06/09/2024    CALCIUM 9.3 06/09/2024     11/02/2017    K 4.2 06/09/2024    CO2 28 06/09/2024     06/09/2024     Lab Results   Component Value Date    ALT 23 06/08/2024    AST 22 06/08/2024    ALKPHOS 82 06/08/2024    BILITOT 0.6 11/02/2017       Lab Results   Component Value Date    WBC 5.42 06/09/2024    HGB 14.6 06/09/2024    HCT 45.3 06/09/2024    MCV 95 06/09/2024     06/09/2024       Lab Results   Component Value Date    TSH 2.04 06/26/2019       Lab Results   Component Value Date    CHOL 189 11/02/2017     Lab Results   Component Value Date    TRIG 218 (H) 04/02/2024     Lab Results   Component Value Date    HDL 49 (L) 04/02/2024     Lab Results   Component Value Date    LDLCALC 83 04/02/2024     Lab Results   Component Value Date    HGBA1C 6.2 (H) 04/02/2024       Results for orders placed or  performed in visit on 11/21/24   TSH, 3rd generation   Result Value Ref Range    TSH 3RD GENERATON 3.493 0.450 - 4.500 uIU/mL       Orders Placed This Encounter   Procedures    CBC    Comprehensive metabolic panel    Lipid panel    TSH, 3rd generation    Hemoglobin A1C       ALLERGIES:  Allergies   Allergen Reactions    Bee Venom Anaphylaxis     Hornets, wasp, and bees    Codeine Nausea Only and Vomiting     Reaction Date: 24Aug2011;     Other      Narcotic ---nausea and vomiting     Oxycodone-Acetaminophen Nausea Only and Vomiting     Reaction Date: 24Aug2011;     Vicodin  [Hydrocodone-Acetaminophen] Nausea Only and Vomiting     Reaction Date: 24Aug2011;     Sulfa Antibiotics Rash       Current Medications     Current Outpatient Medications   Medication Sig Dispense Refill    Biotin 87572 MCG TABS Take by mouth in the morning      Coenzyme Q10 (CoQ10) 30 MG CAPS Take by mouth in the morning      fluticasone (FLONASE) 50 mcg/act nasal spray 2 sprays into each nostril daily      latanoprost (XALATAN) 0.005 % ophthalmic solution 1 drop daily at bedtime      levothyroxine 25 mcg tablet TAKE 1 TABLET ON MONDAY THROUGH FRIDAY AND 2 TABLETS ON SATURDAY AND SUNDAY 116 tablet 1    loratadine (CLARITIN) 10 mg tablet Take 1 tablet by mouth daily      Multiple Vitamin (MULTI-VITAMIN DAILY PO) Take 1 tablet by mouth daily      Omega-3 Fatty Acids (EQL OMEGA 3 FISH OIL PO) Take by mouth in the morning      pantoprazole (PROTONIX) 40 mg tablet TAKE 1 TABLET DAILY 90 tablet 1    simvastatin (ZOCOR) 20 mg tablet TAKE 1 TABLET DAILY 90 tablet 1    budesonide-formoterol (Symbicort) 160-4.5 mcg/act inhaler Inhale 1 puff every morning May increase to 2 puffs every 12 hrs when sick. Rinse mouth after use. 10.2 g 5    EPINEPHrine (EPIPEN JR) 0.15 mg/0.3 mL SOAJ Inject 0.15 mg into a muscle once (Patient not taking: Reported on 10/24/2024)       No current facility-administered medications for this visit.         Health Maintenance      Health Maintenance   Topic Date Due    Hepatitis C Screening  Never done    Zoster Vaccine (2 of 3) 05/05/2015    PT PLAN OF CARE  12/28/2023    COVID-19 Vaccine (9 - 2024-25 season) 03/16/2025    Fall Risk  10/03/2025    Urinary Incontinence Screening  10/03/2025    Medicare Annual Wellness Visit (AWV)  10/03/2025    Depression Screening  04/03/2026    Breast Cancer Screening: Mammogram  07/08/2026    Colorectal Cancer Screening  12/20/2026    Osteoporosis Screening  Completed    RSV Vaccine for Pregnant Patients and Patients Age 60+ Years  Completed    Pneumococcal Vaccine: 65+ Years  Completed    Influenza Vaccine  Completed    Meningococcal B Vaccine  Aged Out    RSV Vaccine age 0-20 Months  Aged Out    HIB Vaccine  Aged Out    IPV Vaccine  Aged Out    Hepatitis A Vaccine  Aged Out    Meningococcal ACWY Vaccine  Aged Out    HPV Vaccine  Aged Out     Immunization History   Administered Date(s) Administered    COVID-19 MODERNA VACC 0.5 ML IM 02/23/2021, 03/23/2021, 10/30/2021, 04/22/2022    COVID-19 Moderna mRNA Vaccine 12 Yr+ 50 mcg/0.5 mL (Spikevax) 10/12/2023, 09/16/2024    COVID-19 Pfizer Vac BIVALENT Gage-sucrose 12 Yr+ IM 11/02/2022    COVID-19, unspecified 03/20/2021    H1N1, All Formulations 10/30/2009    INFLUENZA 10/01/2015, 10/04/2016, 10/13/2021, 09/20/2022    Influenza, high dose seasonal 0.7 mL 09/04/2020, 09/20/2022, 09/27/2023    Influenza, seasonal, injectable 10/01/2008, 10/01/2016, 10/02/2017    Influenza, seasonal, injectable, preservative free 10/01/2018    Pneumococcal Conjugate 13-Valent 09/04/2020    Pneumococcal Polysaccharide PPV23 11/20/2019    Respiratory Syncytial Virus Vaccine (Recombinant) 12/19/2023    Td (adult), adsorbed 06/01/1999    Zoster 04/30/2014, 03/10/2015    influenza, trivalent, adjuvanted 09/16/2024       Onesimo Kan MD

## 2025-04-03 NOTE — ASSESSMENT & PLAN NOTE
Obesity.  Patient will obtain blood work as ordered for further evaluation.  Patient did not wish to consider semaglutide products for weight loss.  She will try to be more active with working in her garden this summer and continue walking.

## 2025-04-03 NOTE — ASSESSMENT & PLAN NOTE
Hyperlipidemia.  Patient will check lipid panel blood work continue with current dose of fish oil tablets

## 2025-05-15 DIAGNOSIS — K21.9 GASTROESOPHAGEAL REFLUX DISEASE WITHOUT ESOPHAGITIS: ICD-10-CM

## 2025-05-15 NOTE — TELEPHONE ENCOUNTER
Reason for call:   [x] Refill   [] Prior Auth  [] Other:     Office:   [x] PCP/Provider - Loreta  [] Specialty/Provider -     Medication: Pantoprazole 40mg    Dose/Frequency: 1 tab daily     Quantity: 90    Pharmacy: [facility] - [address] [phone]     Mail Away Pharmacy   Does the patient have enough for 10 days?   [x] Yes   [] No - Send as HP to POD

## 2025-05-16 RX ORDER — PANTOPRAZOLE SODIUM 40 MG/1
40 TABLET, DELAYED RELEASE ORAL DAILY
Qty: 90 TABLET | Refills: 1 | Status: SHIPPED | OUTPATIENT
Start: 2025-05-16

## 2025-06-25 ENCOUNTER — TRANSCRIBE ORDERS (OUTPATIENT)
Dept: LAB | Facility: CLINIC | Age: 70
End: 2025-06-25

## 2025-06-25 ENCOUNTER — APPOINTMENT (OUTPATIENT)
Dept: LAB | Facility: CLINIC | Age: 70
End: 2025-06-25
Payer: MEDICARE
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
## 2025-06-25 DIAGNOSIS — R73.9 HYPERGLYCEMIA: ICD-10-CM

## 2025-06-25 DIAGNOSIS — R73.9 ELEVATED BLOOD SUGAR: ICD-10-CM

## 2025-06-25 DIAGNOSIS — E78.5 HYPERLIPIDEMIA, UNSPECIFIED HYPERLIPIDEMIA TYPE: ICD-10-CM

## 2025-06-25 DIAGNOSIS — E03.9 HYPOTHYROIDISM, UNSPECIFIED TYPE: ICD-10-CM
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
## 2025-06-25 DIAGNOSIS — S00.462A TICK BITE OF EAR, LEFT, INITIAL ENCOUNTER: Primary | ICD-10-CM

## 2025-06-25 DIAGNOSIS — S00.462A TICK BITE OF EAR, LEFT, INITIAL ENCOUNTER: ICD-10-CM

## 2025-06-25 LAB
ALBUMIN SERPL BCG-MCNC: 4.2 G/DL (ref 3.5–5)
ALP SERPL-CCNC: 67 U/L (ref 34–104)
ALT SERPL W P-5'-P-CCNC: 29 U/L (ref 7–52)
ANION GAP SERPL CALCULATED.3IONS-SCNC: 11 MMOL/L (ref 4–13)
AST SERPL W P-5'-P-CCNC: 28 U/L (ref 13–39)
B BURGDOR IGG+IGM SER QL IA: NEGATIVE
BILIRUB SERPL-MCNC: 0.52 MG/DL (ref 0.2–1)
BUN SERPL-MCNC: 14 MG/DL (ref 5–25)
CALCIUM SERPL-MCNC: 9.3 MG/DL (ref 8.4–10.2)
CHLORIDE SERPL-SCNC: 102 MMOL/L (ref 96–108)
CHOLEST SERPL-MCNC: 180 MG/DL (ref ?–200)
CO2 SERPL-SCNC: 25 MMOL/L (ref 21–32)
CREAT SERPL-MCNC: 0.73 MG/DL (ref 0.6–1.3)
ERYTHROCYTE [DISTWIDTH] IN BLOOD BY AUTOMATED COUNT: 12.9 % (ref 11.6–15.1)
EST. AVERAGE GLUCOSE BLD GHB EST-MCNC: 134 MG/DL
GFR SERPL CREATININE-BSD FRML MDRD: 83 ML/MIN/1.73SQ M
GLUCOSE P FAST SERPL-MCNC: 87 MG/DL (ref 65–99)
HBA1C MFR BLD: 6.3 %
HCT VFR BLD AUTO: 43.9 % (ref 34.8–46.1)
HDLC SERPL-MCNC: 41 MG/DL
HGB BLD-MCNC: 14.3 G/DL (ref 11.5–15.4)
LDLC SERPL CALC-MCNC: 96 MG/DL (ref 0–100)
MCH RBC QN AUTO: 30.4 PG (ref 26.8–34.3)
MCHC RBC AUTO-ENTMCNC: 32.6 G/DL (ref 31.4–37.4)
MCV RBC AUTO: 93 FL (ref 82–98)
NONHDLC SERPL-MCNC: 139 MG/DL
PLATELET # BLD AUTO: 234 THOUSANDS/UL (ref 149–390)
PMV BLD AUTO: 9.7 FL (ref 8.9–12.7)
POTASSIUM SERPL-SCNC: 4 MMOL/L (ref 3.5–5.3)
PROT SERPL-MCNC: 7.6 G/DL (ref 6.4–8.4)
RBC # BLD AUTO: 4.71 MILLION/UL (ref 3.81–5.12)
SODIUM SERPL-SCNC: 138 MMOL/L (ref 135–147)
TRIGL SERPL-MCNC: 217 MG/DL (ref ?–150)
TSH SERPL DL<=0.05 MIU/L-ACNC: 2.1 UIU/ML (ref 0.45–4.5)
WBC # BLD AUTO: 4.37 THOUSAND/UL (ref 4.31–10.16)

## 2025-06-25 PROCEDURE — 84443 ASSAY THYROID STIM HORMONE: CPT

## 2025-06-25 PROCEDURE — 86618 LYME DISEASE ANTIBODY: CPT

## 2025-06-25 PROCEDURE — 80053 COMPREHEN METABOLIC PANEL: CPT

## 2025-06-25 PROCEDURE — 80061 LIPID PANEL: CPT

## 2025-06-25 PROCEDURE — 85027 COMPLETE CBC AUTOMATED: CPT

## 2025-06-25 PROCEDURE — 36415 COLL VENOUS BLD VENIPUNCTURE: CPT

## 2025-06-25 PROCEDURE — 83036 HEMOGLOBIN GLYCOSYLATED A1C: CPT

## 2025-06-26 ENCOUNTER — TELEPHONE (OUTPATIENT)
Age: 70
End: 2025-06-26

## 2025-06-27 DIAGNOSIS — Z12.31 BREAST CANCER SCREENING BY MAMMOGRAM: Primary | ICD-10-CM

## 2025-07-27 ENCOUNTER — HOSPITAL ENCOUNTER (OUTPATIENT)
Dept: MAMMOGRAPHY | Facility: HOSPITAL | Age: 70
Discharge: HOME | End: 2025-07-27
Payer: MEDICARE

## 2025-07-27 VITALS — WEIGHT: 195 LBS | BODY MASS INDEX: 36.82 KG/M2 | HEIGHT: 61 IN

## 2025-07-27 DIAGNOSIS — Z12.31 BREAST CANCER SCREENING BY MAMMOGRAM: ICD-10-CM

## 2025-07-27 PROCEDURE — 77067 SCR MAMMO BI INCL CAD: CPT

## 2025-07-27 PROCEDURE — 77063 BREAST TOMOSYNTHESIS BI: CPT

## 2025-08-19 DIAGNOSIS — E78.5 HYPERLIPIDEMIA, UNSPECIFIED HYPERLIPIDEMIA TYPE: ICD-10-CM

## 2025-08-21 RX ORDER — SIMVASTATIN 20 MG
20 TABLET ORAL DAILY
Qty: 90 TABLET | Refills: 1 | Status: SHIPPED | OUTPATIENT
Start: 2025-08-21